# Patient Record
Sex: FEMALE | Race: WHITE | Employment: OTHER | URBAN - METROPOLITAN AREA
[De-identification: names, ages, dates, MRNs, and addresses within clinical notes are randomized per-mention and may not be internally consistent; named-entity substitution may affect disease eponyms.]

---

## 2017-11-14 ENCOUNTER — OFFICE VISIT (OUTPATIENT)
Dept: INTERNAL MEDICINE | Age: 47
End: 2017-11-14
Payer: COMMERCIAL

## 2017-11-14 VITALS
DIASTOLIC BLOOD PRESSURE: 80 MMHG | HEIGHT: 64 IN | SYSTOLIC BLOOD PRESSURE: 130 MMHG | BODY MASS INDEX: 25.1 KG/M2 | HEART RATE: 99 BPM | WEIGHT: 147 LBS

## 2017-11-14 DIAGNOSIS — Z23 NEED FOR DIPHTHERIA-TETANUS-PERTUSSIS (TDAP) VACCINE: ICD-10-CM

## 2017-11-14 DIAGNOSIS — F10.20 ALCOHOLISM (HCC): ICD-10-CM

## 2017-11-14 DIAGNOSIS — E78.2 MIXED HYPERLIPIDEMIA: ICD-10-CM

## 2017-11-14 DIAGNOSIS — K21.9 GASTROESOPHAGEAL REFLUX DISEASE WITHOUT ESOPHAGITIS: ICD-10-CM

## 2017-11-14 DIAGNOSIS — Z23 NEED FOR PROPHYLACTIC VACCINATION AGAINST STREPTOCOCCUS PNEUMONIAE (PNEUMOCOCCUS): ICD-10-CM

## 2017-11-14 DIAGNOSIS — F33.0 MILD EPISODE OF RECURRENT MAJOR DEPRESSIVE DISORDER (HCC): ICD-10-CM

## 2017-11-14 DIAGNOSIS — E11.9 TYPE 2 DIABETES MELLITUS WITHOUT COMPLICATION, WITHOUT LONG-TERM CURRENT USE OF INSULIN (HCC): Primary | ICD-10-CM

## 2017-11-14 DIAGNOSIS — Z23 NEEDS FLU SHOT: ICD-10-CM

## 2017-11-14 DIAGNOSIS — R03.0 PRE-HYPERTENSION: ICD-10-CM

## 2017-11-14 DIAGNOSIS — B18.2 CHRONIC HEPATITIS C WITHOUT HEPATIC COMA (HCC): ICD-10-CM

## 2017-11-14 LAB — HBA1C MFR BLD: 6.2 %

## 2017-11-14 PROCEDURE — 1036F TOBACCO NON-USER: CPT | Performed by: INTERNAL MEDICINE

## 2017-11-14 PROCEDURE — G8484 FLU IMMUNIZE NO ADMIN: HCPCS | Performed by: INTERNAL MEDICINE

## 2017-11-14 PROCEDURE — 99214 OFFICE O/P EST MOD 30 MIN: CPT | Performed by: INTERNAL MEDICINE

## 2017-11-14 PROCEDURE — 3044F HG A1C LEVEL LT 7.0%: CPT | Performed by: INTERNAL MEDICINE

## 2017-11-14 PROCEDURE — G8427 DOCREV CUR MEDS BY ELIG CLIN: HCPCS | Performed by: INTERNAL MEDICINE

## 2017-11-14 PROCEDURE — G8419 CALC BMI OUT NRM PARAM NOF/U: HCPCS | Performed by: INTERNAL MEDICINE

## 2017-11-14 PROCEDURE — 90715 TDAP VACCINE 7 YRS/> IM: CPT | Performed by: INTERNAL MEDICINE

## 2017-11-14 PROCEDURE — 90732 PPSV23 VACC 2 YRS+ SUBQ/IM: CPT | Performed by: INTERNAL MEDICINE

## 2017-11-14 PROCEDURE — 90471 IMMUNIZATION ADMIN: CPT | Performed by: INTERNAL MEDICINE

## 2017-11-14 PROCEDURE — 90472 IMMUNIZATION ADMIN EACH ADD: CPT | Performed by: INTERNAL MEDICINE

## 2017-11-14 PROCEDURE — 83036 HEMOGLOBIN GLYCOSYLATED A1C: CPT | Performed by: INTERNAL MEDICINE

## 2017-11-14 PROCEDURE — 90688 IIV4 VACCINE SPLT 0.5 ML IM: CPT | Performed by: INTERNAL MEDICINE

## 2017-11-14 RX ORDER — OMEPRAZOLE 20 MG/1
20 CAPSULE, DELAYED RELEASE ORAL DAILY
COMMUNITY
End: 2018-04-30 | Stop reason: SDUPTHER

## 2017-11-14 RX ORDER — DOCUSATE SODIUM 100 MG/1
100 CAPSULE, LIQUID FILLED ORAL 2 TIMES DAILY
COMMUNITY
End: 2022-06-07

## 2017-11-14 ASSESSMENT — PATIENT HEALTH QUESTIONNAIRE - PHQ9
3. TROUBLE FALLING OR STAYING ASLEEP: 3
9. THOUGHTS THAT YOU WOULD BE BETTER OFF DEAD, OR OF HURTING YOURSELF: 0
7. TROUBLE CONCENTRATING ON THINGS, SUCH AS READING THE NEWSPAPER OR WATCHING TELEVISION: 2
6. FEELING BAD ABOUT YOURSELF - OR THAT YOU ARE A FAILURE OR HAVE LET YOURSELF OR YOUR FAMILY DOWN: 0
SUM OF ALL RESPONSES TO PHQ9 QUESTIONS 1 & 2: 4
2. FEELING DOWN, DEPRESSED OR HOPELESS: 2
8. MOVING OR SPEAKING SO SLOWLY THAT OTHER PEOPLE COULD HAVE NOTICED. OR THE OPPOSITE, BEING SO FIGETY OR RESTLESS THAT YOU HAVE BEEN MOVING AROUND A LOT MORE THAN USUAL: 1
10. IF YOU CHECKED OFF ANY PROBLEMS, HOW DIFFICULT HAVE THESE PROBLEMS MADE IT FOR YOU TO DO YOUR WORK, TAKE CARE OF THINGS AT HOME, OR GET ALONG WITH OTHER PEOPLE: 1
5. POOR APPETITE OR OVEREATING: 1
1. LITTLE INTEREST OR PLEASURE IN DOING THINGS: 2
SUM OF ALL RESPONSES TO PHQ QUESTIONS 1-9: 12
4. FEELING TIRED OR HAVING LITTLE ENERGY: 1

## 2017-11-14 NOTE — PATIENT INSTRUCTIONS
LABORATORY INSTRUCTIONS    Your doctor has ordered blood or urine testing. You can get this testing done at the Lab located on the first floor of the [facility], or at any other [facility]. Please stop at Main Registration, before going to the lab, as you must be registered first.     Please get this lab done before your next visit. You may NOT eat, drink, smoke, or chew anything before this test for 8 hours. You may still have water. Return To Clinic 3/16/2018. After Visit Summary  given and reviewed. --MA    It is very important for your care that you keep your appointment. If for some reason you are unable to keep your appointment it is equally important that you call our office at [phone] to cancel your appointment and reschedule. Failure to do so may result in your termination from our practice. Additional lab orders were mailed to patient on 11/15/17 along with order for liver ultrasound.   ML on answering machine for patient to notify her and to expect a call from scheduling Paul CAMPOS

## 2017-11-14 NOTE — PROGRESS NOTES
[facility]    Progress Note    Date of patient's visit: 11/14/2017  YOB: 1970  Patient's Name:  Portillo Tsang    Patient Care Team:  Bhavin Dai MD as PCP - General (Internal Medicine)  Laurie Garcia MD as Consulting Physician (Gastroenterology)    REASON FOR VISIT: Routine outpatient follow     HISTORY OF PRESENT ILLNESS:    History was obtained from the patient. Portillo Tsang is a 49 y.o. is here for a  Follow up for chronic hep c. She is also complaining of depression that's worsening. She has bipolar disorder and is on Seroquel. Blood pressure initial elevated 142/90 but repeat was normal 130/80. Patient smoke before visit. Chronic hep c did not follow up with GI clinic. Actively drinking. Recent HCV RNA undetected but will re check now. 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)    Toothache       ALLERGIES      Allergies   Allergen Reactions    Benadryl [Diphenhydramine] Hives and Itching    Buspar [Buspirone]     Codeine     Flexeril [Cyclobenzaprine] Itching    Ibuprofen Hives    Lamotrigine     Paxil [Paroxetine Hcl]     Pcn [Penicillins]     Simvastatin     Zoloft [Sertraline Hcl]          MEDICATIONS:      Current Outpatient Prescriptions on File Prior to Visit   Medication Sig Dispense Refill    SEROQUEL  MG TB24 Take 150 mg by mouth daily       hydrOXYzine (VISTARIL) 25 MG capsule Take 25 mg by mouth nightly as needed for Anxiety      metroNIDAZOLE (FLAGYL) 500 MG tablet Take 1 tablet by mouth 2 times daily Take with Food. Do NOT drink alcohol.  14 tablet 0    niMODipine (NIMOTOP) 30 MG capsule Take 2 capsules by mouth every 6 hours for 7 days 56 capsule 0     No current facility-administered medications on file prior to visit. SOCIAL HISTORY    Reviewed and no change from previous record. Sonja  reports that she quit smoking about 2 years ago. Her smoking use included Cigarettes. She smoked 0.50 packs per day. She has never used smokeless tobacco.    FAMILY HISTORY:    Reviewed and No change from previous visit    REVIEW OF SYSTEMS:    ENT: negative  Respiratory: no cough, shortness of breath, or wheezing  Cardiovascular: no chest pain or dyspnea on exertion  Gastrointestinal: no abdominal pain, black or bloody stools  Neurological: no TIA or stroke symptoms  Endocrine: negative  Genito-Urinary: no dysuria, trouble voiding, or hematuria  Musculoskeletal: negative  Dermatological: negative    PHYSICAL EXAM:      Vitals:    11/14/17 1538   BP: (!) 142/97   Pulse: 99     Physical Exam   Constitutional: She appears well-nourished. HENT:   Head: Atraumatic. Eyes: Conjunctivae are normal.   Neck: Neck supple. Cardiovascular: Normal rate. Pulmonary/Chest: Effort normal.   Abdominal: Soft. Neurological: She is alert. Skin: Skin is warm. Psychiatric: She has a normal mood and affect.          LABORATORY FINDINGS:    CBC:  Lab Results   Component Value Date    WBC 8.4 11/01/2016    HGB 14.5 11/01/2016     11/01/2016     01/18/2012     BMP:    Lab Results   Component Value Date     11/01/2016    K 3.9 11/01/2016    CL 99 11/01/2016    CO2 23 11/01/2016    BUN 7 11/01/2016    CREATININE 0.48 11/01/2016    GLUCOSE 130 11/01/2016    GLUCOSE 123 01/18/2012     HEMOGLOBIN A1C:   Lab Results   Component Value Date    LABA1C 6.5 11/01/2016     MICROALBUMIN URINE: No results found for: MICROALBUR  FASTING LIPID PANEL:  Lab Results   Component Value Date    CHOL 315 (H) 11/01/2016    HDL 43 11/01/2016    TRIG 287 (H) 11/01/2016     LIVER PROFILE:  Lab Results   Component Value Date    ALT 28 11/01/2016    AST 18 11/01/2016    PROT 6.8 11/01/2016    BILITOT 0.46 11/01/2016    BILIDIR 0.10 11/01/2016    LABALBU 4.0 11/01/2016    LABALBU 4.5 01/18/2012      THYROID FUNCTION:   Lab Results   Component Value Date    TSH 0.90 11/01/2016      URINE ANALYSIS: No results found for: LABURIN  ASSESSMENT AND PLAN:    1. Chronic hepatitis C without hepatic coma (HCC)    - HIV 1/2 AB Multispot; Future  - CBC With Auto Differential; Future  - Basic Metabolic Panel; Future  - Hepatitis C RNA, Quantitative, PCR; Future  - Hepatitis C Antibody; Future  - Hepatitis C Genotyping; Future  - APTT; Future  - Protime-INR; Future  - Hepatic Function Panel; Future  - TSH With Reflex Ft4; Future  - AFP Tumor Marker; Future  - Hepatitis A Antibody, Total; Future  - US Liver; Future  - Hepatitis B Core Antibody, IgM; Future  - Hepatitis B Surface Antigen; Future    2. Mild episode of recurrent major depressive disorder ([facility] [address]  -pt has appointment with psych at [address] [place] [address] Ln  - Psychology referral    3. Pre hypertension  Discussed low salt and exrecise  Will recheck A1C    FOLLOW UP:   6 weeks    1. Sonja received counseling on the following healthy behaviors: nutrition, exercise, medication adherence and decrease in alcohol consumption  2. Patient given educational materials - see patient instructions  3. Discussed use, benefit, and side effects of prescribed medications. Barriers to medication compliance addressed. All patient questions answered. Pt voiced understanding. 4.  Reviewed prior labs and health maintenance  5. Continue current medications, diet and exercise.          Jorge Alberto Witt MD, PGY-2 Internal Medicine Resident  [facility]  11/14/2017  4:17 PM

## 2017-11-22 ENCOUNTER — HOSPITAL ENCOUNTER (OUTPATIENT)
Dept: ULTRASOUND IMAGING | Age: 47
Discharge: HOME | End: 2017-11-22
Payer: COMMERCIAL

## 2017-11-22 DIAGNOSIS — B18.2 CHRONIC HEPATITIS C WITHOUT HEPATIC COMA (HCC): ICD-10-CM

## 2017-11-22 PROCEDURE — 76705 ECHO EXAM OF ABDOMEN: CPT

## 2018-02-16 ENCOUNTER — HOSPITAL ENCOUNTER (OUTPATIENT)
Age: 48
Discharge: HOME | End: 2018-02-16
Payer: COMMERCIAL

## 2018-02-16 DIAGNOSIS — B18.2 CHRONIC HEPATITIS C WITHOUT HEPATIC COMA (HCC): ICD-10-CM

## 2018-02-16 DIAGNOSIS — E78.2 MIXED HYPERLIPIDEMIA: ICD-10-CM

## 2018-02-16 LAB
ABSOLUTE EOS #: 0.39 K/UL (ref 0–0.44)
ABSOLUTE IMMATURE GRANULOCYTE: <0.03 K/UL (ref 0–0.3)
ABSOLUTE LYMPH #: 4.34 K/UL (ref 1.1–3.7)
ABSOLUTE MONO #: 0.48 K/UL (ref 0.1–1.2)
AFP: 2.6 UG/L
ALBUMIN SERPL-MCNC: 4.3 G/DL (ref 3.5–5.2)
ALBUMIN/GLOBULIN RATIO: 1.3 (ref 1–2.5)
ALP BLD-CCNC: 48 U/L (ref 35–104)
ALT SERPL-CCNC: 19 U/L (ref 5–33)
ANION GAP SERPL CALCULATED.3IONS-SCNC: 13 MMOL/L (ref 9–17)
AST SERPL-CCNC: 15 U/L
BASOPHILS # BLD: 1 % (ref 0–2)
BASOPHILS ABSOLUTE: 0.06 K/UL (ref 0–0.2)
BILIRUB SERPL-MCNC: 0.46 MG/DL (ref 0.3–1.2)
BILIRUBIN DIRECT: 0.1 MG/DL
BILIRUBIN, INDIRECT: 0.36 MG/DL (ref 0–1)
BUN BLDV-MCNC: 10 MG/DL (ref 6–20)
BUN/CREAT BLD: ABNORMAL (ref 9–20)
CALCIUM SERPL-MCNC: 9.8 MG/DL (ref 8.6–10.4)
CHLORIDE BLD-SCNC: 102 MMOL/L (ref 98–107)
CHOLESTEROL/HDL RATIO: 6.6
CHOLESTEROL: 338 MG/DL
CO2: 26 MMOL/L (ref 20–31)
CREAT SERPL-MCNC: 0.48 MG/DL (ref 0.5–0.9)
DIFFERENTIAL TYPE: ABNORMAL
EOSINOPHILS RELATIVE PERCENT: 5 % (ref 1–4)
GFR AFRICAN AMERICAN: >60 ML/MIN
GFR NON-AFRICAN AMERICAN: >60 ML/MIN
GFR SERPL CREATININE-BSD FRML MDRD: ABNORMAL ML/MIN/{1.73_M2}
GFR SERPL CREATININE-BSD FRML MDRD: ABNORMAL ML/MIN/{1.73_M2}
GLOBULIN: NORMAL G/DL (ref 1.5–3.8)
GLUCOSE BLD-MCNC: 118 MG/DL (ref 70–99)
HAV AB SERPL IA-ACNC: NONREACTIVE
HCT VFR BLD CALC: 46.3 % (ref 36.3–47.1)
HDLC SERPL-MCNC: 51 MG/DL
HEMOGLOBIN: 15 G/DL (ref 11.9–15.1)
HEPATITIS B SURFACE ANTIGEN: NONREACTIVE
HIV AG/AB: NONREACTIVE
IMMATURE GRANULOCYTES: 0 %
INR BLD: 0.9
LDL CHOLESTEROL: 243 MG/DL (ref 0–130)
LYMPHOCYTES # BLD: 52 % (ref 24–43)
MCH RBC QN AUTO: 30.7 PG (ref 25.2–33.5)
MCHC RBC AUTO-ENTMCNC: 32.4 G/DL (ref 28.4–34.8)
MCV RBC AUTO: 94.7 FL (ref 82.6–102.9)
MONOCYTES # BLD: 6 % (ref 3–12)
NRBC AUTOMATED: 0 PER 100 WBC
PARTIAL THROMBOPLASTIN TIME: 27.2 SEC (ref 20.5–30.5)
PDW BLD-RTO: 12.4 % (ref 11.8–14.4)
PLATELET # BLD: ABNORMAL K/UL (ref 138–453)
PLATELET ESTIMATE: ABNORMAL
PLATELET, FLUORESCENCE: 238 K/UL (ref 138–453)
PLATELET, IMMATURE FRACTION: 3 % (ref 1.1–10.3)
PMV BLD AUTO: ABNORMAL FL (ref 8.1–13.5)
POTASSIUM SERPL-SCNC: 4 MMOL/L (ref 3.7–5.3)
PROTHROMBIN TIME: 10.2 SEC (ref 9–12)
RBC # BLD: 4.89 M/UL (ref 3.95–5.11)
RBC # BLD: ABNORMAL 10*6/UL
SEG NEUTROPHILS: 36 % (ref 36–65)
SEGMENTED NEUTROPHILS ABSOLUTE COUNT: 3.03 K/UL (ref 1.5–8.1)
SODIUM BLD-SCNC: 141 MMOL/L (ref 135–144)
TOTAL PROTEIN: 7.5 G/DL (ref 6.4–8.3)
TRIGL SERPL-MCNC: 220 MG/DL
TSH SERPL DL<=0.05 MIU/L-ACNC: 3.69 MIU/L (ref 0.3–5)
VLDLC SERPL CALC-MCNC: ABNORMAL MG/DL (ref 1–30)
WBC # BLD: 8.3 K/UL (ref 3.5–11.3)
WBC # BLD: ABNORMAL 10*3/UL

## 2018-02-16 PROCEDURE — 36415 COLL VENOUS BLD VENIPUNCTURE: CPT

## 2018-02-16 PROCEDURE — 87522 HEPATITIS C REVRS TRNSCRPJ: CPT

## 2018-02-16 PROCEDURE — 85730 THROMBOPLASTIN TIME PARTIAL: CPT

## 2018-02-16 PROCEDURE — 80076 HEPATIC FUNCTION PANEL: CPT

## 2018-02-16 PROCEDURE — 87902 NFCT AGT GNTYP ALYS HEP C: CPT

## 2018-02-16 PROCEDURE — 85055 RETICULATED PLATELET ASSAY: CPT

## 2018-02-16 PROCEDURE — 85025 COMPLETE CBC W/AUTO DIFF WBC: CPT

## 2018-02-16 PROCEDURE — 80061 LIPID PANEL: CPT

## 2018-02-16 PROCEDURE — 85610 PROTHROMBIN TIME: CPT

## 2018-02-16 PROCEDURE — 84443 ASSAY THYROID STIM HORMONE: CPT

## 2018-02-16 PROCEDURE — 82105 ALPHA-FETOPROTEIN SERUM: CPT

## 2018-02-16 PROCEDURE — 87340 HEPATITIS B SURFACE AG IA: CPT

## 2018-02-16 PROCEDURE — 80048 BASIC METABOLIC PNL TOTAL CA: CPT

## 2018-02-16 PROCEDURE — 87389 HIV-1 AG W/HIV-1&-2 AB AG IA: CPT

## 2018-02-16 PROCEDURE — 86708 HEPATITIS A ANTIBODY: CPT

## 2018-02-20 LAB
DIRECT EXAM: NORMAL
HCV QUANTITATIVE: NORMAL
HEPATITIS C GENOTYPE: NORMAL
Lab: NORMAL
SPECIMEN DESCRIPTION: NORMAL
STATUS: NORMAL

## 2018-03-19 ENCOUNTER — OFFICE VISIT (OUTPATIENT)
Dept: INTERNAL MEDICINE | Age: 48
End: 2018-03-19
Payer: COMMERCIAL

## 2018-03-19 VITALS
HEART RATE: 78 BPM | SYSTOLIC BLOOD PRESSURE: 142 MMHG | BODY MASS INDEX: 25.27 KG/M2 | HEIGHT: 64 IN | DIASTOLIC BLOOD PRESSURE: 76 MMHG | WEIGHT: 148 LBS

## 2018-03-19 DIAGNOSIS — I10 ESSENTIAL HYPERTENSION: Primary | ICD-10-CM

## 2018-03-19 DIAGNOSIS — E78.00 PURE HYPERCHOLESTEROLEMIA: ICD-10-CM

## 2018-03-19 DIAGNOSIS — R73.03 PRE-DIABETES: ICD-10-CM

## 2018-03-19 PROCEDURE — 99213 OFFICE O/P EST LOW 20 MIN: CPT | Performed by: STUDENT IN AN ORGANIZED HEALTH CARE EDUCATION/TRAINING PROGRAM

## 2018-03-19 PROCEDURE — 1036F TOBACCO NON-USER: CPT | Performed by: STUDENT IN AN ORGANIZED HEALTH CARE EDUCATION/TRAINING PROGRAM

## 2018-03-19 PROCEDURE — G8419 CALC BMI OUT NRM PARAM NOF/U: HCPCS | Performed by: STUDENT IN AN ORGANIZED HEALTH CARE EDUCATION/TRAINING PROGRAM

## 2018-03-19 PROCEDURE — G8427 DOCREV CUR MEDS BY ELIG CLIN: HCPCS | Performed by: STUDENT IN AN ORGANIZED HEALTH CARE EDUCATION/TRAINING PROGRAM

## 2018-03-19 PROCEDURE — 99213 OFFICE O/P EST LOW 20 MIN: CPT

## 2018-03-19 PROCEDURE — G8482 FLU IMMUNIZE ORDER/ADMIN: HCPCS | Performed by: STUDENT IN AN ORGANIZED HEALTH CARE EDUCATION/TRAINING PROGRAM

## 2018-03-19 RX ORDER — ATORVASTATIN CALCIUM 40 MG/1
40 TABLET, FILM COATED ORAL DAILY
Qty: 30 TABLET | Refills: 3 | Status: SHIPPED | OUTPATIENT
Start: 2018-03-19 | End: 2018-04-30 | Stop reason: SDUPTHER

## 2018-03-19 RX ORDER — DULOXETIN HYDROCHLORIDE 60 MG/1
60 CAPSULE, DELAYED RELEASE ORAL 2 TIMES DAILY
COMMUNITY
Start: 2018-01-18

## 2018-03-19 RX ORDER — QUETIAPINE 200 MG/1
200 TABLET, FILM COATED, EXTENDED RELEASE ORAL DAILY
COMMUNITY
Start: 2018-01-18

## 2018-03-19 RX ORDER — LISINOPRIL 5 MG/1
5 TABLET ORAL DAILY
Qty: 30 TABLET | Refills: 3 | Status: SHIPPED | OUTPATIENT
Start: 2018-03-19 | End: 2018-04-30 | Stop reason: SDUPTHER

## 2018-03-19 NOTE — PROGRESS NOTES
cranial nerves, motor system noted    LABORATORY FINDINGS:    CBC:  Lab Results   Component Value Date    WBC 8.3 02/16/2018    HGB 15.0 02/16/2018    PLT See Reflexed IPF Result 02/16/2018     01/18/2012       BMP:    Lab Results   Component Value Date     02/16/2018    K 4.0 02/16/2018     02/16/2018    CO2 26 02/16/2018    BUN 10 02/16/2018    CREATININE 0.48 02/16/2018    GLUCOSE 118 02/16/2018    GLUCOSE 123 01/18/2012       HEMOGLOBIN A1C:   Lab Results   Component Value Date    LABA1C 6.2 11/14/2017       FASTING LIPID PANEL:  Lab Results   Component Value Date    CHOL 338 (H) 02/16/2018    HDL 51 02/16/2018    TRIG 220 (H) 02/16/2018       ASSESSMENT AND PLAN:      1. Essential hypertension  Basic Metabolic Panel    lisinopril (PRINIVIL;ZESTRIL) 5 MG tablet   2. Pre-diabetes     3. Pure hypercholesterolemia  atorvastatin (LIPITOR) 40 MG tablet       FOLLOW UP:   1. F/u in 4 weeks    INSTRUCTIONS:   1. Sonja received counseling on the following healthy behaviors: nutrition, exercise, medication adherence and tobacco cessation    2. Discussed use, benefit, and side effects of prescribed medications. Barriers to medication compliance addressed. All patient questions answered. Pt voiced understanding.      3. Patient given educational materials - see patient instructions    SIXTO Sarmiento  3/19/2018, 3:40 PM

## 2018-04-30 ENCOUNTER — OFFICE VISIT (OUTPATIENT)
Dept: INTERNAL MEDICINE | Age: 48
End: 2018-04-30
Payer: COMMERCIAL

## 2018-04-30 VITALS
DIASTOLIC BLOOD PRESSURE: 81 MMHG | SYSTOLIC BLOOD PRESSURE: 136 MMHG | WEIGHT: 140 LBS | HEART RATE: 80 BPM | HEIGHT: 64 IN | BODY MASS INDEX: 23.9 KG/M2

## 2018-04-30 DIAGNOSIS — E78.00 PURE HYPERCHOLESTEROLEMIA: ICD-10-CM

## 2018-04-30 DIAGNOSIS — E11.9 TYPE 2 DIABETES MELLITUS WITHOUT COMPLICATION, WITHOUT LONG-TERM CURRENT USE OF INSULIN (HCC): Primary | ICD-10-CM

## 2018-04-30 DIAGNOSIS — I10 ESSENTIAL HYPERTENSION: ICD-10-CM

## 2018-04-30 DIAGNOSIS — E55.9 VITAMIN D DEFICIENCY: ICD-10-CM

## 2018-04-30 DIAGNOSIS — K21.9 GASTROESOPHAGEAL REFLUX DISEASE WITHOUT ESOPHAGITIS: ICD-10-CM

## 2018-04-30 PROCEDURE — 3046F HEMOGLOBIN A1C LEVEL >9.0%: CPT | Performed by: STUDENT IN AN ORGANIZED HEALTH CARE EDUCATION/TRAINING PROGRAM

## 2018-04-30 PROCEDURE — 99213 OFFICE O/P EST LOW 20 MIN: CPT | Performed by: STUDENT IN AN ORGANIZED HEALTH CARE EDUCATION/TRAINING PROGRAM

## 2018-04-30 PROCEDURE — 4004F PT TOBACCO SCREEN RCVD TLK: CPT | Performed by: STUDENT IN AN ORGANIZED HEALTH CARE EDUCATION/TRAINING PROGRAM

## 2018-04-30 PROCEDURE — G8420 CALC BMI NORM PARAMETERS: HCPCS | Performed by: STUDENT IN AN ORGANIZED HEALTH CARE EDUCATION/TRAINING PROGRAM

## 2018-04-30 PROCEDURE — G8427 DOCREV CUR MEDS BY ELIG CLIN: HCPCS | Performed by: STUDENT IN AN ORGANIZED HEALTH CARE EDUCATION/TRAINING PROGRAM

## 2018-04-30 PROCEDURE — 99213 OFFICE O/P EST LOW 20 MIN: CPT

## 2018-04-30 PROCEDURE — 2022F DILAT RTA XM EVC RTNOPTHY: CPT | Performed by: STUDENT IN AN ORGANIZED HEALTH CARE EDUCATION/TRAINING PROGRAM

## 2018-04-30 RX ORDER — OMEPRAZOLE 20 MG/1
20 CAPSULE, DELAYED RELEASE ORAL DAILY
Qty: 30 CAPSULE | Refills: 5 | Status: SHIPPED | OUTPATIENT
Start: 2018-04-30 | End: 2020-01-27 | Stop reason: SDUPTHER

## 2018-04-30 RX ORDER — ATORVASTATIN CALCIUM 40 MG/1
40 TABLET, FILM COATED ORAL DAILY
Qty: 30 TABLET | Refills: 5 | Status: SHIPPED | OUTPATIENT
Start: 2018-04-30 | End: 2020-01-27 | Stop reason: SDUPTHER

## 2018-04-30 RX ORDER — LISINOPRIL 5 MG/1
5 TABLET ORAL DAILY
Qty: 30 TABLET | Refills: 5 | Status: SHIPPED | OUTPATIENT
Start: 2018-04-30 | End: 2019-03-04 | Stop reason: SDUPTHER

## 2018-06-01 ENCOUNTER — HOSPITAL ENCOUNTER (OUTPATIENT)
Age: 48
Setting detail: SPECIMEN
Discharge: HOME | End: 2018-06-01
Payer: COMMERCIAL

## 2018-06-01 ENCOUNTER — OFFICE VISIT (OUTPATIENT)
Dept: INTERNAL MEDICINE | Age: 48
End: 2018-06-01
Payer: COMMERCIAL

## 2018-06-01 VITALS
SYSTOLIC BLOOD PRESSURE: 134 MMHG | HEART RATE: 88 BPM | DIASTOLIC BLOOD PRESSURE: 81 MMHG | BODY MASS INDEX: 23.65 KG/M2 | WEIGHT: 137.8 LBS

## 2018-06-01 DIAGNOSIS — F17.200 SMOKER: Primary | ICD-10-CM

## 2018-06-01 DIAGNOSIS — E55.9 VITAMIN D DEFICIENCY: ICD-10-CM

## 2018-06-01 DIAGNOSIS — R73.03 PREDIABETES: ICD-10-CM

## 2018-06-01 DIAGNOSIS — E78.5 HYPERLIPIDEMIA, UNSPECIFIED HYPERLIPIDEMIA TYPE: ICD-10-CM

## 2018-06-01 DIAGNOSIS — I10 ESSENTIAL HYPERTENSION: ICD-10-CM

## 2018-06-01 DIAGNOSIS — E78.00 PURE HYPERCHOLESTEROLEMIA: ICD-10-CM

## 2018-06-01 DIAGNOSIS — Z12.39 BREAST CANCER SCREENING: ICD-10-CM

## 2018-06-01 LAB
ANION GAP SERPL CALCULATED.3IONS-SCNC: 13 MMOL/L (ref 9–17)
BUN BLDV-MCNC: 8 MG/DL (ref 6–20)
BUN/CREAT BLD: ABNORMAL (ref 9–20)
CALCIUM SERPL-MCNC: 9.5 MG/DL (ref 8.6–10.4)
CHLORIDE BLD-SCNC: 98 MMOL/L (ref 98–107)
CHOLESTEROL/HDL RATIO: 4.4
CHOLESTEROL: 247 MG/DL
CO2: 26 MMOL/L (ref 20–31)
CREAT SERPL-MCNC: 0.42 MG/DL (ref 0.5–0.9)
CREATININE URINE: 127.2 MG/DL (ref 28–217)
GFR AFRICAN AMERICAN: >60 ML/MIN
GFR NON-AFRICAN AMERICAN: >60 ML/MIN
GFR SERPL CREATININE-BSD FRML MDRD: ABNORMAL ML/MIN/{1.73_M2}
GFR SERPL CREATININE-BSD FRML MDRD: ABNORMAL ML/MIN/{1.73_M2}
GLUCOSE BLD-MCNC: 112 MG/DL (ref 70–99)
HBA1C MFR BLD: 6.2 %
HDLC SERPL-MCNC: 56 MG/DL
LDL CHOLESTEROL: 141 MG/DL (ref 0–130)
MICROALBUMIN/CREAT 24H UR: <12 MG/L
MICROALBUMIN/CREAT UR-RTO: NORMAL MCG/MG CREAT
POTASSIUM SERPL-SCNC: 4.5 MMOL/L (ref 3.7–5.3)
SODIUM BLD-SCNC: 137 MMOL/L (ref 135–144)
TRIGL SERPL-MCNC: 252 MG/DL
VITAMIN D 25-HYDROXY: 17.7 NG/ML (ref 30–100)
VLDLC SERPL CALC-MCNC: ABNORMAL MG/DL (ref 1–30)

## 2018-06-01 PROCEDURE — 83036 HEMOGLOBIN GLYCOSYLATED A1C: CPT | Performed by: STUDENT IN AN ORGANIZED HEALTH CARE EDUCATION/TRAINING PROGRAM

## 2018-06-01 PROCEDURE — 82306 VITAMIN D 25 HYDROXY: CPT

## 2018-06-01 PROCEDURE — 80061 LIPID PANEL: CPT

## 2018-06-01 PROCEDURE — 99213 OFFICE O/P EST LOW 20 MIN: CPT | Performed by: INTERNAL MEDICINE

## 2018-06-01 PROCEDURE — 99213 OFFICE O/P EST LOW 20 MIN: CPT | Performed by: STUDENT IN AN ORGANIZED HEALTH CARE EDUCATION/TRAINING PROGRAM

## 2018-06-01 PROCEDURE — 80048 BASIC METABOLIC PNL TOTAL CA: CPT

## 2018-06-01 PROCEDURE — 82570 ASSAY OF URINE CREATININE: CPT

## 2018-06-01 PROCEDURE — 36415 COLL VENOUS BLD VENIPUNCTURE: CPT

## 2018-06-01 PROCEDURE — 82043 UR ALBUMIN QUANTITATIVE: CPT

## 2018-06-01 PROCEDURE — 4004F PT TOBACCO SCREEN RCVD TLK: CPT | Performed by: STUDENT IN AN ORGANIZED HEALTH CARE EDUCATION/TRAINING PROGRAM

## 2018-06-01 PROCEDURE — G8427 DOCREV CUR MEDS BY ELIG CLIN: HCPCS | Performed by: STUDENT IN AN ORGANIZED HEALTH CARE EDUCATION/TRAINING PROGRAM

## 2018-06-01 PROCEDURE — G8420 CALC BMI NORM PARAMETERS: HCPCS | Performed by: STUDENT IN AN ORGANIZED HEALTH CARE EDUCATION/TRAINING PROGRAM

## 2018-06-01 RX ORDER — ALBUTEROL SULFATE 90 UG/1
2 AEROSOL, METERED RESPIRATORY (INHALATION) EVERY 6 HOURS PRN
Qty: 1 INHALER | Refills: 3 | Status: SHIPPED | OUTPATIENT
Start: 2018-06-01 | End: 2018-08-14 | Stop reason: SDUPTHER

## 2018-07-03 DIAGNOSIS — E55.9 VITAMIN D DEFICIENCY: Primary | ICD-10-CM

## 2018-07-03 RX ORDER — ERGOCALCIFEROL 1.25 MG/1
50000 CAPSULE ORAL WEEKLY
Qty: 8 CAPSULE | Refills: 0 | Status: SHIPPED | OUTPATIENT
Start: 2018-07-03 | End: 2018-10-29 | Stop reason: SDUPTHER

## 2018-07-10 ENCOUNTER — HOSPITAL ENCOUNTER (EMERGENCY)
Age: 48
Discharge: HOME | End: 2018-07-10
Attending: EMERGENCY MEDICINE
Payer: COMMERCIAL

## 2018-07-10 ENCOUNTER — APPOINTMENT (OUTPATIENT)
Dept: GENERAL RADIOLOGY | Age: 48
End: 2018-07-10
Payer: COMMERCIAL

## 2018-07-10 VITALS
WEIGHT: 145 LBS | HEART RATE: 87 BPM | RESPIRATION RATE: 15 BRPM | OXYGEN SATURATION: 99 % | BODY MASS INDEX: 24.89 KG/M2 | TEMPERATURE: 97.7 F | DIASTOLIC BLOOD PRESSURE: 62 MMHG | SYSTOLIC BLOOD PRESSURE: 112 MMHG

## 2018-07-10 DIAGNOSIS — S32.2XXA FRACTURE OF COCCYX, INITIAL ENCOUNTER FOR CLOSED FRACTURE (HCC): Primary | ICD-10-CM

## 2018-07-10 PROCEDURE — 6370000000 HC RX 637 (ALT 250 FOR IP): Performed by: PHYSICIAN ASSISTANT

## 2018-07-10 PROCEDURE — 99283 EMERGENCY DEPT VISIT LOW MDM: CPT

## 2018-07-10 PROCEDURE — 72100 X-RAY EXAM L-S SPINE 2/3 VWS: CPT

## 2018-07-10 PROCEDURE — 72170 X-RAY EXAM OF PELVIS: CPT

## 2018-07-10 PROCEDURE — 72220 X-RAY EXAM SACRUM TAILBONE: CPT

## 2018-07-10 RX ORDER — HYDROCODONE BITARTRATE AND ACETAMINOPHEN 5; 325 MG/1; MG/1
1 TABLET ORAL ONCE
Status: COMPLETED | OUTPATIENT
Start: 2018-07-10 | End: 2018-07-10

## 2018-07-10 RX ORDER — OXYCODONE HYDROCHLORIDE AND ACETAMINOPHEN 5; 325 MG/1; MG/1
1 TABLET ORAL EVERY 6 HOURS PRN
Qty: 12 TABLET | Refills: 0 | Status: SHIPPED | OUTPATIENT
Start: 2018-07-10 | End: 2018-07-13

## 2018-07-10 RX ADMIN — HYDROCODONE BITARTRATE AND ACETAMINOPHEN 1 TABLET: 5; 325 TABLET ORAL at 19:55

## 2018-07-10 ASSESSMENT — ENCOUNTER SYMPTOMS
BACK PAIN: 1
NAUSEA: 0
VOMITING: 0
SHORTNESS OF BREATH: 0
ABDOMINAL PAIN: 0
DIARRHEA: 0
COLOR CHANGE: 0
COUGH: 0

## 2018-07-10 ASSESSMENT — PAIN SCALES - GENERAL
PAINLEVEL_OUTOF10: 10
PAINLEVEL_OUTOF10: 10

## 2018-07-10 ASSESSMENT — PAIN DESCRIPTION - LOCATION: LOCATION: BACK

## 2018-07-10 ASSESSMENT — PAIN DESCRIPTION - PAIN TYPE: TYPE: ACUTE PAIN

## 2018-07-10 NOTE — ED PROVIDER NOTES
[facility]  ED  eMERGENCY dEPARTMENT eNCOUnter      Pt Name: Narendra Coates  MRN: [id number]  [place] 1970  Date of evaluation: 7/10/2018  Provider: Claudia Gabriel Dr       Chief Complaint   Patient presents with    Back Pain     tailbone area after falling severral days ago             HISTORY OF PRESENT ILLNESS  (Location/Symptom, Timing/Onset, Context/Setting, Quality, Duration, Modifying Factors, Severity.)   Narendra Coates is a 51 y.o. female who presents to the emergency department Complaining of sacrum and coccyx pain along with lumbar spine pain status post fall 3 days ago at her daughter's wedding. She states she was drunk on tequila and fell on her butt. She denies any radicular pain and denies any other symptoms at this time. No nausea or vomiting. She is ambulatory. She did not hit her head or lose consciousness. REVIEW OF SYSTEMS    (2-9 systems for level 4, 10 or more for level 5)     Review of Systems   Constitutional: Negative for chills, fatigue and fever. Respiratory: Negative for cough and shortness of breath. Cardiovascular: Negative for chest pain, palpitations and leg swelling. Gastrointestinal: Negative for abdominal pain, diarrhea, nausea and vomiting. Musculoskeletal: Positive for back pain. Negative for neck pain and neck stiffness. Tailbone pain. Skin: Negative for color change. Neurological: Negative for dizziness, facial asymmetry, weakness, light-headedness, numbness and headaches.          PAST MEDICAL HISTORY         Diagnosis Date    Anxiety     B[facility] disorder ([facility] [address]     Brain aneurysm 9/2015    Depression     Epilepsy [facility])     NOT ON ANY MEDICATION    Essential hypertension 6/1/2018    Headache     Hyperlipidemia     Liver disease     hep c    Manic depression (HCC)     Pancreatitis     Scoliosis     Seasonal allergies        SURGICAL HISTORY           Procedure Laterality Date    BRAIN  Mother Alive    Father     Sister (Not Specified)        SOCIAL HISTORY      reports that she has been smoking Cigarettes. She has been smoking about 0.50 packs per day. She has never used smokeless tobacco. She reports that she drinks about 1.8 oz of alcohol per week . She reports that she does not use drugs. PHYSICAL EXAM    (up to 7 for level 4, 8 or more for level 5)     Vitals:    07/10/18 194   BP: 112/62   Pulse: 87   Resp: 15   Temp: 97.7 °F (36.5 °C)   TempSrc: Oral   SpO2: 99%   Weight: 145 lb (65.8 kg)       Physical Exam   Constitutional: She is oriented to person, place, and time. She appears well-developed and well-nourished. No distress. HENT:   Head: Normocephalic and atraumatic. Eyes: Conjunctivae are normal. Pupils are equal, round, and reactive to light. Neck: Normal range of motion. Neck supple. Cardiovascular: Normal rate, regular rhythm, normal heart sounds and intact distal pulses. Exam reveals no gallop and no friction rub. No murmur heard. Pulmonary/Chest: Effort normal and breath sounds normal. No respiratory distress. She has no wheezes. She has no rales. Abdominal: Soft. Bowel sounds are normal. She exhibits no distension and no mass. There is no tenderness. There is no rebound and no guarding. Musculoskeletal: Normal range of motion. Back:    No saddle anesthesia. No bowel or bladder dysfunction. No IV drug use. Low suspicion for cauda equina syndrome or epidural abscess. Motor function is 5 out of 5 bilaterally in all extremities. Cap refills less than 2 seconds in all extremities. Light sensation is intact. Proprioception is within normal limits. Distal pulses and deep tendon reflexes are within normal limits. Negative straight leg examination. No calf pain. All compartments are soft and compressible. No septic joints noted. Neurological: She is alert and oriented to person, place, and time. She has normal reflexes. No cranial nerve deficit. and patient agree no further workup is necessary at this time. Patient was given very strict return protocols and is completely agreeable with this plan. This patient was seen by the attending physician and they agreed with the assessment and plan. CONSULTS:  None    PROCEDURES:  Procedures    FINAL IMPRESSION      1. Fracture of coccyx, initial encounter for closed fracture [facility])          DISPOSITION/PLAN   DISPOSITION Decision To Discharge 07/10/2018 09:09:13 PM      PATIENT REFERRED TO:  Rachel Allen MD  [address] [facility] [address] [facility] [address] [phone]    Go in 3 days  For [facility]  [address] [facility] [address] [phone]  Go to   As needed, If symptoms worsen    [address] [facility]  [address]  [phone]  Go in 3 days  For Re-check      DISCHARGE MEDICATIONS:  Discharge Medication List as of 7/10/2018  9:11 PM      START taking these medications    Details   oxyCODONE-acetaminophen (PERCOCET) 5-325 MG per tablet Take 1 tablet by mouth every 6 hours as needed for Pain for up to 3 days. Intended supply: 3 days.  Take lowest dose possible to manage pain., Disp-12 tablet, R-0Print             (Please note that portions of this note were completed with a voice recognition program.  Efforts were made to edit the dictations but occasionally words are mis-transcribed.)    THOMAS Alanis PA-C  07/10/18 [id number]

## 2018-07-18 DIAGNOSIS — E11.9 TYPE 2 DIABETES MELLITUS WITHOUT COMPLICATION, WITHOUT LONG-TERM CURRENT USE OF INSULIN (HCC): ICD-10-CM

## 2018-07-18 NOTE — TELEPHONE ENCOUNTER
E-scribe request for Metformin last appt 6/1/2018. Please review and e-scribe if applicable. Next Visit Date:  Future Appointments  Date Time Provider Jyoti Yee   7/31/2018 1:45 PM STV MAMMO RM 1 [facility] MAMMO [facility]   7/31/2018 3:00 PM STV PFT RM 1 [facility]VZ PFT [facility]   9/11/2018 11:00 AM Brenda Ulrich MD [id number]       Hemoglobin A1C (%)   Date Value   06/01/2018 6.2   11/14/2017 6.2   11/01/2016 6.5 (H)             ( goal A1C is < 7)   Microalb/Crt.  Ratio (mcg/mg creat)   Date Value   06/01/2018 CANNOT BE CALCULATED     LDL Cholesterol (mg/dL)   Date Value   06/01/2018 141 (H)     LDL Calculated (mg/dL)   Date Value   04/04/2014 253 (A)       (goal LDL is <100)   AST (U/L)   Date Value   02/16/2018 15     ALT (U/L)   Date Value   02/16/2018 19     BUN (mg/dL)   Date Value   06/01/2018 8     BP Readings from Last 3 Encounters:   07/10/18 112/62   06/01/18 134/81   04/30/18 136/81          (goal 120/80)        Patient Active Problem List:     Bipolar disorder ([facility] [address]     Scoliosis     Depression     Hyperlipemia     Essential hypertension     Prediabetes

## 2018-07-30 DIAGNOSIS — Z12.39 SCREENING FOR BREAST CANCER: Primary | ICD-10-CM

## 2018-08-14 DIAGNOSIS — F17.200 SMOKER: ICD-10-CM

## 2018-08-15 NOTE — TELEPHONE ENCOUNTER
Ventolin pending for refill         Health Maintenance   Topic Date Due    Diabetic retinal exam  03/18/1979    Cervical cancer screen  03/18/1990    Flu vaccine (1) 09/01/2018    Diabetic foot exam  06/01/2019    A1C test (Diabetic or Prediabetic)  06/01/2019    Diabetic microalbuminuria test  06/01/2019    Lipid screen  06/01/2019    Potassium monitoring  06/01/2019    Creatinine monitoring  06/01/2019    DTaP/Tdap/Td vaccine (2 - Td) 11/14/2027    Pneumococcal med risk  Completed    HIV screen  Completed             (applicable per patient's age: Cancer Screenings, Depression Screening, Fall Risk Screening, Immunizations)    Hemoglobin A1C (%)   Date Value   06/01/2018 6.2   11/14/2017 6.2   11/01/2016 6.5 (H)     Microalb/Crt.  Ratio (mcg/mg creat)   Date Value   06/01/2018 CANNOT BE CALCULATED     LDL Cholesterol (mg/dL)   Date Value   06/01/2018 141 (H)     LDL Calculated (mg/dL)   Date Value   04/04/2014 253 (A)     AST (U/L)   Date Value   02/16/2018 15     ALT (U/L)   Date Value   02/16/2018 19     BUN (mg/dL)   Date Value   06/01/2018 8      (goal A1C is < 7)   (goal LDL is <100) need 30-50% reduction from baseline     BP Readings from Last 3 Encounters:   07/10/18 112/62   06/01/18 134/81   04/30/18 136/81    (goal /80)      All Future Testing planned in [facility]:  Lab Frequency Next Occurrence   DEBBY DIGITAL SCREENING AUGMENTED BILAT Once 09/24/2018   Spirometry with bronchodilator Once 06/15/2018   DEBBY DIGITAL SCREEN W CAD BILATERAL Once 11/07/2018       Next Visit Date:  Future Appointments  Date Time Provider Jyoti Yee   9/11/2018 11:00 AM Robin Tanner MD [facility] IM [address]            Patient Active Problem List:     Bipolar disorder ([facility] [address]     Scoliosis     Depression     Hyperlipemia     Essential hypertension     Prediabetes

## 2018-08-21 ENCOUNTER — TELEPHONE (OUTPATIENT)
Dept: INTERNAL MEDICINE | Age: 48
End: 2018-08-21

## 2018-08-21 DIAGNOSIS — J45.909 UNCOMPLICATED ASTHMA, UNSPECIFIED ASTHMA SEVERITY, UNSPECIFIED WHETHER PERSISTENT: Primary | ICD-10-CM

## 2018-08-23 RX ORDER — ALBUTEROL SULFATE 90 UG/1
2 AEROSOL, METERED RESPIRATORY (INHALATION) EVERY 6 HOURS PRN
Qty: 1 INHALER | Refills: 3 | Status: SHIPPED | OUTPATIENT
Start: 2018-08-23 | End: 2019-07-11 | Stop reason: SDUPTHER

## 2018-09-11 DIAGNOSIS — F17.200 SMOKER: ICD-10-CM

## 2018-09-11 NOTE — TELEPHONE ENCOUNTER
ruperto request for VENTOLIN HFA INH W/DOS CTR 200PUFFS future appointment scheduled. Health Maintenance   Topic Date Due    Diabetic retinal exam  03/18/1979    Cervical cancer screen  03/18/1990    Flu vaccine (1) 09/01/2018    Diabetic foot exam  06/01/2019    A1C test (Diabetic or Prediabetic)  06/01/2019    Diabetic microalbuminuria test  06/01/2019    Lipid screen  06/01/2019    Potassium monitoring  06/01/2019    Creatinine monitoring  06/01/2019    DTaP/Tdap/Td vaccine (2 - Td) 11/14/2027    Pneumococcal med risk  Completed    HIV screen  Completed             (applicable per patient's age: Cancer Screenings, Depression Screening, Fall Risk Screening, Immunizations)    Hemoglobin A1C (%)   Date Value   06/01/2018 6.2   11/14/2017 6.2   11/01/2016 6.5 (H)     Microalb/Crt. Ratio (mcg/mg creat)   Date Value   06/01/2018 CANNOT BE CALCULATED     LDL Cholesterol (mg/dL)   Date Value   06/01/2018 141 (H)     LDL Calculated (mg/dL)   Date Value   04/04/2014 253 (A)     AST (U/L)   Date Value   02/16/2018 15     ALT (U/L)   Date Value   02/16/2018 19     BUN (mg/dL)   Date Value   06/01/2018 8      (goal A1C is < 7)   (goal LDL is <100) need 30-50% reduction from baseline     BP Readings from Last 3 Encounters:   07/10/18 112/62   06/01/18 134/81   04/30/18 136/81    (goal /80)      All Future Testing planned in [facility]:  Lab Frequency Next Occurrence   DEBBY DIGITAL SCREENING AUGMENTED BILAT Once 09/24/2018   Spirometry with bronchodilator Once 06/15/2018   DEBBY DIGITAL SCREEN W CAD BILATERAL Once 11/07/2018       Next Visit Date:  No future appointments.          Patient Active Problem List:     Bipolar disorder ([address]     Scoliosis     Depression     Hyperlipemia     Essential hypertension     Prediabetes

## 2018-10-11 ENCOUNTER — TELEPHONE (OUTPATIENT)
Dept: INTERNAL MEDICINE | Age: 48
End: 2018-10-11

## 2018-10-24 DIAGNOSIS — F17.200 SMOKER: ICD-10-CM

## 2018-10-24 NOTE — TELEPHONE ENCOUNTER
E-scribe request for VENTOLIN HFA INH W/DOS CTR [id number]. Please review and e-scribe if applicable. Last Visit Date:  6/1/18  Next Visit Date:  10/23/18    Hemoglobin A1C (%)   Date Value   06/01/2018 6.2   11/14/2017 6.2   11/01/2016 6.5 (H)             ( goal A1C is < 7)   Microalb/Crt.  Ratio (mcg/mg creat)   Date Value   06/01/2018 CANNOT BE CALCULATED     LDL Cholesterol (mg/dL)   Date Value   06/01/2018 141 (H)     LDL Calculated (mg/dL)   Date Value   04/04/2014 253 (A)       (goal LDL is <100)   AST (U/L)   Date Value   02/16/2018 15     ALT (U/L)   Date Value   02/16/2018 19     BUN (mg/dL)   Date Value   06/01/2018 8     BP Readings from Last 3 Encounters:   07/10/18 112/62   06/01/18 134/81   04/30/18 136/81          (goal 120/80)        Patient Active Problem List:     Bipolar disorder ([facility] [address]     Scoliosis     Depression     Hyperlipemia     Essential hypertension     Prediabetes      ----JF

## 2018-10-30 RX ORDER — ERGOCALCIFEROL 1.25 MG/1
CAPSULE ORAL
Qty: 8 CAPSULE | Refills: 0 | Status: SHIPPED | OUTPATIENT
Start: 2018-10-30 | End: 2018-12-23 | Stop reason: SDUPTHER

## 2018-11-06 ENCOUNTER — OFFICE VISIT (OUTPATIENT)
Dept: INTERNAL MEDICINE | Age: 48
End: 2018-11-06
Payer: COMMERCIAL

## 2018-11-06 VITALS
SYSTOLIC BLOOD PRESSURE: 136 MMHG | DIASTOLIC BLOOD PRESSURE: 80 MMHG | WEIGHT: 139 LBS | HEART RATE: 81 BPM | BODY MASS INDEX: 23.86 KG/M2

## 2018-11-06 DIAGNOSIS — J00 ACUTE NASOPHARYNGITIS: Primary | ICD-10-CM

## 2018-11-06 DIAGNOSIS — F17.200 SMOKER: ICD-10-CM

## 2018-11-06 DIAGNOSIS — I10 ESSENTIAL HYPERTENSION: ICD-10-CM

## 2018-11-06 DIAGNOSIS — R73.03 PREDIABETES: ICD-10-CM

## 2018-11-06 PROCEDURE — 99211 OFF/OP EST MAY X REQ PHY/QHP: CPT | Performed by: INTERNAL MEDICINE

## 2018-11-06 PROCEDURE — G8420 CALC BMI NORM PARAMETERS: HCPCS | Performed by: STUDENT IN AN ORGANIZED HEALTH CARE EDUCATION/TRAINING PROGRAM

## 2018-11-06 PROCEDURE — 4004F PT TOBACCO SCREEN RCVD TLK: CPT | Performed by: STUDENT IN AN ORGANIZED HEALTH CARE EDUCATION/TRAINING PROGRAM

## 2018-11-06 PROCEDURE — G8484 FLU IMMUNIZE NO ADMIN: HCPCS | Performed by: STUDENT IN AN ORGANIZED HEALTH CARE EDUCATION/TRAINING PROGRAM

## 2018-11-06 PROCEDURE — 99213 OFFICE O/P EST LOW 20 MIN: CPT | Performed by: STUDENT IN AN ORGANIZED HEALTH CARE EDUCATION/TRAINING PROGRAM

## 2018-11-06 PROCEDURE — G8427 DOCREV CUR MEDS BY ELIG CLIN: HCPCS | Performed by: STUDENT IN AN ORGANIZED HEALTH CARE EDUCATION/TRAINING PROGRAM

## 2018-11-06 RX ORDER — LANCETS 30 GAUGE
EACH MISCELLANEOUS
Qty: 100 EACH | Refills: 3 | Status: SHIPPED | OUTPATIENT
Start: 2018-11-06 | End: 2019-02-26 | Stop reason: SDUPTHER

## 2018-11-06 RX ORDER — GUAIFENESIN/DEXTROMETHORPHAN 100-10MG/5
5 SYRUP ORAL 3 TIMES DAILY PRN
Qty: 120 ML | Refills: 1 | Status: SHIPPED | OUTPATIENT
Start: 2018-11-06 | End: 2018-11-16

## 2018-11-06 RX ORDER — BLOOD-GLUCOSE METER
1 KIT MISCELLANEOUS
Qty: 1 KIT | Refills: 0 | Status: SHIPPED | OUTPATIENT
Start: 2018-11-06 | End: 2022-04-19

## 2018-11-06 RX ORDER — BLOOD SUGAR DIAGNOSTIC
1 STRIP MISCELLANEOUS DAILY
Qty: 100 EACH | Refills: 2 | Status: SHIPPED | OUTPATIENT
Start: 2018-11-06

## 2018-11-06 RX ORDER — ECHINACEA PURPUREA EXTRACT 125 MG
1 TABLET ORAL PRN
Qty: 1 BOTTLE | Refills: 3 | Status: SHIPPED | OUTPATIENT
Start: 2018-11-06 | End: 2019-01-30 | Stop reason: SDUPTHER

## 2018-11-06 RX ORDER — BENZONATATE 100 MG/1
100 CAPSULE ORAL 3 TIMES DAILY PRN
Qty: 100 CAPSULE | Refills: 0 | Status: SHIPPED | OUTPATIENT
Start: 2018-11-06 | End: 2018-11-13

## 2018-11-14 ENCOUNTER — HOSPITAL ENCOUNTER (EMERGENCY)
Age: 48
Discharge: HOME | End: 2018-11-14
Attending: EMERGENCY MEDICINE
Payer: COMMERCIAL

## 2018-11-14 VITALS
SYSTOLIC BLOOD PRESSURE: 149 MMHG | HEART RATE: 86 BPM | TEMPERATURE: 98.7 F | DIASTOLIC BLOOD PRESSURE: 78 MMHG | RESPIRATION RATE: 18 BRPM | BODY MASS INDEX: 24.07 KG/M2 | WEIGHT: 141 LBS | OXYGEN SATURATION: 100 % | HEIGHT: 64 IN

## 2018-11-14 DIAGNOSIS — G89.29 CHRONIC RIGHT-SIDED LOW BACK PAIN WITHOUT SCIATICA: Primary | ICD-10-CM

## 2018-11-14 DIAGNOSIS — M54.50 CHRONIC RIGHT-SIDED LOW BACK PAIN WITHOUT SCIATICA: Primary | ICD-10-CM

## 2018-11-14 PROCEDURE — G0382 LEV 3 HOSP TYPE B ED VISIT: HCPCS

## 2018-11-14 RX ORDER — ACETAMINOPHEN 500 MG
500 TABLET ORAL EVERY 6 HOURS PRN
Qty: 120 TABLET | Refills: 3 | Status: SHIPPED | OUTPATIENT
Start: 2018-11-14 | End: 2021-07-08

## 2018-11-14 ASSESSMENT — PAIN DESCRIPTION - ORIENTATION: ORIENTATION: RIGHT;LOWER

## 2018-11-14 ASSESSMENT — PAIN DESCRIPTION - LOCATION: LOCATION: BACK

## 2018-11-14 ASSESSMENT — PAIN DESCRIPTION - DESCRIPTORS: DESCRIPTORS: SHARP

## 2018-11-14 ASSESSMENT — PAIN DESCRIPTION - PAIN TYPE: TYPE: ACUTE PAIN

## 2018-11-14 ASSESSMENT — PAIN SCALES - GENERAL: PAINLEVEL_OUTOF10: 10

## 2018-11-15 NOTE — ED PROVIDER NOTES
History:  reports that she has been smoking Cigarettes. She has been smoking about 0.50 packs per day. She has never used smokeless tobacco. She reports that she drinks about 1.8 oz of alcohol per week . She reports that she does not use drugs. Family History: Noncontributory at this time  Psychiatric History: Noncontributory at this time    Allergies:is allergic to benadryl [diphenhydramine]; buspar [buspirone]; codeine; flexeril [cyclobenzaprine]; ibuprofen; lamotrigine; paxil [paroxetine hcl]; pcn [penicillins]; simvastatin; and zoloft [sertraline hcl]. PHYSICAL EXAM       INITIAL VITALS: BP (!) 149/78   Pulse 86   Temp 98.7 °F (37.1 °C) (Oral)   Resp 18   Ht 5' 4\" (1.626 m)   Wt 141 lb (64 kg)   SpO2 100%   BMI 24.20 kg/m²     CONSTITUTIONAL: Vital signs reviewed, Alert and oriented X 3. HEAD: Atraumatic, Normocephalic. EYES: Eyes are normal to inspection, Pupils equal, round and reactive to light. NECK: Normal ROM, No jugular venous distention, No meningeal signs  RESPIRATORY CHEST: No respiratory distress. ABDOMEN: Abdomen is nontender, No distension. No pulsatile masses palpated. BACK:  No midline bony tenderness to palpation. + paraspinal muscle tenderness on the left and in the lumbar spine region. UPPER EXTREMITY: Inspection normal, No cyanosis. LOWER EXTREMITY: Pulses are 2+ and equal bilaterally with cap refill < 2 seconds. NEURO: GCS is 15.  5/5 strength in bilateral lower extremities with sensation to light touch intact. DTR's are 2+ bilaterally with bilateral downgoing babinski's. DP/PT pulses are 2+, strong, and equal bilaterally. SKIN: Skin is warm, Skin is dry. PSYCHIATRIC: Oriented X 3, Normal affect.      Diagnostic Results     LABS:  Not indicated    RADIOLOGY:  Not indicated    Medical decision making  (MDM) / ED Course     The patient presents with lumbar spine pain without signs of spinal cord compression, cauda equina syndrome, infection, aneurysm or other serious etiology. The patient is neurologically intact. Given the extremely low risk of these diagnoses further testing and evaluation for these possibilities does not appear to be indicated at this time. The patient has been instructed to return if the symptoms worsen or change in any way. Patient advised to return to her primary care physician for chronic management of her pain. She is also advised to consider getting a pain management referral.    Procedures     None    Final impression      1.  Chronic right-sided low back pain without sciatica         Disposition with plan     Disposition: Home    PATIENT REFERRED TO:  Sammi Claude, MD  [facility] [address] [phone]    Schedule an appointment as soon as possible for a visit       DISCHARGE MEDICATIONS:  Discharge Medication List as of 11/14/2018  6:18 PM      START taking these medications    Details   acetaminophen (APAP EXTRA STRENGTH) 500 MG tablet Take 1 tablet by mouth every 6 hours as needed for Pain, Disp-120 tablet, R-3Print             (Please note that portions of this note were completed with a voice recognition program.  Efforts were made to edit the dictations but occasionally words are mis-transcribed.)    Hattie Pacheco MD  Emergency Medicine Resident       Jeremy Lee MD  Resident  11/15/18 [id number]

## 2018-11-24 DIAGNOSIS — F17.200 SMOKER: ICD-10-CM

## 2018-12-23 DIAGNOSIS — F17.200 SMOKER: ICD-10-CM

## 2018-12-25 RX ORDER — ERGOCALCIFEROL 1.25 MG/1
CAPSULE ORAL
Qty: 8 CAPSULE | Refills: 0 | Status: SHIPPED | OUTPATIENT
Start: 2018-12-25 | End: 2019-04-07 | Stop reason: SDUPTHER

## 2019-01-11 ENCOUNTER — HOSPITAL ENCOUNTER (OUTPATIENT)
Dept: WOMENS IMAGING | Age: 49
Discharge: HOME | End: 2019-01-13
Payer: COMMERCIAL

## 2019-01-11 DIAGNOSIS — Z12.39 SCREENING FOR BREAST CANCER: ICD-10-CM

## 2019-01-11 PROCEDURE — 77063 BREAST TOMOSYNTHESIS BI: CPT

## 2019-01-30 DIAGNOSIS — I10 ESSENTIAL HYPERTENSION: ICD-10-CM

## 2019-01-30 DIAGNOSIS — F17.200 SMOKER: ICD-10-CM

## 2019-01-30 DIAGNOSIS — J00 ACUTE NASOPHARYNGITIS: ICD-10-CM

## 2019-01-30 DIAGNOSIS — E78.00 PURE HYPERCHOLESTEROLEMIA: ICD-10-CM

## 2019-01-31 RX ORDER — LISINOPRIL 5 MG/1
5 TABLET ORAL DAILY
Qty: 30 TABLET | Refills: 0 | Status: SHIPPED | OUTPATIENT
Start: 2019-01-31 | End: 2019-04-07 | Stop reason: SDUPTHER

## 2019-01-31 RX ORDER — ECHINACEA PURPUREA EXTRACT 125 MG
1 TABLET ORAL PRN
Qty: 1 BOTTLE | Refills: 5 | Status: SHIPPED | OUTPATIENT
Start: 2019-01-31

## 2019-01-31 RX ORDER — ATORVASTATIN CALCIUM 40 MG/1
40 TABLET, FILM COATED ORAL DAILY
Qty: 30 TABLET | Refills: 0 | Status: SHIPPED | OUTPATIENT
Start: 2019-01-31 | End: 2022-04-05 | Stop reason: SDUPTHER

## 2019-02-26 DIAGNOSIS — F17.200 SMOKER: ICD-10-CM

## 2019-02-26 DIAGNOSIS — R73.03 PREDIABETES: ICD-10-CM

## 2019-02-26 RX ORDER — LANCETS 30 GAUGE
EACH MISCELLANEOUS
Qty: 100 EACH | Refills: 5 | Status: SHIPPED | OUTPATIENT
Start: 2019-02-26 | End: 2020-01-27

## 2019-02-27 DIAGNOSIS — F17.200 SMOKER: ICD-10-CM

## 2019-02-27 RX ORDER — ALBUTEROL SULFATE 90 UG/1
2 AEROSOL, METERED RESPIRATORY (INHALATION) EVERY 6 HOURS PRN
Qty: 18 G | Refills: 0 | Status: SHIPPED | OUTPATIENT
Start: 2019-02-27 | End: 2019-04-07 | Stop reason: SDUPTHER

## 2019-03-04 DIAGNOSIS — I10 ESSENTIAL HYPERTENSION: ICD-10-CM

## 2019-03-05 RX ORDER — LISINOPRIL 5 MG/1
5 TABLET ORAL DAILY
Qty: 30 TABLET | Refills: 5 | Status: SHIPPED | OUTPATIENT
Start: 2019-03-05 | End: 2019-08-19 | Stop reason: SDUPTHER

## 2019-03-26 DIAGNOSIS — F17.200 SMOKER: ICD-10-CM

## 2019-04-07 DIAGNOSIS — F17.200 SMOKER: ICD-10-CM

## 2019-04-07 DIAGNOSIS — I10 ESSENTIAL HYPERTENSION: ICD-10-CM

## 2019-04-09 RX ORDER — ALBUTEROL SULFATE 90 UG/1
2 AEROSOL, METERED RESPIRATORY (INHALATION) EVERY 6 HOURS PRN
Qty: 18 G | Refills: 0 | Status: SHIPPED | OUTPATIENT
Start: 2019-04-09 | End: 2020-01-27 | Stop reason: SDUPTHER

## 2019-04-09 RX ORDER — LISINOPRIL 5 MG/1
5 TABLET ORAL DAILY
Qty: 30 TABLET | Refills: 0 | Status: SHIPPED | OUTPATIENT
Start: 2019-04-09 | End: 2020-01-27 | Stop reason: SDUPTHER

## 2019-04-09 RX ORDER — ERGOCALCIFEROL 1.25 MG/1
CAPSULE ORAL
Qty: 8 CAPSULE | Refills: 0 | Status: SHIPPED | OUTPATIENT
Start: 2019-04-09 | End: 2022-04-05

## 2019-04-23 DIAGNOSIS — R73.03 PREDIABETES: ICD-10-CM

## 2019-04-23 NOTE — TELEPHONE ENCOUNTER
Test strips pending for refill     Health Maintenance   Topic Date Due    Diabetic retinal exam  03/18/1979    Hepatitis B Vaccine (1 of 3 - Risk 3-dose series) 03/18/1988    Cervical cancer screen  03/18/1990    Shingles Vaccine (1 of 2) 03/18/2019    Colon cancer screen colonoscopy  03/18/2019    Diabetic foot exam  06/01/2019    A1C test (Diabetic or Prediabetic)  06/01/2019    Diabetic microalbuminuria test  06/01/2019    Lipid screen  06/01/2019    Potassium monitoring  06/01/2019    Creatinine monitoring  06/01/2019    Flu vaccine (Season Ended) 09/01/2019    Breast cancer screen  01/11/2021    DTaP/Tdap/Td vaccine (2 - Td) 11/14/2027    Pneumococcal 0-64 years Vaccine  Completed    HIV screen  Completed             (applicable per patient's age: Cancer Screenings, Depression Screening, Fall Risk Screening, Immunizations)    Hemoglobin A1C (%)   Date Value   06/01/2018 6.2   11/14/2017 6.2   11/01/2016 6.5 (H)     Microalb/Crt.  Ratio (mcg/mg creat)   Date Value   06/01/2018 CANNOT BE CALCULATED     LDL Cholesterol (mg/dL)   Date Value   06/01/2018 141 (H)     LDL Calculated (mg/dL)   Date Value   04/04/2014 253 (A)     AST (U/L)   Date Value   02/16/2018 15     ALT (U/L)   Date Value   02/16/2018 19     BUN (mg/dL)   Date Value   06/01/2018 8      (goal A1C is < 7)   (goal LDL is <100) need 30-50% reduction from baseline     BP Readings from Last 3 Encounters:   11/14/18 (!) 149/78   11/06/18 136/80   07/10/18 112/62    (goal /80)      All Future Testing planned in [facility]:  Lab Frequency Next Occurrence   DEBBY DIGITAL SCREENING AUGMENTED BILAT Once 04/15/2019   Spirometry with bronchodilator Once 06/15/2018       Next Visit Date:  Future Appointments   Date Time Provider Jyoti Yee   5/7/2019  9:20 Amol Min MD [facility] [address]            Patient Active Problem List:     Bipolar disorder ([facility] [address]     Scoliosis     Depression     Hyperlipemia     Essential hypertension Prediabetes

## 2019-04-24 RX ORDER — GLUCOSAMINE HCL/CHONDROITIN SU 500-400 MG
CAPSULE ORAL
Qty: 300 STRIP | Refills: 2 | Status: SHIPPED | OUTPATIENT
Start: 2019-04-24 | End: 2020-01-27 | Stop reason: SDUPTHER

## 2019-04-29 DIAGNOSIS — F17.200 SMOKER: ICD-10-CM

## 2019-07-01 DIAGNOSIS — R73.03 PREDIABETES: ICD-10-CM

## 2019-07-03 RX ORDER — GLUCOSAMINE HCL/CHONDROITIN SU 500-400 MG
CAPSULE ORAL
Qty: 300 STRIP | Refills: 2 | Status: SHIPPED | OUTPATIENT
Start: 2019-07-03

## 2019-07-15 RX ORDER — ALBUTEROL SULFATE 90 UG/1
2 AEROSOL, METERED RESPIRATORY (INHALATION) EVERY 6 HOURS PRN
Qty: 18 G | Refills: 5 | Status: SHIPPED | OUTPATIENT
Start: 2019-07-15 | End: 2020-01-15

## 2019-07-24 ENCOUNTER — APPOINTMENT (OUTPATIENT)
Dept: GENERAL RADIOLOGY | Age: 49
End: 2019-07-24
Payer: COMMERCIAL

## 2019-07-24 ENCOUNTER — HOSPITAL ENCOUNTER (EMERGENCY)
Age: 49
Discharge: HOME | End: 2019-07-24
Attending: EMERGENCY MEDICINE
Payer: COMMERCIAL

## 2019-07-24 VITALS
TEMPERATURE: 98.1 F | SYSTOLIC BLOOD PRESSURE: 139 MMHG | HEIGHT: 65 IN | WEIGHT: 138 LBS | BODY MASS INDEX: 22.99 KG/M2 | RESPIRATION RATE: 16 BRPM | DIASTOLIC BLOOD PRESSURE: 78 MMHG | OXYGEN SATURATION: 90 % | HEART RATE: 84 BPM

## 2019-07-24 DIAGNOSIS — S96.911A RIGHT FOOT STRAIN, INITIAL ENCOUNTER: ICD-10-CM

## 2019-07-24 DIAGNOSIS — M19.079 ARTHRITIS OF FOOT: Primary | ICD-10-CM

## 2019-07-24 DIAGNOSIS — M77.31 HEEL SPUR, RIGHT: ICD-10-CM

## 2019-07-24 PROCEDURE — 6370000000 HC RX 637 (ALT 250 FOR IP): Performed by: PHYSICIAN ASSISTANT

## 2019-07-24 PROCEDURE — 73610 X-RAY EXAM OF ANKLE: CPT

## 2019-07-24 PROCEDURE — 99283 EMERGENCY DEPT VISIT LOW MDM: CPT

## 2019-07-24 PROCEDURE — 73630 X-RAY EXAM OF FOOT: CPT

## 2019-07-24 RX ORDER — HYDROCODONE BITARTRATE AND ACETAMINOPHEN 5; 325 MG/1; MG/1
1 TABLET ORAL ONCE
Status: COMPLETED | OUTPATIENT
Start: 2019-07-24 | End: 2019-07-24

## 2019-07-24 RX ORDER — HYDROCODONE BITARTRATE AND ACETAMINOPHEN 5; 325 MG/1; MG/1
1-2 TABLET ORAL EVERY 8 HOURS PRN
Qty: 12 TABLET | Refills: 0 | Status: SHIPPED | OUTPATIENT
Start: 2019-07-24 | End: 2019-07-27

## 2019-07-24 RX ADMIN — HYDROCODONE BITARTRATE AND ACETAMINOPHEN 1 TABLET: 5; 325 TABLET ORAL at 15:14

## 2019-07-24 ASSESSMENT — PAIN SCALES - GENERAL
PAINLEVEL_OUTOF10: 7
PAINLEVEL_OUTOF10: 10

## 2019-07-24 NOTE — ED PROVIDER NOTES
extended release tablet Take 200 mg by mouth dailyHistorical Med      hydrOXYzine (VISTARIL) 25 MG capsule Take 25 mg by mouth nightly as needed for Anxiety      !! albuterol sulfate  (90 Base) MCG/ACT inhaler INHALE 2 PUFFS INTO THE LUNGS EVERY 6 HOURS AS NEEDED FOR WHEEZING, Disp-18 g, R-5Normal      !! blood glucose monitor strips 1 each by In Vitro route daily As needed. , Disp-300 strip, R-2, Normal      nicotine polacrilex (NICORETTE) 2 MG gum Take 1 each by mouth as needed for Smoking cessation, Disp-110 each, R-0Normal      !! blood glucose monitor strips 1 each by In Vitro route daily As needed. , Disp-300 strip, R-2, Normal      !! lisinopril (PRINIVIL;ZESTRIL) 5 MG tablet TAKE 1 TABLET BY MOUTH DAILY, Disp-30 tablet, R-0Normal      Lancets MISC Disp-100 each, R-5, NormalUse once a day      !! atorvastatin (LIPITOR) 40 MG tablet TAKE 1 TABLET BY MOUTH DAILY, Disp-30 tablet, R-0Normal      acetaminophen (APAP EXTRA STRENGTH) 500 MG tablet Take 1 tablet by mouth every 6 hours as needed for Pain, Disp-120 tablet, R-3Print      glucose monitoring kit (FREESTYLE) monitoring kit ONCE IN DIALYSIS Starting Tue 11/6/2018, 1 dose, Disp-1 kit, R-0, Normal      Alcohol Swabs (ALCOHOL PADS) 70 % PADS DAILY Starting Tue 11/6/2018, Disp-100 each, R-2, Normal      nicotine (NICODERM CQ) 7 MG/24HR Place 1 patch onto the skin every 24 hours, Disp-30 patch, R-3Normal      !! atorvastatin (LIPITOR) 40 MG tablet Take 1 tablet by mouth daily, Disp-30 tablet, R-5Normal      Blood Pressure Monitoring (BLOOD PRESSURE KIT) JOE Check BP daily, Disp-1 Device, R-0Print      docusate sodium (COLACE) 100 MG capsule Take 100 mg by mouth 2 times dailyHistorical Med       !! - Potential duplicate medications found. Please discuss with provider.           PAST MEDICAL HISTORY         Diagnosis Date    Anxiety     Bipolar disorder ([facility] [address]     Brain aneurysm 9/2015    Depression     Epilepsy ([facility] [address]     NOT ON ANY MEDICATION    hours as needed for Pain for up to 3 days. , Disp-12 tablet, R-0Print                 (Please note that portions of this note were completed with a voice recognition program.  Efforts were made to edit thedictations but occasionally words are mis-transcribed.)    THOMAS Forman PA-C  07/24/19 [address] [facility] Dory Chavez PA-C  07/24/19 [id number]

## 2019-07-24 NOTE — ED NOTES
Patient is brought in by sig other and presents with pain of the anterior left foot. Patient has no redness, swelling or warm to affected foot. Patient states that she has been having increased pain of the left foot but does not recall any injury. Patient is alert and oriented and taken to room in wheelchair.      Maria Antonia Matthew RN  07/24/19 [id number]

## 2019-08-19 DIAGNOSIS — I10 ESSENTIAL HYPERTENSION: ICD-10-CM

## 2019-08-20 RX ORDER — LISINOPRIL 5 MG/1
5 TABLET ORAL DAILY
Qty: 30 TABLET | Refills: 2 | Status: SHIPPED | OUTPATIENT
Start: 2019-08-20 | End: 2019-10-08 | Stop reason: SDUPTHER

## 2019-10-08 DIAGNOSIS — I10 ESSENTIAL HYPERTENSION: ICD-10-CM

## 2019-10-08 RX ORDER — LISINOPRIL 5 MG/1
5 TABLET ORAL DAILY
Qty: 30 TABLET | Refills: 5 | Status: SHIPPED | OUTPATIENT
Start: 2019-10-08 | End: 2020-05-05

## 2019-10-20 ENCOUNTER — APPOINTMENT (OUTPATIENT)
Dept: GENERAL RADIOLOGY | Age: 49
End: 2019-10-20
Payer: COMMERCIAL

## 2019-10-20 ENCOUNTER — HOSPITAL ENCOUNTER (EMERGENCY)
Age: 49
Discharge: HOME | End: 2019-10-20
Attending: EMERGENCY MEDICINE
Payer: COMMERCIAL

## 2019-10-20 VITALS
OXYGEN SATURATION: 95 % | SYSTOLIC BLOOD PRESSURE: 143 MMHG | BODY MASS INDEX: 21.63 KG/M2 | HEIGHT: 64 IN | DIASTOLIC BLOOD PRESSURE: 81 MMHG | WEIGHT: 126.7 LBS | TEMPERATURE: 98.2 F | HEART RATE: 94 BPM | RESPIRATION RATE: 14 BRPM

## 2019-10-20 DIAGNOSIS — R10.2 SUPRAPUBIC PAIN, ACUTE: ICD-10-CM

## 2019-10-20 DIAGNOSIS — R03.0 ELEVATED BLOOD PRESSURE READING: ICD-10-CM

## 2019-10-20 DIAGNOSIS — S39.012A BACK STRAIN, INITIAL ENCOUNTER: Primary | ICD-10-CM

## 2019-10-20 LAB
ABSOLUTE EOS #: 0.26 K/UL (ref 0–0.44)
ABSOLUTE IMMATURE GRANULOCYTE: 0.03 K/UL (ref 0–0.3)
ABSOLUTE LYMPH #: 3.45 K/UL (ref 1.1–3.7)
ABSOLUTE MONO #: 0.47 K/UL (ref 0.1–1.2)
ALBUMIN SERPL-MCNC: 4.7 G/DL (ref 3.5–5.2)
ALBUMIN/GLOBULIN RATIO: ABNORMAL (ref 1–2.5)
ALP BLD-CCNC: 44 U/L (ref 35–104)
ALT SERPL-CCNC: 12 U/L (ref 5–33)
ANION GAP SERPL CALCULATED.3IONS-SCNC: 13 MMOL/L (ref 9–17)
AST SERPL-CCNC: 15 U/L
BASOPHILS # BLD: 1 % (ref 0–2)
BASOPHILS ABSOLUTE: 0.08 K/UL (ref 0–0.2)
BILIRUB SERPL-MCNC: 0.46 MG/DL (ref 0.3–1.2)
BILIRUBIN URINE: NEGATIVE
BUN BLDV-MCNC: 6 MG/DL (ref 6–20)
BUN/CREAT BLD: 10 (ref 9–20)
CALCIUM SERPL-MCNC: 9.9 MG/DL (ref 8.6–10.4)
CHLORIDE BLD-SCNC: 99 MMOL/L (ref 98–107)
CHP ED QC CHECK: NORMAL
CO2: 26 MMOL/L (ref 20–31)
COLOR: YELLOW
COMMENT UA: ABNORMAL
CREAT SERPL-MCNC: 0.61 MG/DL (ref 0.5–0.9)
DIFFERENTIAL TYPE: NORMAL
EOSINOPHILS RELATIVE PERCENT: 3 % (ref 1–4)
GFR AFRICAN AMERICAN: >60 ML/MIN
GFR NON-AFRICAN AMERICAN: >60 ML/MIN
GFR SERPL CREATININE-BSD FRML MDRD: ABNORMAL ML/MIN/{1.73_M2}
GFR SERPL CREATININE-BSD FRML MDRD: ABNORMAL ML/MIN/{1.73_M2}
GLUCOSE BLD-MCNC: 131 MG/DL (ref 70–99)
GLUCOSE URINE: NEGATIVE
HCT VFR BLD CALC: 45.2 % (ref 36.3–47.1)
HEMOGLOBIN: 14.7 G/DL (ref 11.9–15.1)
IMMATURE GRANULOCYTES: 0 %
KETONES, URINE: NEGATIVE
LEUKOCYTE ESTERASE, URINE: NEGATIVE
LIPASE: 86 U/L (ref 13–60)
LYMPHOCYTES # BLD: 40 % (ref 24–43)
MCH RBC QN AUTO: 31.5 PG (ref 25.2–33.5)
MCHC RBC AUTO-ENTMCNC: 32.5 G/DL (ref 28.4–34.8)
MCV RBC AUTO: 96.8 FL (ref 82.6–102.9)
MONOCYTES # BLD: 6 % (ref 3–12)
NITRITE, URINE: NEGATIVE
NRBC AUTOMATED: 0 PER 100 WBC
PDW BLD-RTO: 12.3 % (ref 11.8–14.4)
PH UA: 5.5 (ref 5–8)
PLATELET # BLD: 240 K/UL (ref 138–453)
PLATELET ESTIMATE: NORMAL
PMV BLD AUTO: 9.8 FL (ref 8.1–13.5)
POTASSIUM SERPL-SCNC: 4.2 MMOL/L (ref 3.7–5.3)
PROTEIN UA: NEGATIVE
RBC # BLD: 4.67 M/UL (ref 3.95–5.11)
RBC # BLD: NORMAL 10*6/UL
SEG NEUTROPHILS: 50 % (ref 36–65)
SEGMENTED NEUTROPHILS ABSOLUTE COUNT: 4.26 K/UL (ref 1.5–8.1)
SODIUM BLD-SCNC: 138 MMOL/L (ref 135–144)
SPECIFIC GRAVITY UA: 1 (ref 1–1.03)
TOTAL PROTEIN: 7.6 G/DL (ref 6.4–8.3)
TURBIDITY: CLEAR
URINE HGB: NEGATIVE
UROBILINOGEN, URINE: NORMAL
WBC # BLD: 8.6 K/UL (ref 3.5–11.3)
WBC # BLD: NORMAL 10*3/UL

## 2019-10-20 PROCEDURE — 81003 URINALYSIS AUTO W/O SCOPE: CPT

## 2019-10-20 PROCEDURE — 85025 COMPLETE CBC W/AUTO DIFF WBC: CPT

## 2019-10-20 PROCEDURE — 72100 X-RAY EXAM L-S SPINE 2/3 VWS: CPT

## 2019-10-20 PROCEDURE — 80053 COMPREHEN METABOLIC PANEL: CPT

## 2019-10-20 PROCEDURE — 72220 X-RAY EXAM SACRUM TAILBONE: CPT

## 2019-10-20 PROCEDURE — 83690 ASSAY OF LIPASE: CPT

## 2019-10-20 PROCEDURE — 99283 EMERGENCY DEPT VISIT LOW MDM: CPT

## 2019-10-20 PROCEDURE — 36415 COLL VENOUS BLD VENIPUNCTURE: CPT

## 2019-10-20 PROCEDURE — 6370000000 HC RX 637 (ALT 250 FOR IP): Performed by: PHYSICIAN ASSISTANT

## 2019-10-20 RX ORDER — HYDROCODONE BITARTRATE AND ACETAMINOPHEN 5; 325 MG/1; MG/1
1 TABLET ORAL ONCE
Status: COMPLETED | OUTPATIENT
Start: 2019-10-20 | End: 2019-10-20

## 2019-10-20 RX ORDER — METHOCARBAMOL 750 MG/1
750 TABLET, FILM COATED ORAL 4 TIMES DAILY
Qty: 40 TABLET | Refills: 0 | Status: SHIPPED | OUTPATIENT
Start: 2019-10-20 | End: 2019-10-30

## 2019-10-20 RX ORDER — DICYCLOMINE HCL 20 MG
20 TABLET ORAL EVERY 6 HOURS
Qty: 40 TABLET | Refills: 0 | Status: SHIPPED | OUTPATIENT
Start: 2019-10-20 | End: 2020-01-27 | Stop reason: ALTCHOICE

## 2019-10-20 RX ADMIN — HYDROCODONE BITARTRATE AND ACETAMINOPHEN 1 TABLET: 5; 325 TABLET ORAL at 16:37

## 2019-10-20 ASSESSMENT — PAIN DESCRIPTION - LOCATION: LOCATION: COCCYX;ABDOMEN

## 2019-10-20 ASSESSMENT — PAIN DESCRIPTION - FREQUENCY: FREQUENCY: CONTINUOUS

## 2019-10-20 ASSESSMENT — PAIN SCALES - GENERAL
PAINLEVEL_OUTOF10: 10
PAINLEVEL_OUTOF10: 9

## 2019-10-20 ASSESSMENT — PAIN SCALES - WONG BAKER: WONGBAKER_NUMERICALRESPONSE: 10

## 2019-10-20 ASSESSMENT — PAIN DESCRIPTION - DESCRIPTORS: DESCRIPTORS: SHARP

## 2020-01-02 RX ORDER — ALBUTEROL SULFATE 90 UG/1
2 AEROSOL, METERED RESPIRATORY (INHALATION) EVERY 6 HOURS PRN
Qty: 18 G | Refills: 5 | OUTPATIENT
Start: 2020-01-02

## 2020-01-13 NOTE — TELEPHONE ENCOUNTER
Request for albuterol - medication pended. Unable to reach patient to schedule appointment. Please fill if appropriate. Next Visit Date:  No future appointments. Health Maintenance   Topic Date Due    Cervical cancer screen  03/18/1990    Shingles Vaccine (1 of 2) 03/18/2019    Colon cancer screen colonoscopy  03/18/2019    A1C test (Diabetic or Prediabetic)  06/01/2019    Lipid screen  06/01/2019    Flu vaccine (1) 09/01/2019    Potassium monitoring  10/20/2020    Creatinine monitoring  10/20/2020    Breast cancer screen  01/11/2021    DTaP/Tdap/Td vaccine (2 - Td) 11/14/2027    Pneumococcal 0-64 years Vaccine  Completed    HIV screen  Completed       Hemoglobin A1C (%)   Date Value   06/01/2018 6.2   11/14/2017 6.2   11/01/2016 6.5 (H)             ( goal A1C is < 7)   Microalb/Crt.  Ratio (mcg/mg creat)   Date Value   06/01/2018 CANNOT BE CALCULATED     LDL Cholesterol (mg/dL)   Date Value   06/01/2018 141 (H)     LDL Calculated (mg/dL)   Date Value   04/04/2014 253 (A)       (goal LDL is <100)   AST (U/L)   Date Value   10/20/2019 15     ALT (U/L)   Date Value   10/20/2019 12     BUN (mg/dL)   Date Value   10/20/2019 6     BP Readings from Last 3 Encounters:   10/20/19 (!) 143/81   07/24/19 139/78   11/14/18 (!) 149/78          (goal 120/80)    All Future Testing planned in [facility]  Lab Frequency Next Occurrence         Patient Active Problem List:     Bipolar disorder ([facility] [address]     Scoliosis     Depression     Hyperlipemia     Essential hypertension     Prediabetes

## 2020-01-15 RX ORDER — ALBUTEROL SULFATE 90 UG/1
2 AEROSOL, METERED RESPIRATORY (INHALATION) EVERY 6 HOURS PRN
Qty: 18 G | Refills: 5 | Status: SHIPPED | OUTPATIENT
Start: 2020-01-15 | End: 2022-04-19 | Stop reason: SDUPTHER

## 2020-01-27 ENCOUNTER — OFFICE VISIT (OUTPATIENT)
Dept: FAMILY MEDICINE CLINIC | Age: 50
End: 2020-01-27
Payer: COMMERCIAL

## 2020-01-27 ENCOUNTER — HOSPITAL ENCOUNTER (OUTPATIENT)
Age: 50
Discharge: HOME | End: 2020-01-27
Payer: COMMERCIAL

## 2020-01-27 VITALS
HEIGHT: 65 IN | DIASTOLIC BLOOD PRESSURE: 68 MMHG | WEIGHT: 132 LBS | SYSTOLIC BLOOD PRESSURE: 138 MMHG | OXYGEN SATURATION: 96 % | HEART RATE: 82 BPM | BODY MASS INDEX: 21.99 KG/M2

## 2020-01-27 PROBLEM — K21.9 GASTROESOPHAGEAL REFLUX DISEASE WITHOUT ESOPHAGITIS: Status: ACTIVE | Noted: 2020-01-27

## 2020-01-27 PROBLEM — E11.9 TYPE 2 DIABETES MELLITUS WITHOUT COMPLICATION, WITHOUT LONG-TERM CURRENT USE OF INSULIN (HCC): Status: ACTIVE | Noted: 2020-01-27

## 2020-01-27 PROBLEM — E55.9 VITAMIN D DEFICIENCY: Status: ACTIVE | Noted: 2020-01-27

## 2020-01-27 PROBLEM — F33.0 MILD EPISODE OF RECURRENT MAJOR DEPRESSIVE DISORDER (HCC): Status: ACTIVE | Noted: 2020-01-27

## 2020-01-27 LAB — HBA1C MFR BLD: 5.4 %

## 2020-01-27 PROCEDURE — G8427 DOCREV CUR MEDS BY ELIG CLIN: HCPCS | Performed by: FAMILY MEDICINE

## 2020-01-27 PROCEDURE — G8484 FLU IMMUNIZE NO ADMIN: HCPCS | Performed by: FAMILY MEDICINE

## 2020-01-27 PROCEDURE — 2022F DILAT RTA XM EVC RTNOPTHY: CPT | Performed by: FAMILY MEDICINE

## 2020-01-27 PROCEDURE — 82044 UR ALBUMIN SEMIQUANTITATIVE: CPT | Performed by: FAMILY MEDICINE

## 2020-01-27 PROCEDURE — 4004F PT TOBACCO SCREEN RCVD TLK: CPT | Performed by: FAMILY MEDICINE

## 2020-01-27 PROCEDURE — 3017F COLORECTAL CA SCREEN DOC REV: CPT | Performed by: FAMILY MEDICINE

## 2020-01-27 PROCEDURE — G8420 CALC BMI NORM PARAMETERS: HCPCS | Performed by: FAMILY MEDICINE

## 2020-01-27 PROCEDURE — 3044F HG A1C LEVEL LT 7.0%: CPT | Performed by: FAMILY MEDICINE

## 2020-01-27 PROCEDURE — 93005 ELECTROCARDIOGRAM TRACING: CPT

## 2020-01-27 PROCEDURE — 83036 HEMOGLOBIN GLYCOSYLATED A1C: CPT | Performed by: FAMILY MEDICINE

## 2020-01-27 PROCEDURE — 99214 OFFICE O/P EST MOD 30 MIN: CPT | Performed by: FAMILY MEDICINE

## 2020-01-27 RX ORDER — OMEPRAZOLE 20 MG/1
20 CAPSULE, DELAYED RELEASE ORAL DAILY
Qty: 30 CAPSULE | Refills: 1 | Status: SHIPPED | OUTPATIENT
Start: 2020-01-27 | End: 2020-02-17

## 2020-01-27 RX ORDER — ERGOCALCIFEROL 1.25 MG/1
CAPSULE ORAL
Qty: 8 CAPSULE | Refills: 0 | Status: CANCELLED | OUTPATIENT
Start: 2020-01-27

## 2020-01-27 RX ORDER — PROMETHAZINE HYDROCHLORIDE 25 MG/1
12.5-25 TABLET ORAL
COMMUNITY
Start: 2018-11-24

## 2020-01-27 ASSESSMENT — ENCOUNTER SYMPTOMS
SHORTNESS OF BREATH: 0
ABDOMINAL PAIN: 0
SORE THROAT: 0
NAUSEA: 0

## 2020-01-27 NOTE — PROGRESS NOTES
Subjective:      Patient ID: Judi Simon is a 47 y.o. female. Visit Information    Have you changed or started any medications since your last visit including any over-the-counter medicines, vitamins, or herbal medicines? no   Have you stopped taking any of your medications? Is so, why? -  no  Are you having any side effects from any of your medications? - no    Have you seen any other physician or provider since your last visit?  no   Have you had any other diagnostic tests since your last visit?  no   Have you been seen in the emergency room and/or had an admission in a hospital since we last saw you?  no   Have you had your routine dental cleaning in the past 6 months?  no     Do you have an active MyChart account? If no, what is the barrier? Yes    Patient Care Team:  Ian Key MD as PCP - General (Internal Medicine)    Medical History Review  Past Medical, Family, and Social History reviewed and does contribute to the patient presenting condition    Health Maintenance   Topic Date Due    Cervical cancer screen  03/18/1990    Shingles Vaccine (1 of 2) 03/18/2019    Colon cancer screen colonoscopy  03/18/2019    A1C test (Diabetic or Prediabetic)  06/01/2019    Lipid screen  06/01/2019    Flu vaccine (1) 09/01/2019    Potassium monitoring  10/20/2020    Creatinine monitoring  10/20/2020    Breast cancer screen  01/11/2021    DTaP/Tdap/Td vaccine (2 - Td) 11/14/2027    Pneumococcal 0-64 years Vaccine  Completed    HIV screen  Completed             HPI  Is 60-year-old female is seen in the office today first time by me has history of diabetes is on metformin blood sugars are running good her hemoglobin A1c is also good, has hypertension blood pressure is controlled on Zestril has history of depression and is on medication from her psychiatrist due for her blood work denies of any numbness or tingling in her feet  Review of Systems   Constitutional: Negative for appetite change.    HENT: disease without esophagitis  omeprazole (PRILOSEC) 20 MG delayed release capsule   6. Vitamin D deficiency  Vitamin D 25 Hydroxy   7. Fatigue, unspecified type  TSH without Reflex    T4, Free   8. Mild episode of recurrent major depressive disorder ([facility] [address]   the care of a psychiatrist             Plan:         Orders Placed This Encounter   Procedures    Comprehensive Metabolic Panel     Standing Status:   Future     Standing Expiration Date:   1/26/2021    TSH without Reflex     Standing Status:   Future     Standing Expiration Date:   1/26/2021    T4, Free     Standing Status:   Future     Standing Expiration Date:   1/26/2021    Vitamin D 25 Hydroxy     Standing Status:   Future     Standing Expiration Date:   1/26/2021    Comprehensive Metabolic Panel     Standing Status:   Future     Standing Expiration Date:   1/26/2021    Lipid Panel     Standing Status:   Future     Standing Expiration Date:   1/26/2021     Order Specific Question:   Is Patient Fasting?/# of Hours     Answer:   12    POCT glycosylated hemoglobin (Hb A1C)    POCT FECAL IMMUNOCHEMICAL TEST (FIT)     Standing Status:   Future     Standing Expiration Date:   1/27/2021    POCT microalbumin    EKG 12 Lead     Standing Status:   Future     Number of Occurrences:   1     Standing Expiration Date:   1/27/2021     Order Specific Question:   Reason for Exam?     Answer:   Hypertension     Orders Placed This Encounter   Medications    omeprazole (PRILOSEC) 20 MG delayed release capsule     Sig: Take 1 capsule by mouth daily     Dispense:  30 capsule     Refill:  1     Return in about 2 weeks (around 2/10/2020) for diabetes.     Continue current medications reviewed from the chart            Mendiola [place], [address]

## 2020-01-28 LAB
EKG ATRIAL RATE: 75 BPM
EKG P AXIS: 75 DEGREES
EKG P-R INTERVAL: 150 MS
EKG Q-T INTERVAL: 404 MS
EKG QRS DURATION: 96 MS
EKG QTC CALCULATION (BAZETT): 451 MS
EKG R AXIS: 52 DEGREES
EKG T AXIS: 62 DEGREES
EKG VENTRICULAR RATE: 75 BPM

## 2020-01-28 PROCEDURE — 93010 ELECTROCARDIOGRAM REPORT: CPT | Performed by: INTERNAL MEDICINE

## 2020-04-13 ENCOUNTER — TELEPHONE (OUTPATIENT)
Dept: PHARMACY | Age: 50
End: 2020-04-13

## 2020-04-15 ENCOUNTER — TELEPHONE (OUTPATIENT)
Dept: FAMILY MEDICINE CLINIC | Age: 50
End: 2020-04-15

## 2020-04-15 NOTE — TELEPHONE ENCOUNTER
Patient called stated she is all out of Diabetic medication, asking for refill.  Please advise       Left message to call me will discuss meds

## 2020-04-17 ENCOUNTER — TELEPHONE (OUTPATIENT)
Dept: FAMILY MEDICINE CLINIC | Age: 50
End: 2020-04-17

## 2020-04-17 RX ORDER — OMEPRAZOLE 20 MG/1
20 CAPSULE, DELAYED RELEASE ORAL DAILY
Qty: 30 CAPSULE | Refills: 0 | Status: SHIPPED | OUTPATIENT
Start: 2020-04-17 | End: 2020-05-13

## 2020-04-21 NOTE — TELEPHONE ENCOUNTER
Medication Management Service    Patient's Name: Irma Martinez YOB: 1970            ______________________________________________________________________    Date of patient's visit: 4/21/2020    Subjective/Objective: Irma Martinez contacted by pharmacy regarding the targeted intervention(s) listed below as identified by OutcomesMTM. Targeted Interventions:  [] Medication adherence   [] Patient education  [] Suboptimal drug   [] Medication assessment  [] Cost-effective alternative  [x] Needs drug therapy - Inhaled Steroid (ROSARIO Monotherapy)  [] Needs laboratory monitoring  [] Drug interaction  [] Unnecessary therapy  [] Adverse drug reaction  [] Inappropriate administration  ______________________________________________________________________    Assessment/Plan:  Patient contacted on three separate dates (4/13, 4/20, and 4/21). Will submit claim as Unable to Reach Patient After 3 Attempts. Does not appear to require maintenance inhaler as patient is not diagnosed with asthma or COPD. Doreen Perales, Pharm.  D.  PGY2 Ambulatory Care Pharmacist [facility]) Medication Management Service  [phone]  4/21/2020 10:33 AM

## 2020-04-22 ENCOUNTER — TELEPHONE (OUTPATIENT)
Dept: FAMILY MEDICINE CLINIC | Age: 50
End: 2020-04-22

## 2020-05-05 RX ORDER — LISINOPRIL 5 MG/1
5 TABLET ORAL DAILY
Qty: 30 TABLET | Refills: 0 | Status: SHIPPED | OUTPATIENT
Start: 2020-05-05 | End: 2020-06-03

## 2020-05-13 RX ORDER — OMEPRAZOLE 20 MG/1
20 CAPSULE, DELAYED RELEASE ORAL DAILY
Qty: 30 CAPSULE | Refills: 3 | Status: SHIPPED | OUTPATIENT
Start: 2020-05-13 | End: 2022-04-05 | Stop reason: SDUPTHER

## 2020-06-08 ENCOUNTER — TELEPHONE (OUTPATIENT)
Dept: FAMILY MEDICINE CLINIC | Age: 50
End: 2020-06-08

## 2020-12-12 ENCOUNTER — HOSPITAL ENCOUNTER (EMERGENCY)
Age: 50
Discharge: HOME | End: 2020-12-12
Attending: EMERGENCY MEDICINE
Payer: COMMERCIAL

## 2020-12-12 VITALS
HEART RATE: 95 BPM | TEMPERATURE: 97.7 F | RESPIRATION RATE: 16 BRPM | OXYGEN SATURATION: 99 % | WEIGHT: 137 LBS | HEIGHT: 65 IN | SYSTOLIC BLOOD PRESSURE: 130 MMHG | BODY MASS INDEX: 22.82 KG/M2 | DIASTOLIC BLOOD PRESSURE: 90 MMHG

## 2020-12-12 PROCEDURE — 6360000002 HC RX W HCPCS: Performed by: STUDENT IN AN ORGANIZED HEALTH CARE EDUCATION/TRAINING PROGRAM

## 2020-12-12 PROCEDURE — 96372 THER/PROPH/DIAG INJ SC/IM: CPT

## 2020-12-12 PROCEDURE — 6370000000 HC RX 637 (ALT 250 FOR IP): Performed by: STUDENT IN AN ORGANIZED HEALTH CARE EDUCATION/TRAINING PROGRAM

## 2020-12-12 PROCEDURE — 99283 EMERGENCY DEPT VISIT LOW MDM: CPT

## 2020-12-12 RX ORDER — KETOROLAC TROMETHAMINE 30 MG/ML
30 INJECTION, SOLUTION INTRAMUSCULAR; INTRAVENOUS ONCE
Status: COMPLETED | OUTPATIENT
Start: 2020-12-12 | End: 2020-12-12

## 2020-12-12 RX ORDER — CLINDAMYCIN HYDROCHLORIDE 150 MG/1
450 CAPSULE ORAL 3 TIMES DAILY
Qty: 45 CAPSULE | Refills: 0 | Status: SHIPPED | OUTPATIENT
Start: 2020-12-12 | End: 2020-12-17

## 2020-12-12 RX ORDER — IBUPROFEN 800 MG/1
800 TABLET ORAL EVERY 8 HOURS PRN
Qty: 30 TABLET | Refills: 0 | Status: SHIPPED | OUTPATIENT
Start: 2020-12-12 | End: 2022-04-05

## 2020-12-12 RX ORDER — CLINDAMYCIN HYDROCHLORIDE 150 MG/1
450 CAPSULE ORAL ONCE
Status: COMPLETED | OUTPATIENT
Start: 2020-12-12 | End: 2020-12-12

## 2020-12-12 RX ADMIN — CLINDAMYCIN HYDROCHLORIDE 450 MG: 150 CAPSULE ORAL at 22:02

## 2020-12-12 RX ADMIN — KETOROLAC TROMETHAMINE 30 MG: 30 INJECTION, SOLUTION INTRAMUSCULAR at 22:02

## 2020-12-12 ASSESSMENT — ENCOUNTER SYMPTOMS
SHORTNESS OF BREATH: 0
SORE THROAT: 0
VOMITING: 0
ABDOMINAL PAIN: 0
NAUSEA: 0
BACK PAIN: 0
EYE PAIN: 0

## 2020-12-12 ASSESSMENT — PAIN DESCRIPTION - ONSET: ONSET: GRADUAL

## 2020-12-12 ASSESSMENT — PAIN DESCRIPTION - PROGRESSION: CLINICAL_PROGRESSION: GRADUALLY WORSENING

## 2020-12-12 ASSESSMENT — PAIN DESCRIPTION - ORIENTATION: ORIENTATION: RIGHT

## 2020-12-12 ASSESSMENT — PAIN DESCRIPTION - DIRECTION: RADIATING_TOWARDS: EAR

## 2020-12-12 ASSESSMENT — PAIN DESCRIPTION - FREQUENCY: FREQUENCY: CONTINUOUS

## 2020-12-12 ASSESSMENT — PAIN DESCRIPTION - DESCRIPTORS: DESCRIPTORS: THROBBING

## 2020-12-12 ASSESSMENT — PAIN SCALES - GENERAL: PAINLEVEL_OUTOF10: 10

## 2020-12-12 ASSESSMENT — PAIN DESCRIPTION - LOCATION: LOCATION: TEETH

## 2020-12-12 ASSESSMENT — PAIN DESCRIPTION - PAIN TYPE: TYPE: ACUTE PAIN

## 2020-12-13 NOTE — ED PROVIDER NOTES
[facility] ED  Emergency Department Encounter  Emergency Medicine Resident     Pt Name: Robyn GOMEZ:[id number]  [place] 1970  Date of evaluation: 12/12/20  PCP:  No primary care provider on file. CHIEF COMPLAINT       Chief Complaint   Patient presents with    Dental Pain       HISTORY OF PRESENT ILLNESS  (Location/Symptom, Timing/Onset, Context/Setting, Quality, Duration, ModifyingFactors, Severity.)      Robyn Thrasher is a 45 y.o. female presented with right lower dental pain for the last week. Was seen by a dentist several days ago, was can have it pulled her blood pressure was too high so they decided not to. She states it started after they made her swish some fluid in the office. She has been trying Tylenol ibuprofen with minimal relief. Still able to tolerate solids and liquids. Denies any fevers, sore throat, cough, chest pain or shortness of breath, nausea or vomiting    PAST MEDICAL / SURGICAL / SOCIAL /FAMILY HISTORY      has a past medical history of Anxiety, Bipolar disorder ([address] Brain aneurysm, Depression, Epilepsy ([address] Essential hypertension, Gastroesophageal reflux disease without esophagitis, Headache, Hyperlipidemia, Liver disease, Manic depression ([address] Pancreatitis, Scoliosis, and Seasonal allergies. No other pertinent PMH on review with patient/guardian. has a past surgical history that includes hernia repair; Tonsillectomy; other surgical history (09/22/2015); Hysterectomy; hc picc power 5f triple (9/23/2015); and brain surgery. No other pertinent PSH on review with patient/guardian.   Social History     Socioeconomic History    Marital status: Single     Spouse name: Not on file    Number of children: Not on file    Years of education: Not on file    Highest education level: Not on file   Occupational History    Not on file   Social Needs    Financial resource strain: Not on file    Food insecurity     Worry: Not on file Inability: Not on file    Transportation needs     Medical: Not on file     Non-medical: Not on file   Tobacco Use    Smoking status: Current Every Day Smoker     Packs/day: 0.50     Types: Cigarettes     Last attempt to quit: 2015     Years since quittin.2    Smokeless tobacco: Never Used   Substance and Sexual Activity    Alcohol use: Yes     Alcohol/week: 3.0 standard drinks     Types: 3 Cans of beer per week     Comment: 3 cans per week     Drug use: No    Sexual activity: Never   Lifestyle    Physical activity     Days per week: Not on file     Minutes per session: Not on file    Stress: Not on file   Relationships    Social connections     Talks on phone: Not on file     Gets together: Not on file     Attends Voodoo service: Not on file     Active member of club or organization: Not on file     Attends meetings of clubs or organizations: Not on file     Relationship status: Not on file    Intimate partner violence     Fear of current or ex partner: Not on file     Emotionally abused: Not on file     Physically abused: Not on file     Forced sexual activity: Not on file   Other Topics Concern    Not on file   Social History Narrative    Not on file       I counseled the patient against using tobacco products. Family History   Problem Relation Age of Onset    Emphysema Mother     Seizures Sister     Lupus Sister      No other pertinent FamHx on review with patient/guardian. Allergies:  Benadryl [diphenhydramine], Buspar [buspirone], Codeine, Flexeril [cyclobenzaprine], Ibuprofen, Lamotrigine, Paxil [paroxetine hcl], Pcn [penicillins], Simvastatin, and Zoloft [sertraline hcl]    Home Medications:  Prior to Admission medications    Medication Sig Start Date End Date Taking?  Authorizing Provider   ibuprofen (ADVIL;MOTRIN) 800 MG tablet Take 1 tablet by mouth every 8 hours as needed for Pain 20  Yes Winston Valencia DO   clindamycin (CLEOCIN) 150 MG capsule Take 3 capsules by mouth 3 times daily for 5 days 12/12/20 12/17/20 Yes Winston Valencia DO   lisinopril (PRINIVIL;ZESTRIL) 5 MG tablet TAKE 1 TABLET BY MOUTH DAILY 6/3/20   Arron Zazueta MD   omeprazole (PRILOSEC) 20 MG delayed release capsule TAKE 1 CAPSULE BY MOUTH DAILY 5/13/20   Kylie Cobb MD   Pharmacist Choice Lancets MISC USE ONCE A DAY 5/13/20   Arron Zazueta MD   promethazine (PHENERGAN) 25 MG tablet Take 12.5-25 mg by mouth 11/24/18   Historical Provider, MD   levomilnacipran (FETZIMA) 40 MG CP24 extended release capsule Take 40 mg by mouth    Historical Provider, MD   albuterol sulfate  (90 Base) MCG/ACT inhaler INHALE 2 PUFFS INTO THE LUNGS EVERY 6 HOURS AS NEEDED FOR WHEEZING 1/15/20   Michelle Casanova MD   blood glucose monitor strips 1 each by In Vitro route daily As needed.  7/3/19   Michelle Casanova MD   vitamin D (ERGOCALCIFEROL) 31631 units CAPS capsule TAKE 1 CAPSULE BY MOUTH 1 TIME A WEEK FOR 8 DOSES 4/9/19   Michelle Casanova MD   sodium chloride (OCEAN) 0.65 % nasal spray 1 spray by Nasal route as needed for Congestion 1/31/19   Michelle Casanova MD   atorvastatin (LIPITOR) 40 MG tablet TAKE 1 TABLET BY MOUTH DAILY  Patient not taking: Reported on 1/27/2020 1/31/19   Michelle Casanova MD   acetaminophen (APAP EXTRA STRENGTH) 500 MG tablet Take 1 tablet by mouth every 6 hours as needed for Pain 11/14/18   Katie Man MD   glucose monitoring kit (FREESTYLE) monitoring kit 1 kit by Does not apply route Once in dialysis for 1 dose 11/6/18 11/6/18  Jh Antunez MD   Alcohol Swabs (ALCOHOL PADS) 70 % PADS 1 Units by Does not apply route daily 11/6/18   Jh Antunez MD   metFORMIN (GLUCOPHAGE) 500 MG tablet 500 mg PO daily for 1 week Then 500 mg PO twice daily 7/24/18   Michelle Casanova MD   Blood Pressure Monitoring (BLOOD PRESSURE KIT) JOE Check BP daily 4/30/18   Raji Oneill MD   DULoxetine (CYMBALTA) 60 MG extended release capsule Take 60 mg by mouth 2 times daily 1/18/18   Historical Provider, MD   QUEtiapine (SEROQUEL XR) 200 MG extended release tablet Take 200 mg by mouth daily 1/18/18   Historical Provider, MD   docusate sodium (COLACE) 100 MG capsule Take 100 mg by mouth 2 times daily    Historical Provider, MD   hydrOXYzine (VISTARIL) 25 MG capsule Take 25 mg by mouth 3 times daily     Historical Provider, MD       REVIEW OF SYSTEMS    (2-9 systems for level 4, 10 ormore for level 5)      Review of Systems   Constitutional: Negative for chills and fever. HENT: Positive for dental problem. Negative for drooling, ear discharge, ear pain and sore throat. Eyes: Negative for pain and visual disturbance. Respiratory: Negative for shortness of breath. Cardiovascular: Negative for chest pain. Gastrointestinal: Negative for abdominal pain, nausea and vomiting. Genitourinary: Negative for dysuria and hematuria. Musculoskeletal: Negative for back pain and neck pain. Skin: Negative for rash. Neurological: Negative for light-headedness and headaches. PHYSICAL EXAM   (up to 7 for level 4, 8 or more for level 5)      INITIAL VITALS:   BP (!) 130/90   Pulse 95   Temp 97.7 °F (36.5 °C) (Oral)   Ht 5' 5\" (1.651 m)   Wt 137 lb (62.1 kg)   BMI 22.80 kg/m²     Physical Exam  Constitutional:       General: She is not in acute distress. Appearance: She is well-developed. HENT:      Head: Normocephalic and atraumatic. Right Ear: Tympanic membrane and external ear normal.      Left Ear: Tympanic membrane and external ear normal.      Nose: Nose normal. No rhinorrhea. Mouth/Throat:      Mouth: Mucous membranes are moist.      Pharynx: Oropharynx is clear. No oropharyngeal exudate. Comments: Poor dental hygiene, significant dental carry along with tenderness to palpation with tooth #26, no erythema surrounding the tooth, no fluctuance, no signs of abscess, no signs of sublingual swelling or tongue elevation.   Eyes:      General:

## 2020-12-13 NOTE — ED NOTES
Pt arrived to ed c/o tooth cracked a week ago, and now is having severe pain into jaw, ear and head. Per pt, unable to eat/drink/sleep. Pain is 10/10.   Pt is alert, oriented, speaking clearly  Will continue to monitor     Maurizio Wilkinson RN  12/12/20 [phone]

## 2020-12-13 NOTE — ED PROVIDER NOTES
[facility]     Emergency Department     Faculty Attestation    I performed a history and physical examination of the patient and discussed management with the resident. I reviewed the residents note and agree with the documented findings and plan of care. Any areas of disagreement are noted on the chart. I was personally present for the key portions of any procedures. I have documented in the chart those procedures where I was not present during the key portions. I have reviewed the emergency nurses triage note. I agree with the chief complaint, past medical history, past surgical history, allergies, medications, social and family history as documented unless otherwise noted below. For Physician Assistant/ Nurse Practitioner cases/documentation I have personally evaluated this patient and have completed at least one if not all key elements of the E/M (history, physical exam, and MDM). Additional findings are as noted. Primary Care Physician:  No primary care provider on file. CHIEF COMPLAINT       Chief Complaint   Patient presents with    Dental Pain       RECENT VITALS:   Temp: 97.7 °F (36.5 °C),  Pulse: 95,  , BP: (!) 130/90    LABS:  Labs Reviewed - No data to display    Radiology  No orders to display         Attending Physician Additional  Notes    Patient is a 48-year-old female who presents for evaluation of dental pain. The patient reports that starting 1 week ago she developed gradual onset, constant, progressive, sharp, stabbing, nonradiating pain to her right lower molars. She was seen by her dentist earlier this week but her blood pressure was elevated so they could not pull her tooth. She states that she has history of hypertension and has been noncompliant with her medications. She has since been taking her medications and her blood pressure has improved. The patient has been taking ibuprofen and Tylenol without improvement.   She cannot see the dentist until next week. The patient does not list any other palliating factors. Her pain is worse with chewing. She denies fever, chills, headache, vision changes, neck pain, back pain, drooling, difficulty swallowing, cough, sore throat, abdominal pain, recent injury. Vital signs are stable. Heart regular rate and rhythm. Lungs clear to auscultation. Abdomen soft nontender. Poor dentition. Pain with palpation over the right lower molars. No drainable abscess. Posterior oropharynx is clear without any erythema, edema, exudate or asymmetry. Uvula midline. No trismus or malocclusion. No pain to palpation over the frontal or maxillary sinuses. No mastoid tenderness. Able to handle secretions. Normal speech. Patent airway. I suspect the patient has a developing dental infection. She was started on clindamycin. Patient was instructed to take ibuprofen or Tylenol as needed for pain. She was given a shot of Toradol here in the emergency department with improvement in pain. Plan to have her follow-up with her dentist and to take her medications as prescribed. She was instructed to return to the ED for worsening symptoms or any other concern. Have low suspicion for peritonsillar abscess, Bryant's angina, or epiglottitis. The patient understands that at this time there is no evidence for a more malignant underlying process, but also understands that early in the process of an illness or injury, and emergency department work-up can be falsely reassuring. Routine discharge counseling was given, and the patient understands that worsening, changing or persistent symptoms should prompt a immediate call or follow-up with their primary care physician or return to the emergency department. The importance of appropriate follow-up was also discussed. I have reviewed the disposition diagnosis with the patient. I have answered their questions and given discharge instructions.   They voiced understanding of these instructions and did not have any further questions or complaints.                 Preeti Giraldo DO  Attending Emergency Physician           Preeti Giraldo DO  12/12/20 [id number]

## 2021-01-09 ENCOUNTER — HOSPITAL ENCOUNTER (EMERGENCY)
Age: 51
Discharge: HOME | End: 2021-01-09
Attending: EMERGENCY MEDICINE
Payer: MEDICARE

## 2021-01-09 VITALS
SYSTOLIC BLOOD PRESSURE: 133 MMHG | HEART RATE: 88 BPM | DIASTOLIC BLOOD PRESSURE: 68 MMHG | TEMPERATURE: 98.2 F | RESPIRATION RATE: 16 BRPM | OXYGEN SATURATION: 99 %

## 2021-01-09 DIAGNOSIS — K08.89 TOOTHACHE: Primary | ICD-10-CM

## 2021-01-09 PROCEDURE — 99281 EMR DPT VST MAYX REQ PHY/QHP: CPT

## 2021-01-09 RX ORDER — OXYCODONE HYDROCHLORIDE AND ACETAMINOPHEN 5; 325 MG/1; MG/1
1 TABLET ORAL EVERY 8 HOURS PRN
Qty: 6 TABLET | Refills: 0 | Status: SHIPPED | OUTPATIENT
Start: 2021-01-09 | End: 2021-01-11

## 2021-01-09 ASSESSMENT — ENCOUNTER SYMPTOMS
NAUSEA: 0
COUGH: 0
WHEEZING: 0
SORE THROAT: 0
DIARRHEA: 0
SINUS PAIN: 0
FACIAL SWELLING: 0
VOICE CHANGE: 0
TROUBLE SWALLOWING: 0
SHORTNESS OF BREATH: 0
COLOR CHANGE: 0
CONSTIPATION: 0
SINUS PRESSURE: 0
VOMITING: 0
GASTROINTESTINAL NEGATIVE: 1
EYES NEGATIVE: 1

## 2021-01-09 NOTE — ED PROVIDER NOTES
[facility]  ED  EMERGENCY DEPARTMENT ENCOUNTER  RESIDENT    Pt Name: Jerilyn Joyner  MRN: [id number]  [place] 1970  Date of evaluation: 1/9/2021  PCP:  No primary care provider on file. CHIEF COMPLAINT       Chief Complaint   Patient presents with    Dental Pain     HISTORY OF PRESENT ILLNESS    Jerilyn Joyner is a 45 y.o. female who presents with a right lower toothache #29    HPI  Location/Symptom: Tooth #29  Timing/Onset: Approx 1 month ago   Context/Setting: Patient reports a history of poor dentition and poor dental care. She lost all of her upper teeth in the past. Patient had been evaluated in the ER last month for this issue on 12/12 and had been discharged with antibiotics and also provided with pain control. She has a dental appointment on 1/11/2021 for extraction. She notes that her BP has been under good control and does not anticipate this to be an issue. Pt is normotensive today. She denies any fever, cough, sore throat, chest pain/Shortness of breath, n/v. No throat swelling, lumps or any other abnormal findings. She has noticed some swelling in the lower jaw over the past month and there is no fluctuance or pus that has been expressed. She has tenderness at the site. Quality: sharp, intermittent pain  Duration: ongoing, chronic constant. Modifying Factors: Patient has tried tylenol around the clock and Orajel with minimal relief of her symptoms. Severity: severe at times, rated 10/10, worse with eating solids but tolerates solids and liquids well. REVIEW OF SYSTEMS       Review of Systems   Constitutional: Negative for chills, fatigue and fever. HENT: Positive for dental problem. Negative for congestion, drooling, ear discharge, ear pain, facial swelling, sinus pressure, sinus pain, sneezing, sore throat, tinnitus, trouble swallowing and voice change. Eyes: Negative. Respiratory: Negative for cough, shortness of breath and wheezing.     Cardiovascular: Negative for chest pain, palpitations and leg swelling. Gastrointestinal: Negative. Negative for constipation, diarrhea, nausea and vomiting. Genitourinary: Negative. Negative for difficulty urinating. Musculoskeletal: Negative. Skin: Negative. Negative for color change, pallor, rash and wound. Neurological: Negative for dizziness, facial asymmetry, light-headedness, numbness and headaches. Psychiatric/Behavioral: Negative. PAST MEDICAL HISTORY    has a past medical history of Anxiety, Bipolar disorder ([facility] [address] Brain aneurysm, Depression, Epilepsy ([facility] [address] Essential hypertension, Gastroesophageal reflux disease without esophagitis, Headache, Hyperlipidemia, Liver disease, Manic depression ([facility] [address] Pancreatitis, Scoliosis, and Seasonal allergies. SURGICAL HISTORY      has a past surgical history that includes hernia repair; Tonsillectomy; other surgical history (09/22/2015); Hysterectomy; hc picc power 5f triple (9/23/2015); and brain surgery. CURRENT MEDICATIONS       Previous Medications    ACETAMINOPHEN (APAP EXTRA STRENGTH) 500 MG TABLET    Take 1 tablet by mouth every 6 hours as needed for Pain    ALBUTEROL SULFATE  (90 BASE) MCG/ACT INHALER    INHALE 2 PUFFS INTO THE LUNGS EVERY 6 HOURS AS NEEDED FOR WHEEZING    ALCOHOL SWABS (ALCOHOL PADS) 70 % PADS    1 Units by Does not apply route daily    ATORVASTATIN (LIPITOR) 40 MG TABLET    TAKE 1 TABLET BY MOUTH DAILY    BLOOD GLUCOSE MONITOR STRIPS    1 each by In Vitro route daily As needed.     BLOOD PRESSURE MONITORING (BLOOD PRESSURE KIT) JOE    Check BP daily    DOCUSATE SODIUM (COLACE) 100 MG CAPSULE    Take 100 mg by mouth 2 times daily    DULOXETINE (CYMBALTA) 60 MG EXTENDED RELEASE CAPSULE    Take 60 mg by mouth 2 times daily    GLUCOSE MONITORING KIT (FREESTYLE) MONITORING KIT    1 kit by Does not apply route Once in dialysis for 1 dose    HYDROXYZINE (VISTARIL) 25 MG CAPSULE    Take 25 mg by mouth 3 times daily     IBUPROFEN (ADVIL;MOTRIN) 800 MG TABLET    Take 1 tablet by mouth every 8 hours as needed for Pain    LEVOMILNACIPRAN (FETZIMA) 40 MG CP24 EXTENDED RELEASE CAPSULE    Take 40 mg by mouth    LISINOPRIL (PRINIVIL;ZESTRIL) 5 MG TABLET    TAKE 1 TABLET BY MOUTH DAILY    METFORMIN (GLUCOPHAGE) 500 MG TABLET    500 mg PO daily for 1 week Then 500 mg PO twice daily    OMEPRAZOLE (PRILOSEC) 20 MG DELAYED RELEASE CAPSULE    TAKE 1 CAPSULE BY MOUTH DAILY    PHARMACIST CHOICE LANCETS MISC    USE ONCE A DAY    PROMETHAZINE (PHENERGAN) 25 MG TABLET    Take 12.5-25 mg by mouth    QUETIAPINE (SEROQUEL XR) 200 MG EXTENDED RELEASE TABLET    Take 200 mg by mouth daily    SODIUM CHLORIDE (OCEAN) 0.65 % NASAL SPRAY    1 spray by Nasal route as needed for Congestion    VITAMIN D (ERGOCALCIFEROL) 77428 UNITS CAPS CAPSULE    TAKE 1 CAPSULE BY MOUTH 1 TIME A WEEK FOR 8 DOSES       ALLERGIES     is allergic to benadryl [diphenhydramine]; buspar [buspirone]; codeine; flexeril [cyclobenzaprine]; ibuprofen; lamotrigine; paxil [paroxetine hcl]; pcn [penicillins]; simvastatin; and zoloft [sertraline hcl]. FAMILY HISTORY     She indicated that her mother is alive. She indicated that her father is . She indicated that the status of her sister is unknown.     family history includes Emphysema in her mother; Lupus in her sister; Seizures in her sister. SOCIAL HISTORY      reports that she has been smoking cigarettes. She has been smoking about 0.50 packs per day. She has never used smokeless tobacco. She reports current alcohol use of about 3.0 standard drinks of alcohol per week. She reports that she does not use drugs. PHYSICAL EXAM     INITIAL VITALS:  temporal temperature is 98.2 °F (36.8 °C). Her blood pressure is 133/68 and her pulse is 88. Her respiration is 16 and oxygen saturation is 99%. Physical Exam  Vitals signs and nursing note reviewed. Constitutional:       General: She is not in acute distress.      Appearance: Normal appearance. She is well-developed. She is not diaphoretic. HENT:      Head: Normocephalic and atraumatic. Comments: No upper teeth are present. Upper and Lower Gums appears to be intact. Mouth/Throat:      Lips: Pink. No lesions. Mouth: Mucous membranes are dry. No lacerations or oral lesions. Dentition: Abnormal dentition. Does not have dentures. Dental tenderness, gingival swelling and dental caries present. No dental abscesses or gum lesions. Tongue: No lesions. Tongue does not deviate from midline. Palate: No mass and lesions. Pharynx: Oropharynx is clear. Uvula midline. No pharyngeal swelling, oropharyngeal exudate, posterior oropharyngeal erythema or uvula swelling. Tonsils: No tonsillar exudate or tonsillar abscesses. Comments: Tooth affected, appears to be cracked. Eyes:      General:         Right eye: No discharge. Left eye: No discharge. Conjunctiva/sclera: Conjunctivae normal.      Pupils: Pupils are equal, round, and reactive to light. Neck:      Musculoskeletal: Normal range of motion and neck supple. Thyroid: No thyromegaly. Trachea: No tracheal deviation. Cardiovascular:      Rate and Rhythm: Normal rate and regular rhythm. Heart sounds: Normal heart sounds. No murmur. Pulmonary:      Effort: Pulmonary effort is normal. No respiratory distress. Breath sounds: Normal breath sounds. No wheezing. Abdominal:      General: Bowel sounds are normal. There is no distension. Palpations: Abdomen is soft. Tenderness: There is no abdominal tenderness. Musculoskeletal: Normal range of motion. Skin:     General: Skin is warm and dry. Neurological:      Mental Status: She is alert and oriented to person, place, and time. DIFFERENTIAL DIAGNOSIS/MDM:     Patient presents with a dental pain likely secondary to a cracked tooth and/or dental deepthi that requires dental intervention.  Patient is tolerating PO intake well at this time without any surrounding erythema, fluctuance or signs of abscess/Byrant Angina. We will not obtain advanced imaging of the head/neck at this time unless if there are any changes in her signs or symptoms should be present back to the ED. Patient has her antibiotics/prescription -Clindamycin with her today and has been taking it over the past 2 days. She has a follow-up scheduled for Monday. Patient will use Ice, Heat, Tylenol PRN, Orajel and also will be provided with 6 Percocets to be used on a PRN basis for her dental pain. Patient has tolerated taking Percocets in the past without any issues/side-effects even though codeine has been listed as an allergy. Risks, benefits and alternatives to narcotics were discussed with the patient and she agrees to use as little as possible. All questions were answered. Teach back method was used. DIAGNOSTIC RESULTS     EKG: All EKG's are interpreted by the Emergency Department Physician who either signs or Co-signs this chart in the absence of a cardiologist.    RADIOLOGY:   I directly visualized the following  images and reviewed the radiologist interpretations:  No orders to display       LABS:  Labs Reviewed - No data to display    EMERGENCY DEPARTMENT COURSE:   Vitals:    Vitals:    01/09/21 1731 01/09/21 1803   BP:  133/68   Pulse:  88   Resp:  16   Temp: 98.2 °F (36.8 °C)    TempSrc: Temporal    SpO2:  99%     PROCEDURES:  Procedures    FINAL IMPRESSION      1. Toothache          DISPOSITION/PLAN   DISPOSITION Discharge - Pending Orders Complete 01/09/2021 05:57:44 PM    Home with Dentist follow-up, return to ED if there are any concerns and PCP follow-up.     PATIENT REFERRED TO:  [facility]  [address] [id number] [phone]  Go to   As needed    [address] [address] [phone]  Schedule an appointment as soon as possible for a visit   Establish Care with a PCP if you do not have one      DISCHARGE MEDICATIONS:  New Prescriptions    BENZOCAINE (ORAJEL) 20 % GEL MUCOSAL GEL    Take 1 each by mouth 2 times daily as needed for Pain    OXYCODONE-ACETAMINOPHEN (PERCOCET) 5-325 MG PER TABLET    Take 1 tablet by mouth every 8 hours as needed for Pain for up to 2 days. Intended supply: 3 days.  Take lowest dose possible to manage pain       (Please note that portions of this note were completed with a voice recognition program.  Efforts were made to edit the dictations but occasionally words are mis-transcribed.)    [id number] Resident             Sandro Krishnan MD  Resident  01/09/21 [id number]       Sandro Krishnan MD  Resident  01/09/21 Bao Krishnan MD  Resident  01/09/21 2017

## 2021-01-09 NOTE — ED TRIAGE NOTES
Pt to ED with c/o of dental pain. Pt stated she is having pain on the bottom right side. Pt has been taking motrin without relief. Pt resting on stretcher. NAD noted. Denies other concerns. Call light in reach.

## 2021-01-09 NOTE — ED PROVIDER NOTES
[facility]     Emergency Department     Faculty Note/ Attestation      Pt Name: Tenzin Espana                                       MRN: [id number]  [place] 1970  Date of evaluation: 1/9/2021    Patients PCP:    No primary care provider on file. Attestation  I performed a history and physical examination of the patient and discussed management with the resident. I reviewed the residents note and agree with the documented findings and plan of care. Any areas of disagreement are noted on the chart. I was personally present for the key portions of any procedures. I have documented in the chart those procedures where I was not present during the key portions. I have reviewed the emergency nurses triage note. I agree with the chief complaint, past medical history, past surgical history, allergies, medications, social and family history as documented unless otherwise noted below. For Physician Assistant/ Nurse Practitioner cases/documentation I have personally evaluated this patient and have completed at least one if not all key elements of the E/M (history, physical exam, and MDM). Additional findings are as noted. Initial Screens:             Vitals:    Vitals:    01/09/21 1731   Temp: 98.2 °F (36.8 °C)   TempSrc: Temporal       CHIEF COMPLAINT     No chief complaint on file. DIAGNOSTIC RESULTS             RADIOLOGY:   No orders to display         LABS:  Labs Reviewed - No data to display      EMERGENCY DEPARTMENT COURSE:     -------------------------   , Temp: 98.2 °F (36.8 °C),  ,        Comments    Here 1 month ago with same concerns, sent to dental, BP was high so unable to get definitive care  sched for dental again Monday    Bottom right tooth pain  Has tried OTC remedies without success    Patient is on Clinda right now, she has no trouble swallowing or breathing, her only concern today is pain. There is no drainable fluid collection.     Plan for pain

## 2021-07-08 ENCOUNTER — HOSPITAL ENCOUNTER (EMERGENCY)
Age: 51
Discharge: HOME | End: 2021-07-09
Attending: EMERGENCY MEDICINE
Payer: MEDICARE

## 2021-07-08 ENCOUNTER — APPOINTMENT (OUTPATIENT)
Dept: GENERAL RADIOLOGY | Age: 51
End: 2021-07-08
Payer: MEDICARE

## 2021-07-08 VITALS
DIASTOLIC BLOOD PRESSURE: 53 MMHG | WEIGHT: 133 LBS | OXYGEN SATURATION: 95 % | HEART RATE: 94 BPM | SYSTOLIC BLOOD PRESSURE: 100 MMHG | BODY MASS INDEX: 22.71 KG/M2 | RESPIRATION RATE: 18 BRPM | HEIGHT: 64 IN | TEMPERATURE: 98 F

## 2021-07-08 DIAGNOSIS — S92.351A CLOSED FRACTURE OF BASE OF FIFTH METATARSAL BONE OF RIGHT FOOT, INITIAL ENCOUNTER: ICD-10-CM

## 2021-07-08 DIAGNOSIS — M79.671 RIGHT FOOT PAIN: Primary | ICD-10-CM

## 2021-07-08 PROCEDURE — 99285 EMERGENCY DEPT VISIT HI MDM: CPT

## 2021-07-08 PROCEDURE — 6370000000 HC RX 637 (ALT 250 FOR IP): Performed by: STUDENT IN AN ORGANIZED HEALTH CARE EDUCATION/TRAINING PROGRAM

## 2021-07-08 PROCEDURE — 73630 X-RAY EXAM OF FOOT: CPT

## 2021-07-08 RX ORDER — ONDANSETRON 4 MG/1
4 TABLET, ORALLY DISINTEGRATING ORAL ONCE
Status: COMPLETED | OUTPATIENT
Start: 2021-07-08 | End: 2021-07-08

## 2021-07-08 RX ORDER — METHOCARBAMOL 750 MG/1
1500 TABLET, FILM COATED ORAL ONCE
Status: COMPLETED | OUTPATIENT
Start: 2021-07-08 | End: 2021-07-08

## 2021-07-08 RX ORDER — ACETAMINOPHEN 500 MG
500 TABLET ORAL 4 TIMES DAILY PRN
Qty: 40 TABLET | Refills: 0 | Status: SHIPPED | OUTPATIENT
Start: 2021-07-08 | End: 2022-04-05

## 2021-07-08 RX ADMIN — METHOCARBAMOL 1500 MG: 750 TABLET ORAL at 23:07

## 2021-07-08 RX ADMIN — ONDANSETRON 4 MG: 4 TABLET, ORALLY DISINTEGRATING ORAL at 23:07

## 2021-07-08 ASSESSMENT — ENCOUNTER SYMPTOMS
COUGH: 0
BACK PAIN: 0
SORE THROAT: 0
NAUSEA: 0
VOMITING: 0
SHORTNESS OF BREATH: 0
ABDOMINAL PAIN: 0

## 2021-07-08 ASSESSMENT — PAIN DESCRIPTION - DESCRIPTORS: DESCRIPTORS: ACHING

## 2021-07-08 ASSESSMENT — PAIN SCALES - GENERAL: PAINLEVEL_OUTOF10: 10

## 2021-07-08 ASSESSMENT — PAIN DESCRIPTION - LOCATION: LOCATION: TOE (COMMENT WHICH ONE)

## 2021-07-08 ASSESSMENT — PAIN DESCRIPTION - PAIN TYPE: TYPE: ACUTE PAIN

## 2021-07-08 ASSESSMENT — PAIN DESCRIPTION - ORIENTATION: ORIENTATION: RIGHT

## 2021-07-09 NOTE — ED TRIAGE NOTES
Pt into ER with c/c right 4th toe pain after falling on it while on a trampoline. Pt states she is not able to bear weight on right toes.

## 2021-07-09 NOTE — ED PROVIDER NOTES
[address]  Emergency Department Encounter  EmergencyMedicine Resident     Pt Name:Sonja Vilchis  MRN: [id number]  [place] 1970  Date of evaluation: 7/8/21  PCP:  Percy Payton MD    This patient was evaluated in the Emergency Department for symptoms described in the history of present illness. The patient was evaluated in the context of the global COVID-19 pandemic, which necessitated consideration that the patient might be at risk for infection with the SARS-CoV-2 virus that causes COVID-19. Institutional protocols and algorithms that pertain to the evaluation of patients at risk for COVID-19 are in a state of rapid change based on information released by regulatory bodies including the CDC and federal and state organizations. These policies and algorithms were followed during the patient's care in the ED. CHIEF COMPLAINT       Chief Complaint   Patient presents with    Foot Pain       HISTORY OF PRESENT ILLNESS  (Location/Symptom, Timing/Onset, Context/Setting, Quality, Duration, Modifying Factors, Severity.)      Augusta Estrada is a 45 y.o. female who presents with right foot pain from being on the trampoline and landing with her right foot inverted. Denies LOC, head injury or other injuries. Patient did admit to having multiple beers and has smoked marijuana. Patient states that she was not able to put weight on her foot. Otherwise has no complaints at current, denies chest pain, shortness of breath, headaches, lightheadedness, abdominal pain, nausea or vomiting. PAST MEDICAL / SURGICAL / SOCIAL / FAMILY HISTORY      has a past medical history of Anxiety, Bipolar disorder ([address] Brain aneurysm, Depression, Epilepsy ([address] Essential hypertension, Gastroesophageal reflux disease without esophagitis, Headache, Hyperlipidemia, Liver disease, Manic depression ([address] Pancreatitis, Scoliosis, and Seasonal allergies.      has a past surgical history that includes hernia repair; Tonsillectomy; other surgical history (2015); Hysterectomy; hc picc power 5f triple (2015); and brain surgery. Social History     Socioeconomic History    Marital status: Single     Spouse name: Not on file    Number of children: Not on file    Years of education: Not on file    Highest education level: Not on file   Occupational History    Not on file   Tobacco Use    Smoking status: Current Every Day Smoker     Packs/day: 0.50     Types: Cigarettes     Last attempt to quit: 2015     Years since quittin.8    Smokeless tobacco: Never Used   Substance and Sexual Activity    Alcohol use: Yes     Alcohol/week: 3.0 standard drinks     Types: 3 Cans of beer per week     Comment: 3 cans per week     Drug use: Yes     Types: Marijuana    Sexual activity: Never   Other Topics Concern    Not on file   Social History Narrative    Not on file     Social Determinants of Health     Financial Resource Strain:     Difficulty of Paying Living Expenses:    Food Insecurity:     Worried About Running Out of Food in the Last Year:     Ran Out of Food in the Last Year:    Transportation Needs:     Lack of Transportation (Medical):      Lack of Transportation (Non-Medical):    Physical Activity:     Days of Exercise per Week:     Minutes of Exercise per Session:    Stress:     Feeling of Stress :    Social Connections:     Frequency of Communication with Friends and Family:     Frequency of Social Gatherings with Friends and Family:     Attends Jainism Services:     Active Member of Clubs or Organizations:     Attends Club or Organization Meetings:     Marital Status:    Intimate Partner Violence:     Fear of Current or Ex-Partner:     Emotionally Abused:     Physically Abused:     Sexually Abused:        Family History   Problem Relation Age of Onset    Emphysema Mother     Seizures Sister     Lupus Sister        Allergies:  Benadryl [diphenhydramine], Buspar [buspirone], Codeine, Flexeril [cyclobenzaprine], Ibuprofen, Lamotrigine, Paxil [paroxetine hcl], Pcn [penicillins], Simvastatin, and Zoloft [sertraline hcl]    Home Medications:  Prior to Admission medications    Medication Sig Start Date End Date Taking? Authorizing Provider   acetaminophen (TYLENOL) 500 MG tablet Take 1 tablet by mouth 4 times daily as needed for Pain 7/8/21 7/18/21 Yes Noah Boyce MD   ibuprofen (ADVIL;MOTRIN) 800 MG tablet Take 1 tablet by mouth every 8 hours as needed for Pain 12/12/20   Winston Valencia DO   lisinopril (PRINIVIL;ZESTRIL) 5 MG tablet TAKE 1 TABLET BY MOUTH DAILY 6/3/20   Savanah Parra MD   omeprazole (PRILOSEC) 20 MG delayed release capsule TAKE 1 CAPSULE BY MOUTH DAILY 5/13/20   James Prescott MD   Pharmacist Choice Lancets MISC USE ONCE A DAY 5/13/20   Savanah Parra MD   promethazine (PHENERGAN) 25 MG tablet Take 12.5-25 mg by mouth 11/24/18   Historical Provider, MD   levomilnacipran (FETZIMA) 40 MG CP24 extended release capsule Take 40 mg by mouth    Historical Provider, MD   albuterol sulfate  (90 Base) MCG/ACT inhaler INHALE 2 PUFFS INTO THE LUNGS EVERY 6 HOURS AS NEEDED FOR WHEEZING 1/15/20   Felicia Gillespie MD   blood glucose monitor strips 1 each by In Vitro route daily As needed.  7/3/19   Felicia Gillespie MD   vitamin D (ERGOCALCIFEROL) 79578 units CAPS capsule TAKE 1 CAPSULE BY MOUTH 1 TIME A WEEK FOR 8 DOSES 4/9/19   Felicia Gillespie MD   sodium chloride (OCEAN) 0.65 % nasal spray 1 spray by Nasal route as needed for Congestion 1/31/19   Felicia Gillespie MD   atorvastatin (LIPITOR) 40 MG tablet TAKE 1 TABLET BY MOUTH DAILY  Patient not taking: Reported on 1/27/2020 1/31/19   Felicia Gillespie MD   glucose monitoring kit (FREESTYLE) monitoring kit 1 kit by Does not apply route Once in dialysis for 1 dose 11/6/18 11/6/18  Luis Miguel Rosenthal MD   Alcohol Swabs (ALCOHOL PADS) 70 % PADS 1 Units by Does not apply route daily 11/6/18   Luis Miguel Rosenthal MD metFORMIN (GLUCOPHAGE) 500 MG tablet 500 mg PO daily for 1 week Then 500 mg PO twice daily 7/24/18   Demond Ahuja MD   Blood Pressure Monitoring (BLOOD PRESSURE KIT) JOE Check BP daily 4/30/18   Rafael Cm MD   DULoxetine (CYMBALTA) 60 MG extended release capsule Take 60 mg by mouth 2 times daily 1/18/18   Historical Provider, MD   QUEtiapine (SEROQUEL XR) 200 MG extended release tablet Take 200 mg by mouth daily 1/18/18   Historical Provider, MD   docusate sodium (COLACE) 100 MG capsule Take 100 mg by mouth 2 times daily    Historical Provider, MD   hydrOXYzine (VISTARIL) 25 MG capsule Take 25 mg by mouth 3 times daily     Historical Provider, MD       REVIEW OF SYSTEMS    (2-9 systems for level 4, 10 or more for level 5)      Review of Systems   Constitutional: Negative for chills and fever. HENT: Negative for sore throat. Eyes: Negative for visual disturbance. Respiratory: Negative for cough and shortness of breath. Cardiovascular: Negative for chest pain and palpitations. Gastrointestinal: Negative for abdominal pain, nausea and vomiting. Endocrine: Negative for polyuria. Genitourinary: Negative for dysuria and hematuria. Musculoskeletal: Negative for back pain. Skin: Negative for pallor and wound. Allergic/Immunologic: Negative for food allergies. Neurological: Negative for light-headedness and headaches. Psychiatric/Behavioral: Negative for confusion. PHYSICAL EXAM   (up to 7 for level 4, 8 or more for level 5)      INITIAL VITALS:   BP (!) 100/53   Pulse 94   Temp 98 °F (36.7 °C) (Oral)   Resp 18   Ht 5' 4\" (1.626 m)   Wt 133 lb (60.3 kg)   SpO2 95%   BMI 22.83 kg/m²     Physical Exam  Constitutional:       General: She is not in acute distress. Appearance: Normal appearance. She is normal weight. HENT:      Head: Normocephalic and atraumatic.       Nose: Nose normal.      Mouth/Throat:      Mouth: Mucous membranes are moist.      Pharynx: Oropharynx is clear. Eyes:      Extraocular Movements: Extraocular movements intact. Pupils: Pupils are equal, round, and reactive to light. Cardiovascular:      Rate and Rhythm: Normal rate and regular rhythm. Pulses: Normal pulses. Heart sounds: Normal heart sounds. Pulmonary:      Effort: Pulmonary effort is normal.      Breath sounds: Normal breath sounds. Abdominal:      Palpations: Abdomen is soft. Tenderness: There is no abdominal tenderness. There is no right CVA tenderness or left CVA tenderness. Musculoskeletal:      Cervical back: Neck supple. No tenderness. Comments: Tenderness to palpation to right fifth MT base and right fifth digit of foot, swelling present  Distal pulses intact   Skin:     General: Skin is warm. Capillary Refill: Capillary refill takes less than 2 seconds. Neurological:      General: No focal deficit present. Mental Status: She is alert and oriented to person, place, and time. Mental status is at baseline. Psychiatric:         Mood and Affect: Mood normal.       DIFFERENTIAL  DIAGNOSIS     PLAN (LABS / IMAGING / EKG):  Orders Placed This Encounter   Procedures    XR FOOT RIGHT (MIN 3 VIEWS)    Apply ice to affected area   [address], [employer] Hi Top, Right; MD (M7-10/F8-11)       MEDICATIONS ORDERED:  Orders Placed This Encounter   Medications    methocarbamol (ROBAXIN) tablet 1,500 mg    ondansetron (ZOFRAN-ODT) disintegrating tablet 4 mg    acetaminophen (TYLENOL) 500 MG tablet     Sig: Take 1 tablet by mouth 4 times daily as needed for Pain     Dispense:  40 tablet     Refill:  0       DDX: At this time concerning for fracture or sprain, x-ray imaging obtained. Will give patient pain control and ice to affected area    DIAGNOSTIC RESULTS / [facility]   LAB RESULTS:  No results found for this visit on 07/08/21.     IMPRESSION: 67-year-old lady presents to the emergency department with right foot pain and swelling after twisting her ankle on a trampoline. Motrin, ice given for pain control. X-ray showing a fracture at the fifth metatarsal.  At this time patient has a fracture boot in place, discussed with patient regarding follow-up with orthopedic surgeon/podiatry, primary care doctor and for strict return precautions. Patient verbalized agreement and understanding, stable for discharge at this time. RADIOLOGY:  See radiology report    EMERGENCY DEPARTMENT COURSE:  ED Course as of Jul 09 0042   Thu Jul 08, 2021   2306 XR  Suspected subtle cortical nondisplaced acute avulsion fracture involving the  proximal 5th metatarsal epiphysis. Recommend clinical correlation.    [EM]      ED Course User Index  [EM] Eliecer Oliva MD        PROCEDURES:  None    CONSULTS:  None    FINAL IMPRESSION      1.  Right foot pain    2. Closed fracture of base of fifth metatarsal bone of right foot, initial encounter          DISPOSITION / PLAN     DISPOSITION        PATIENT REFERRED TO:  Marciano Rob MD  [address]  [place] [phone]    Schedule an appointment as soon as possible for a visit   For follow up    [facility] ED  [place] [id number]  [phone]  Go to   As needed    Rocio Moses MD  [address]  [phone]    Schedule an appointment as soon as possible for a visit   For follow up      DISCHARGE MEDICATIONS:  Discharge Medication List as of 7/8/2021 11:51 PM          Eliecer Oliva MD  Emergency Medicine Resident    (Please note that portions of thisnote were completed with a voice recognition program.  Efforts were made to edit the dictations but occasionally words are mis-transcribed.)       Eliecer Oliva MD  Resident  07/09/21 1164

## 2021-07-09 NOTE — ED PROVIDER NOTES
[address] ED  eMERGENCY dEPARTMENT eNCOUnter   Attending Attestation     Pt Name: Glenna Felton  MRN: [id number]  [place] 1970  Date of evaluation: 7/8/21       Glenna Felton is a 45 y.o. female who presents with Foot Pain      History:   Patient was jumping on the trampoline and bent her toes back and has been having pain in her foot since. Exam: Vitals:   Vitals:    07/08/21 2208   BP: (!) 100/53   Pulse: 94   Resp: 18   Temp: 98 °F (36.7 °C)   TempSrc: Oral   SpO2: 95%   Weight: 133 lb (60.3 kg)   Height: 5' 4\" (1.626 m)     Right foot shows tenderness palpation of the distal foot no swelling or ecchymosis. I performed a history and physical examination of the patient and discussed management with the resident. I reviewed the residents note and agree with the documented findings and plan of care. Any areas of disagreement are noted on the chart. I was personally present for the key portions of any procedures. I have documented in the chart those procedures where I was not present during the key portions. I have personally reviewed all images and agree with the resident's interpretation. I have reviewed the emergency nurses triage note. I agree with the chief complaint, past medical history, past surgical history, allergies, medications, social and family history as documented unless otherwise noted below. Documentation of the HPI, Physical Exam and Medical Decision Making performed by medical students or scribes is based on my personal performance of the HPI, PE and MDM. I personally evaluated and examined the patient in conjunction with the APC and agree with the assessment, treatment plan, and disposition of the patient as recorded by the APC. Additional findings are as noted.     Peyton Nguyen MD  Attending Emergency  Physician             Anu Menendez MD  07/08/21 [id number]

## 2021-08-07 ENCOUNTER — HOSPITAL ENCOUNTER (INPATIENT)
Age: 51
LOS: 1 days | Discharge: HOME | DRG: 812 | End: 2021-08-08
Attending: EMERGENCY MEDICINE | Admitting: STUDENT IN AN ORGANIZED HEALTH CARE EDUCATION/TRAINING PROGRAM
Payer: MEDICARE

## 2021-08-07 ENCOUNTER — APPOINTMENT (OUTPATIENT)
Dept: GENERAL RADIOLOGY | Age: 51
DRG: 812 | End: 2021-08-07
Payer: MEDICARE

## 2021-08-07 ENCOUNTER — APPOINTMENT (OUTPATIENT)
Dept: CT IMAGING | Age: 51
DRG: 812 | End: 2021-08-07
Payer: MEDICARE

## 2021-08-07 DIAGNOSIS — T58.91XA ACCIDENTAL POISONING BY CARBON MONOXIDE, INITIAL ENCOUNTER: Primary | ICD-10-CM

## 2021-08-07 DIAGNOSIS — J18.9 PNEUMONIA DUE TO INFECTIOUS ORGANISM, UNSPECIFIED LATERALITY, UNSPECIFIED PART OF LUNG: ICD-10-CM

## 2021-08-07 LAB
ABSOLUTE EOS #: 0.1 K/UL (ref 0–0.4)
ABSOLUTE IMMATURE GRANULOCYTE: ABNORMAL K/UL (ref 0–0.3)
ABSOLUTE LYMPH #: 2.2 K/UL (ref 1–4.8)
ABSOLUTE MONO #: 0.3 K/UL (ref 0.1–1.3)
ALLEN TEST: ABNORMAL
ALLEN TEST: ABNORMAL
ANION GAP SERPL CALCULATED.3IONS-SCNC: 20 MMOL/L (ref 9–17)
BASOPHILS # BLD: 1 % (ref 0–2)
BASOPHILS ABSOLUTE: 0 K/UL (ref 0–0.2)
BUN BLDV-MCNC: 7 MG/DL (ref 6–20)
BUN/CREAT BLD: ABNORMAL (ref 9–20)
CALCIUM SERPL-MCNC: 9 MG/DL (ref 8.6–10.4)
CARBOXYHEMOGLOBIN: 11.9 % (ref 0–5)
CARBOXYHEMOGLOBIN: 3.5 % (ref 0–5)
CHLORIDE BLD-SCNC: 90 MMOL/L (ref 98–107)
CO2: 22 MMOL/L (ref 20–31)
CREAT SERPL-MCNC: 0.5 MG/DL (ref 0.5–0.9)
DIFFERENTIAL TYPE: ABNORMAL
EOSINOPHILS RELATIVE PERCENT: 3 % (ref 0–4)
FIO2: ABNORMAL
FIO2: ABNORMAL
GFR AFRICAN AMERICAN: >60 ML/MIN
GFR NON-AFRICAN AMERICAN: >60 ML/MIN
GFR SERPL CREATININE-BSD FRML MDRD: ABNORMAL ML/MIN/{1.73_M2}
GFR SERPL CREATININE-BSD FRML MDRD: ABNORMAL ML/MIN/{1.73_M2}
GLUCOSE BLD-MCNC: 158 MG/DL (ref 70–99)
HCO3 VENOUS: 23.2 MMOL/L (ref 24–30)
HCO3 VENOUS: 26.5 MMOL/L (ref 24–30)
HCT VFR BLD CALC: 36.8 % (ref 36–46)
HEMOGLOBIN: 13 G/DL (ref 12–16)
IMMATURE GRANULOCYTES: ABNORMAL %
INR BLD: 1
LYMPHOCYTES # BLD: 39 % (ref 24–44)
MAGNESIUM: 2 MG/DL (ref 1.6–2.6)
MCH RBC QN AUTO: 32.7 PG (ref 26–34)
MCHC RBC AUTO-ENTMCNC: 35.4 G/DL (ref 31–37)
MCV RBC AUTO: 92.2 FL (ref 80–100)
METHEMOGLOBIN: 0.7 % (ref 0–1.9)
METHEMOGLOBIN: 0.9 % (ref 0–1.9)
MODE: ABNORMAL
MODE: ABNORMAL
MONOCYTES # BLD: 5 % (ref 1–7)
NEGATIVE BASE EXCESS, VEN: 1.6 MMOL/L (ref 0–2)
NEGATIVE BASE EXCESS, VEN: ABNORMAL MMOL/L (ref 0–2)
NOTIFICATION TIME: ABNORMAL
NOTIFICATION TIME: ABNORMAL
NOTIFICATION: ABNORMAL
NOTIFICATION: ABNORMAL
NRBC AUTOMATED: ABNORMAL PER 100 WBC
O2 DEVICE/FLOW/%: ABNORMAL
O2 DEVICE/FLOW/%: ABNORMAL
O2 SAT, VEN: 86.1 % (ref 60–85)
O2 SAT, VEN: 94.7 % (ref 60–85)
OXYHEMOGLOBIN: ABNORMAL % (ref 95–98)
OXYHEMOGLOBIN: ABNORMAL % (ref 95–98)
PARTIAL THROMBOPLASTIN TIME: 27.9 SEC (ref 24–36)
PATIENT TEMP: 37
PATIENT TEMP: 37
PCO2, VEN, TEMP ADJ: ABNORMAL MMHG (ref 39–55)
PCO2, VEN, TEMP ADJ: ABNORMAL MMHG (ref 39–55)
PCO2, VEN: 37.3 (ref 39–55)
PCO2, VEN: 48.4 (ref 39–55)
PDW BLD-RTO: 12.9 % (ref 11.5–14.9)
PEEP/CPAP: ABNORMAL
PEEP/CPAP: ABNORMAL
PH VENOUS: 7.35 (ref 7.32–7.42)
PH VENOUS: 7.4 (ref 7.32–7.42)
PH, VEN, TEMP ADJ: ABNORMAL (ref 7.32–7.42)
PH, VEN, TEMP ADJ: ABNORMAL (ref 7.32–7.42)
PLATELET # BLD: 216 K/UL (ref 150–450)
PLATELET ESTIMATE: ABNORMAL
PMV BLD AUTO: 8.1 FL (ref 6–12)
PO2, VEN, TEMP ADJ: ABNORMAL MMHG (ref 30–50)
PO2, VEN, TEMP ADJ: ABNORMAL MMHG (ref 30–50)
PO2, VEN: 100 (ref 30–50)
PO2, VEN: 157 (ref 30–50)
POSITIVE BASE EXCESS, VEN: 0.8 MMOL/L (ref 0–2)
POSITIVE BASE EXCESS, VEN: ABNORMAL MMOL/L (ref 0–2)
POTASSIUM SERPL-SCNC: 3.2 MMOL/L (ref 3.7–5.3)
POTASSIUM SERPL-SCNC: 5 MMOL/L (ref 3.7–5.3)
PROTHROMBIN TIME: 12.8 SEC (ref 11.8–14.6)
PSV: ABNORMAL
PSV: ABNORMAL
PT. POSITION: ABNORMAL
PT. POSITION: ABNORMAL
RBC # BLD: 3.99 M/UL (ref 4–5.2)
RBC # BLD: ABNORMAL 10*6/UL
RESPIRATORY RATE: ABNORMAL
RESPIRATORY RATE: ABNORMAL
SAMPLE SITE: ABNORMAL
SAMPLE SITE: ABNORMAL
SEG NEUTROPHILS: 52 % (ref 36–66)
SEGMENTED NEUTROPHILS ABSOLUTE COUNT: 3 K/UL (ref 1.3–9.1)
SET RATE: ABNORMAL
SET RATE: ABNORMAL
SODIUM BLD-SCNC: 132 MMOL/L (ref 135–144)
TEXT FOR RESPIRATORY: ABNORMAL
TEXT FOR RESPIRATORY: ABNORMAL
TOTAL HB: ABNORMAL G/DL (ref 12–16)
TOTAL HB: ABNORMAL G/DL (ref 12–16)
TOTAL RATE: ABNORMAL
TOTAL RATE: ABNORMAL
TROPONIN INTERP: NORMAL
TROPONIN T: NORMAL NG/ML
TROPONIN, HIGH SENSITIVITY: <6 NG/L (ref 0–14)
VT: ABNORMAL
VT: ABNORMAL
WBC # BLD: 5.7 K/UL (ref 3.5–11)
WBC # BLD: ABNORMAL 10*3/UL

## 2021-08-07 PROCEDURE — 1200000000 HC SEMI PRIVATE

## 2021-08-07 PROCEDURE — 2500000003 HC RX 250 WO HCPCS: Performed by: STUDENT IN AN ORGANIZED HEALTH CARE EDUCATION/TRAINING PROGRAM

## 2021-08-07 PROCEDURE — 99285 EMERGENCY DEPT VISIT HI MDM: CPT

## 2021-08-07 PROCEDURE — 6360000002 HC RX W HCPCS: Performed by: STUDENT IN AN ORGANIZED HEALTH CARE EDUCATION/TRAINING PROGRAM

## 2021-08-07 PROCEDURE — 6360000002 HC RX W HCPCS: Performed by: EMERGENCY MEDICINE

## 2021-08-07 PROCEDURE — 6370000000 HC RX 637 (ALT 250 FOR IP): Performed by: STUDENT IN AN ORGANIZED HEALTH CARE EDUCATION/TRAINING PROGRAM

## 2021-08-07 PROCEDURE — 80048 BASIC METABOLIC PNL TOTAL CA: CPT

## 2021-08-07 PROCEDURE — 2580000003 HC RX 258: Performed by: STUDENT IN AN ORGANIZED HEALTH CARE EDUCATION/TRAINING PROGRAM

## 2021-08-07 PROCEDURE — 85730 THROMBOPLASTIN TIME PARTIAL: CPT

## 2021-08-07 PROCEDURE — 84132 ASSAY OF SERUM POTASSIUM: CPT

## 2021-08-07 PROCEDURE — 2580000003 HC RX 258: Performed by: EMERGENCY MEDICINE

## 2021-08-07 PROCEDURE — 82375 ASSAY CARBOXYHB QUANT: CPT

## 2021-08-07 PROCEDURE — 6360000002 HC RX W HCPCS: Performed by: INTERNAL MEDICINE

## 2021-08-07 PROCEDURE — 84484 ASSAY OF TROPONIN QUANT: CPT

## 2021-08-07 PROCEDURE — 71045 X-RAY EXAM CHEST 1 VIEW: CPT

## 2021-08-07 PROCEDURE — 6370000000 HC RX 637 (ALT 250 FOR IP)

## 2021-08-07 PROCEDURE — 94761 N-INVAS EAR/PLS OXIMETRY MLT: CPT

## 2021-08-07 PROCEDURE — 85025 COMPLETE CBC W/AUTO DIFF WBC: CPT

## 2021-08-07 PROCEDURE — 6370000000 HC RX 637 (ALT 250 FOR IP): Performed by: INTERNAL MEDICINE

## 2021-08-07 PROCEDURE — 83735 ASSAY OF MAGNESIUM: CPT

## 2021-08-07 PROCEDURE — 2700000000 HC OXYGEN THERAPY PER DAY

## 2021-08-07 PROCEDURE — 70450 CT HEAD/BRAIN W/O DYE: CPT

## 2021-08-07 PROCEDURE — 93005 ELECTROCARDIOGRAM TRACING: CPT | Performed by: EMERGENCY MEDICINE

## 2021-08-07 PROCEDURE — 82805 BLOOD GASES W/O2 SATURATION: CPT

## 2021-08-07 PROCEDURE — 85610 PROTHROMBIN TIME: CPT

## 2021-08-07 PROCEDURE — 6370000000 HC RX 637 (ALT 250 FOR IP): Performed by: EMERGENCY MEDICINE

## 2021-08-07 PROCEDURE — 36415 COLL VENOUS BLD VENIPUNCTURE: CPT

## 2021-08-07 PROCEDURE — 94640 AIRWAY INHALATION TREATMENT: CPT

## 2021-08-07 RX ORDER — METHYLPREDNISOLONE SODIUM SUCCINATE 125 MG/2ML
60 INJECTION, POWDER, LYOPHILIZED, FOR SOLUTION INTRAMUSCULAR; INTRAVENOUS EVERY 8 HOURS
Status: DISCONTINUED | OUTPATIENT
Start: 2021-08-07 | End: 2021-08-07

## 2021-08-07 RX ORDER — HYDROXYZINE HYDROCHLORIDE 25 MG/1
25 TABLET, FILM COATED ORAL 3 TIMES DAILY
Status: DISCONTINUED | OUTPATIENT
Start: 2021-08-07 | End: 2021-08-08 | Stop reason: HOSPADM

## 2021-08-07 RX ORDER — ACETAMINOPHEN 325 MG/1
650 TABLET ORAL EVERY 4 HOURS PRN
Status: DISCONTINUED | OUTPATIENT
Start: 2021-08-07 | End: 2021-08-08 | Stop reason: HOSPADM

## 2021-08-07 RX ORDER — ALBUTEROL SULFATE 2.5 MG/3ML
2.5 SOLUTION RESPIRATORY (INHALATION)
Status: DISCONTINUED | OUTPATIENT
Start: 2021-08-07 | End: 2021-08-08 | Stop reason: HOSPADM

## 2021-08-07 RX ORDER — IPRATROPIUM BROMIDE AND ALBUTEROL SULFATE 2.5; .5 MG/3ML; MG/3ML
SOLUTION RESPIRATORY (INHALATION)
Status: COMPLETED
Start: 2021-08-07 | End: 2021-08-07

## 2021-08-07 RX ORDER — IPRATROPIUM BROMIDE AND ALBUTEROL SULFATE 2.5; .5 MG/3ML; MG/3ML
1 SOLUTION RESPIRATORY (INHALATION)
Status: DISCONTINUED | OUTPATIENT
Start: 2021-08-07 | End: 2021-08-07

## 2021-08-07 RX ORDER — SODIUM CHLORIDE 0.9 % (FLUSH) 0.9 %
5-40 SYRINGE (ML) INJECTION EVERY 12 HOURS SCHEDULED
Status: DISCONTINUED | OUTPATIENT
Start: 2021-08-07 | End: 2021-08-08 | Stop reason: HOSPADM

## 2021-08-07 RX ORDER — ONDANSETRON 4 MG/1
4 TABLET, ORALLY DISINTEGRATING ORAL EVERY 8 HOURS PRN
Status: DISCONTINUED | OUTPATIENT
Start: 2021-08-07 | End: 2021-08-08 | Stop reason: HOSPADM

## 2021-08-07 RX ORDER — DULOXETIN HYDROCHLORIDE 60 MG/1
60 CAPSULE, DELAYED RELEASE ORAL 2 TIMES DAILY
Status: DISCONTINUED | OUTPATIENT
Start: 2021-08-07 | End: 2021-08-08 | Stop reason: HOSPADM

## 2021-08-07 RX ORDER — SODIUM CHLORIDE 0.9 % (FLUSH) 0.9 %
5-40 SYRINGE (ML) INJECTION PRN
Status: DISCONTINUED | OUTPATIENT
Start: 2021-08-07 | End: 2021-08-08 | Stop reason: HOSPADM

## 2021-08-07 RX ORDER — SODIUM CHLORIDE 9 MG/ML
25 INJECTION, SOLUTION INTRAVENOUS PRN
Status: DISCONTINUED | OUTPATIENT
Start: 2021-08-07 | End: 2021-08-08 | Stop reason: HOSPADM

## 2021-08-07 RX ORDER — LISINOPRIL 20 MG/1
20 TABLET ORAL DAILY
Status: DISCONTINUED | OUTPATIENT
Start: 2021-08-07 | End: 2021-08-08 | Stop reason: HOSPADM

## 2021-08-07 RX ORDER — ONDANSETRON 2 MG/ML
4 INJECTION INTRAMUSCULAR; INTRAVENOUS EVERY 6 HOURS PRN
Status: DISCONTINUED | OUTPATIENT
Start: 2021-08-07 | End: 2021-08-08 | Stop reason: HOSPADM

## 2021-08-07 RX ORDER — METHYLPREDNISOLONE SODIUM SUCCINATE 40 MG/ML
40 INJECTION, POWDER, LYOPHILIZED, FOR SOLUTION INTRAMUSCULAR; INTRAVENOUS EVERY 8 HOURS
Status: DISCONTINUED | OUTPATIENT
Start: 2021-08-07 | End: 2021-08-08

## 2021-08-07 RX ORDER — IPRATROPIUM BROMIDE AND ALBUTEROL SULFATE 2.5; .5 MG/3ML; MG/3ML
2 SOLUTION RESPIRATORY (INHALATION) ONCE
Status: COMPLETED | OUTPATIENT
Start: 2021-08-07 | End: 2021-08-07

## 2021-08-07 RX ADMIN — LISINOPRIL 20 MG: 20 TABLET ORAL at 23:36

## 2021-08-07 RX ADMIN — METHYLPREDNISOLONE SODIUM SUCCINATE 60 MG: 125 INJECTION, POWDER, FOR SOLUTION INTRAMUSCULAR; INTRAVENOUS at 08:30

## 2021-08-07 RX ADMIN — IPRATROPIUM BROMIDE AND ALBUTEROL SULFATE 1 AMPULE: 2.5; .5 SOLUTION RESPIRATORY (INHALATION) at 08:04

## 2021-08-07 RX ADMIN — ACETAMINOPHEN 650 MG: 325 TABLET ORAL at 12:10

## 2021-08-07 RX ADMIN — AZITHROMYCIN MONOHYDRATE 500 MG: 500 INJECTION, POWDER, LYOPHILIZED, FOR SOLUTION INTRAVENOUS at 09:02

## 2021-08-07 RX ADMIN — IPRATROPIUM BROMIDE AND ALBUTEROL SULFATE 2 AMPULE: 2.5; .5 SOLUTION RESPIRATORY (INHALATION) at 01:00

## 2021-08-07 RX ADMIN — ACETAMINOPHEN 650 MG: 325 TABLET ORAL at 19:40

## 2021-08-07 RX ADMIN — ALBUTEROL SULFATE 2.5 MG: 2.5 SOLUTION RESPIRATORY (INHALATION) at 16:19

## 2021-08-07 RX ADMIN — IPRATROPIUM BROMIDE AND ALBUTEROL SULFATE 1 AMPULE: 2.5; .5 SOLUTION RESPIRATORY (INHALATION) at 12:02

## 2021-08-07 RX ADMIN — METHYLPREDNISOLONE SODIUM SUCCINATE 40 MG: 40 INJECTION, POWDER, FOR SOLUTION INTRAMUSCULAR; INTRAVENOUS at 17:12

## 2021-08-07 RX ADMIN — CEFTRIAXONE SODIUM 1000 MG: 1 INJECTION, POWDER, FOR SOLUTION INTRAMUSCULAR; INTRAVENOUS at 12:00

## 2021-08-07 RX ADMIN — ENOXAPARIN SODIUM 40 MG: 40 INJECTION SUBCUTANEOUS at 12:00

## 2021-08-07 RX ADMIN — SODIUM CHLORIDE, PRESERVATIVE FREE 10 ML: 5 INJECTION INTRAVENOUS at 21:12

## 2021-08-07 RX ADMIN — DULOXETINE HYDROCHLORIDE 60 MG: 60 CAPSULE, DELAYED RELEASE ORAL at 14:54

## 2021-08-07 RX ADMIN — Medication 10 ML: at 08:32

## 2021-08-07 RX ADMIN — POTASSIUM BICARBONATE 40 MEQ: 782 TABLET, EFFERVESCENT ORAL at 04:51

## 2021-08-07 RX ADMIN — METHYLPREDNISOLONE SODIUM SUCCINATE 40 MG: 40 INJECTION, POWDER, FOR SOLUTION INTRAMUSCULAR; INTRAVENOUS at 23:36

## 2021-08-07 RX ADMIN — ALBUTEROL SULFATE 2.5 MG: 2.5 SOLUTION RESPIRATORY (INHALATION) at 19:50

## 2021-08-07 RX ADMIN — DULOXETINE HYDROCHLORIDE 60 MG: 60 CAPSULE, DELAYED RELEASE ORAL at 21:11

## 2021-08-07 RX ADMIN — ONDANSETRON 4 MG: 2 INJECTION INTRAMUSCULAR; INTRAVENOUS at 21:12

## 2021-08-07 RX ADMIN — FAMOTIDINE 20 MG: 10 INJECTION, SOLUTION INTRAVENOUS at 21:11

## 2021-08-07 RX ADMIN — HYDROXYZINE HYDROCHLORIDE 25 MG: 25 TABLET, FILM COATED ORAL at 14:54

## 2021-08-07 RX ADMIN — HYDROXYZINE HYDROCHLORIDE 25 MG: 25 TABLET, FILM COATED ORAL at 21:11

## 2021-08-07 RX ADMIN — FAMOTIDINE 20 MG: 10 INJECTION, SOLUTION INTRAVENOUS at 14:54

## 2021-08-07 ASSESSMENT — ENCOUNTER SYMPTOMS
EYE PAIN: 0
ABDOMINAL PAIN: 0
COUGH: 1
COLOR CHANGE: 0
BACK PAIN: 0
SHORTNESS OF BREATH: 1

## 2021-08-07 ASSESSMENT — PAIN SCALES - GENERAL
PAINLEVEL_OUTOF10: 10
PAINLEVEL_OUTOF10: 5
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
## 2021-08-07 NOTE — PROGRESS NOTES
RN notified Dr. Sean Thompson of the consult. Case reviewed. New orders for prn albuterol treatments, stat cxr and transfer to ICU intermediate for closer monitoring. The patient and her sister were updated.

## 2021-08-07 NOTE — PROGRESS NOTES
When Dr. Fernie Hillman rounded he stated pt could transfer to a lower level of care if it was ok with pulmonary. Orders received to d/c lab orders as well.  See orders for details

## 2021-08-07 NOTE — ED NOTES
Expiratory wheezing noted to upper and mid posterior lobes. Patient resting in supine position on patient's left side. Patient appears to be in no distress with non-labored respirations noted. Patient does have a dry non-productive cough with audible wheezes noted with cough. Will update receiving nurse Jonathan Peacock RN) to coordinate transfer with breathing treatment.       Sunil Figueroa RN  08/07/21 [id number]

## 2021-08-07 NOTE — PLAN OF CARE
Problem: Falls - Risk of:  Goal: Will remain free from falls  Description: Will remain free from falls  Outcome: Met This Shift  Goal: Absence of physical injury  Description: Absence of physical injury  Outcome: Met This Shift     Problem:  Activity:  Goal: Ability to tolerate increased activity will improve  Description: Ability to tolerate increased activity will improve  Outcome: Met This Shift     Problem: Breathing Pattern - Ineffective:  Goal: Ability to achieve and maintain a regular respiratory rate will improve  Description: Ability to achieve and maintain a regular respiratory rate will improve  Outcome: Ongoing
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
## 2021-08-07 NOTE — ED NOTES
Pt transported to room 2105 by stretcher at 0732. Handoff given to [address] [place] [address] at 9641.      [facility]  08/07/21 [id number]

## 2021-08-07 NOTE — PROGRESS NOTES
RN called and notified Dr. Kiran Ventura, covering for Dr. Nikkie Bhardwaj that the patient was being transferred to ICU per Dr. Victor Manuel Herr. RN was also given orders to start a diet.
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
## 2021-08-07 NOTE — PROGRESS NOTES
RN called and notified Dr. Jacqueline Hensley that the patient had wheezes throughout, and was breathing about 24 bmp. New orders for IV solumedrol, resp treatments and pulm consult.

## 2021-08-08 VITALS
HEART RATE: 105 BPM | DIASTOLIC BLOOD PRESSURE: 60 MMHG | WEIGHT: 134.26 LBS | HEIGHT: 65 IN | OXYGEN SATURATION: 95 % | TEMPERATURE: 97.9 F | RESPIRATION RATE: 20 BRPM | BODY MASS INDEX: 22.37 KG/M2 | SYSTOLIC BLOOD PRESSURE: 96 MMHG

## 2021-08-08 PROCEDURE — 6360000002 HC RX W HCPCS: Performed by: INTERNAL MEDICINE

## 2021-08-08 PROCEDURE — 2500000003 HC RX 250 WO HCPCS: Performed by: STUDENT IN AN ORGANIZED HEALTH CARE EDUCATION/TRAINING PROGRAM

## 2021-08-08 PROCEDURE — 6370000000 HC RX 637 (ALT 250 FOR IP): Performed by: INTERNAL MEDICINE

## 2021-08-08 PROCEDURE — 6360000002 HC RX W HCPCS: Performed by: STUDENT IN AN ORGANIZED HEALTH CARE EDUCATION/TRAINING PROGRAM

## 2021-08-08 PROCEDURE — 6370000000 HC RX 637 (ALT 250 FOR IP): Performed by: STUDENT IN AN ORGANIZED HEALTH CARE EDUCATION/TRAINING PROGRAM

## 2021-08-08 PROCEDURE — 94761 N-INVAS EAR/PLS OXIMETRY MLT: CPT

## 2021-08-08 PROCEDURE — 94640 AIRWAY INHALATION TREATMENT: CPT

## 2021-08-08 PROCEDURE — 2580000003 HC RX 258: Performed by: STUDENT IN AN ORGANIZED HEALTH CARE EDUCATION/TRAINING PROGRAM

## 2021-08-08 RX ORDER — PREDNISONE 20 MG/1
40 TABLET ORAL DAILY
Status: DISCONTINUED | OUTPATIENT
Start: 2021-08-08 | End: 2021-08-08 | Stop reason: HOSPADM

## 2021-08-08 RX ORDER — PREDNISONE 20 MG/1
40 TABLET ORAL DAILY
Qty: 10 TABLET | Refills: 0 | Status: SHIPPED | OUTPATIENT
Start: 2021-08-08 | End: 2021-08-13

## 2021-08-08 RX ADMIN — METHYLPREDNISOLONE SODIUM SUCCINATE 40 MG: 40 INJECTION, POWDER, FOR SOLUTION INTRAMUSCULAR; INTRAVENOUS at 08:59

## 2021-08-08 RX ADMIN — SODIUM CHLORIDE, PRESERVATIVE FREE 10 ML: 5 INJECTION INTRAVENOUS at 09:04

## 2021-08-08 RX ADMIN — HYDROXYZINE HYDROCHLORIDE 25 MG: 25 TABLET, FILM COATED ORAL at 08:59

## 2021-08-08 RX ADMIN — ENOXAPARIN SODIUM 40 MG: 40 INJECTION SUBCUTANEOUS at 08:59

## 2021-08-08 RX ADMIN — ALBUTEROL SULFATE 2.5 MG: 2.5 SOLUTION RESPIRATORY (INHALATION) at 07:07

## 2021-08-08 RX ADMIN — LISINOPRIL 20 MG: 20 TABLET ORAL at 08:59

## 2021-08-08 RX ADMIN — FAMOTIDINE 20 MG: 10 INJECTION, SOLUTION INTRAVENOUS at 09:00

## 2021-08-08 RX ADMIN — ALBUTEROL SULFATE 2.5 MG: 2.5 SOLUTION RESPIRATORY (INHALATION) at 14:38

## 2021-08-08 RX ADMIN — PREDNISONE 40 MG: 20 TABLET ORAL at 16:41

## 2021-08-08 RX ADMIN — HYDROXYZINE HYDROCHLORIDE 25 MG: 25 TABLET, FILM COATED ORAL at 16:41

## 2021-08-08 RX ADMIN — ALBUTEROL SULFATE 2.5 MG: 2.5 SOLUTION RESPIRATORY (INHALATION) at 10:50

## 2021-08-08 RX ADMIN — DULOXETINE HYDROCHLORIDE 60 MG: 60 CAPSULE, DELAYED RELEASE ORAL at 08:59

## 2021-08-08 NOTE — PLAN OF CARE
Problem: Falls - Risk of:  Goal: Will remain free from falls  Description: Will remain free from falls  8/8/2021 0313 by Roxy Burkett RN  Outcome: Ongoing  8/7/2021 1820 by Randa Todd RN  Outcome: Met This Shift  Goal: Absence of physical injury  Description: Absence of physical injury  8/8/2021 0313 by Roxy Burkett RN  Outcome: Ongoing  8/7/2021 1820 by Randa Todd RN  Outcome: Met This Shift     Problem: Breathing Pattern - Ineffective:  Goal: Ability to achieve and maintain a regular respiratory rate will improve  Description: Ability to achieve and maintain a regular respiratory rate will improve  8/8/2021 0313 by Roxy Burkett RN  Outcome: Ongoing  8/7/2021 1820 by Randa Todd RN  Outcome: Ongoing     Problem:  Activity:  Goal: Ability to tolerate increased activity will improve  Description: Ability to tolerate increased activity will improve  8/8/2021 0313 by Roxy Burkett RN  Outcome: Ongoing  8/7/2021 1820 by Randa Todd RN  Outcome: Met This Shift

## 2021-08-08 NOTE — PROGRESS NOTES
Transferred to room 2069 from ICU per Bed. Oriented to room and call light. Vitals and assessment completed, no distress noted.

## 2021-08-08 NOTE — PROGRESS NOTES
Dr. Gabriele Ellington notified of Dr. Jane Denny for discharge. Patient eager to leave. Dr. Gabriele Ellington recommends for patient to follow with her PCP in 1 week.

## 2021-08-08 NOTE — CARE COORDINATION
ONGOING DISCHARGE PLAN:    Spoke with patient yesterday regarding discharge plan, and she confirmed that plan is home without needs. VNS was declined. Active order for neb treatments and IV Solu-medrol 40mg every 8 hours. Will continue to follow for additional discharge needs.     Electronically signed by Allan Sampson RN on 8/8/2021 at 8:23 AM
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
## 2021-08-12 LAB
EKG ATRIAL RATE: 102 BPM
EKG P AXIS: 74 DEGREES
EKG P-R INTERVAL: 158 MS
EKG Q-T INTERVAL: 374 MS
EKG QRS DURATION: 88 MS
EKG QTC CALCULATION (BAZETT): 487 MS
EKG R AXIS: 59 DEGREES
EKG T AXIS: 71 DEGREES
EKG VENTRICULAR RATE: 102 BPM

## 2021-08-12 PROCEDURE — 93010 ELECTROCARDIOGRAM REPORT: CPT | Performed by: INTERNAL MEDICINE

## 2021-11-04 ENCOUNTER — HOSPITAL ENCOUNTER (EMERGENCY)
Age: 51
Discharge: HOME | End: 2021-11-04
Attending: EMERGENCY MEDICINE
Payer: MEDICARE

## 2021-11-04 ENCOUNTER — APPOINTMENT (OUTPATIENT)
Dept: GENERAL RADIOLOGY | Age: 51
End: 2021-11-04
Payer: MEDICARE

## 2021-11-04 VITALS
TEMPERATURE: 98.1 F | DIASTOLIC BLOOD PRESSURE: 70 MMHG | HEIGHT: 64 IN | RESPIRATION RATE: 21 BRPM | SYSTOLIC BLOOD PRESSURE: 117 MMHG | OXYGEN SATURATION: 95 % | WEIGHT: 139 LBS | BODY MASS INDEX: 23.73 KG/M2 | HEART RATE: 80 BPM

## 2021-11-04 DIAGNOSIS — S22.31XA CLOSED FRACTURE OF ONE RIB OF RIGHT SIDE, INITIAL ENCOUNTER: Primary | ICD-10-CM

## 2021-11-04 PROCEDURE — 71046 X-RAY EXAM CHEST 2 VIEWS: CPT

## 2021-11-04 PROCEDURE — 73030 X-RAY EXAM OF SHOULDER: CPT

## 2021-11-04 PROCEDURE — 99283 EMERGENCY DEPT VISIT LOW MDM: CPT

## 2021-11-04 PROCEDURE — 6370000000 HC RX 637 (ALT 250 FOR IP): Performed by: EMERGENCY MEDICINE

## 2021-11-04 RX ORDER — OXYCODONE HYDROCHLORIDE AND ACETAMINOPHEN 5; 325 MG/1; MG/1
1 TABLET ORAL EVERY 4 HOURS PRN
Qty: 15 TABLET | Refills: 0 | Status: SHIPPED | OUTPATIENT
Start: 2021-11-04 | End: 2021-11-09

## 2021-11-04 RX ORDER — OXYCODONE HYDROCHLORIDE AND ACETAMINOPHEN 5; 325 MG/1; MG/1
1 TABLET ORAL ONCE
Status: COMPLETED | OUTPATIENT
Start: 2021-11-04 | End: 2021-11-04

## 2021-11-04 RX ORDER — LIDOCAINE 4 G/G
1 PATCH TOPICAL DAILY
Qty: 30 PATCH | Refills: 0 | Status: SHIPPED | OUTPATIENT
Start: 2021-11-04 | End: 2021-12-04

## 2021-11-04 RX ORDER — ACETAMINOPHEN 325 MG/1
650 TABLET ORAL ONCE
Status: DISCONTINUED | OUTPATIENT
Start: 2021-11-04 | End: 2021-11-04

## 2021-11-04 RX ADMIN — OXYCODONE HYDROCHLORIDE AND ACETAMINOPHEN 1 TABLET: 5; 325 TABLET ORAL at 20:02

## 2021-11-04 ASSESSMENT — ENCOUNTER SYMPTOMS
WHEEZING: 0
DIARRHEA: 0
NAUSEA: 0
BACK PAIN: 1
COUGH: 1
VOMITING: 0
SHORTNESS OF BREATH: 1
ABDOMINAL PAIN: 1

## 2021-11-04 ASSESSMENT — PAIN SCALES - GENERAL
PAINLEVEL_OUTOF10: 10
PAINLEVEL_OUTOF10: 10

## 2021-11-04 ASSESSMENT — PAIN DESCRIPTION - PAIN TYPE: TYPE: ACUTE PAIN

## 2021-11-04 ASSESSMENT — PAIN DESCRIPTION - ORIENTATION: ORIENTATION: RIGHT

## 2021-11-04 ASSESSMENT — PAIN DESCRIPTION - LOCATION: LOCATION: CHEST

## 2021-11-04 NOTE — ED PROVIDER NOTES
[facility]  ED  Emergency Department Encounter  Non Emergency Medicine Resident     Pt Name: Venancio Napoles  MRN: [id number]  Autumngfitalia 1970  Date of evaluation: 11/4/21  PCP:  Gerardo Patel MD    [address] [place] [address]       Chief Complaint   Patient presents with    Rib Pain (injury)     mechanical fall down 7 steps. states did not catch self and hit primarily on right ribs     HISTORY OF PRESENT ILLNESS  (Location/Symptom, Timing/Onset, Context/Setting,Quality, Duration, Modifying Factors, Severity.)      Venancio Napoles is a 45 y.o. female who presents after fall with right sided rib pain and shoulder tenderness. Patient reports she fell down the stairs in her house the previous day at 22:30. There was pain post-fall, however, pain acutely worsened following a sneeze. Patient is tearful and reports 10 out of 10 pain. Reports it is hard to breathe and nothing makes it better. Patient tried \"generic pain meds\" but unable to name which ones. Patient reports occasional gait abnormalities and falls after tripping over things. Patient reports occasional dizziness, but not at time of the fall. Denies numbness or tingling in lower extremities, feeling pre-syncopal, lightheadedness. Denies reaching out and falling on her hand or hitting her head or neck. Endorses urinary frequency. Denies nausea, vomiting, diarrhea, fever, feeling unsafe in relationship. PAST MEDICAL / SURGICAL /SOCIAL / FAMILY HISTORY      has a past medical history of Anxiety, Bipolar disorder ([address] Brain aneurysm, Depression, Epilepsy ([address] Essential hypertension, Gastroesophageal reflux disease without esophagitis, Headache, Hyperlipidemia, Liver disease, Manic depression ([address] Pancreatitis, Scoliosis, and Seasonal allergies. has a past surgical history that includes hernia repair; Tonsillectomy; other surgical history (09/22/2015); Hysterectomy; hc picc power 5f triple (9/23/2015); and brain surgery.     Social History     Socioeconomic History    Marital status: Single     Spouse name: Not on file    Number of children: Not on file    Years of education: Not on file    Highest education level: Not on file   Occupational History    Not on file   Tobacco Use    Smoking status: Current Every Day Smoker     Packs/day: 0.50     Types: Cigarettes     Last attempt to quit: 2015     Years since quittin.1    Smokeless tobacco: Never Used   Substance and Sexual Activity    Alcohol use: Yes     Alcohol/week: 3.0 standard drinks     Types: 3 Cans of beer per week     Comment: 3 cans per week     Drug use: Yes     Types: Marijuana Maurita Handsome)    Sexual activity: Never   Other Topics Concern    Not on file   Social History Narrative    Not on file     Social Determinants of Health     Financial Resource Strain:     Difficulty of Paying Living Expenses:    Food Insecurity:     Worried About Running Out of Food in the Last Year:     Ran Out of Food in the Last Year:    Transportation Needs:     Lack of Transportation (Medical):      Lack of Transportation (Non-Medical):    Physical Activity:     Days of Exercise per Week:     Minutes of Exercise per Session:    Stress:     Feeling of Stress :    Social Connections:     Frequency of Communication with Friends and Family:     Frequency of Social Gatherings with Friends and Family:     Attends Jainism Services:     Active Member of Clubs or Organizations:     Attends Club or Organization Meetings:     Marital Status:    Intimate Partner Violence:     Fear of Current or Ex-Partner:     Emotionally Abused:     Physically Abused:     Sexually Abused:      Family History   Problem Relation Age of Onset    Emphysema Mother     Seizures Sister     Lupus Sister      Allergies:  Benadryl [diphenhydramine], Buspar [buspirone], Codeine, Flexeril [cyclobenzaprine], Ibuprofen, Lamotrigine, Paxil [paroxetine hcl], Pcn [penicillins], Simvastatin, and Zoloft [sertraline hcl]    Home Medications:  Prior to Admission medications    Medication Sig Start Date End Date Taking? Authorizing Provider   oxyCODONE-acetaminophen (PERCOCET) 5-325 MG per tablet Take 1 tablet by mouth every 4 hours as needed for Pain (As often as every 4 hours.) for up to 5 days. Intended supply: 3 days. Take lowest dose possible to manage pain 11/4/21 11/9/21 Yes Tl Thomas MD   lidocaine 4 % external patch Place 1 patch onto the skin daily 11/4/21 12/4/21 Yes Tl Thomas MD   acetaminophen (TYLENOL) 500 MG tablet Take 1 tablet by mouth 4 times daily as needed for Pain 7/8/21 7/18/21  E Checo De Leon MD   ibuprofen (ADVIL;MOTRIN) 800 MG tablet Take 1 tablet by mouth every 8 hours as needed for Pain 12/12/20   Winston Valencia DO   lisinopril (PRINIVIL;ZESTRIL) 5 MG tablet TAKE 1 TABLET BY MOUTH DAILY 6/3/20   Margaret Romero MD   omeprazole (PRILOSEC) 20 MG delayed release capsule TAKE 1 CAPSULE BY MOUTH DAILY 5/13/20   David Sweet MD   Pharmacist Choice Lancets MISC USE ONCE A DAY 5/13/20   Margaret Romero MD   promethazine (PHENERGAN) 25 MG tablet Take 12.5-25 mg by mouth 11/24/18   Historical Provider, MD   levomilnacipran (FETZIMA) 40 MG CP24 extended release capsule Take 40 mg by mouth    Historical Provider, MD   albuterol sulfate  (90 Base) MCG/ACT inhaler INHALE 2 PUFFS INTO THE LUNGS EVERY 6 HOURS AS NEEDED FOR WHEEZING 1/15/20   Tiffany Hsieh MD   blood glucose monitor strips 1 each by In Vitro route daily As needed.  7/3/19   Tiffany Hsieh MD   vitamin D (ERGOCALCIFEROL) 78993 units CAPS capsule TAKE 1 CAPSULE BY MOUTH 1 TIME A WEEK FOR 8 DOSES 4/9/19   Tiffany Hsieh MD   sodium chloride (OCEAN) 0.65 % nasal spray 1 spray by Nasal route as needed for Congestion 1/31/19   Tiffany Hsieh MD   atorvastatin (LIPITOR) 40 MG tablet TAKE 1 TABLET BY MOUTH DAILY 1/31/19   Tiffany Hsieh MD   glucose monitoring kit (FREESTYLE) monitoring kit 1 kit by Does not apply route Once in dialysis for 1 dose 11/6/18 11/6/18  Leoncio Isabel MD   Alcohol Swabs (ALCOHOL PADS) 70 % PADS 1 Units by Does not apply route daily 11/6/18   Leoncio Isabel MD   metFORMIN (GLUCOPHAGE) 500 MG tablet 500 mg PO daily for 1 week Then 500 mg PO twice daily 7/24/18   Annamaria Reyes MD   Blood Pressure Monitoring (BLOOD PRESSURE KIT) JOE Check BP daily 4/30/18   Kelley Edouard MD   DULoxetine (CYMBALTA) 60 MG extended release capsule Take 60 mg by mouth 2 times daily 1/18/18   Historical Provider, MD   QUEtiapine (SEROQUEL XR) 200 MG extended release tablet Take 200 mg by mouth daily 1/18/18   Historical Provider, MD   docusate sodium (COLACE) 100 MG capsule Take 100 mg by mouth 2 times daily    Historical Provider, MD   hydrOXYzine (VISTARIL) 25 MG capsule Take 25 mg by mouth 3 times daily     Historical Provider, MD     REVIEW OF SYSTEMS    (2-9 systems for level 4, 10 or more for level 5)      Review of Systems   Constitutional: Negative for fever. HENT: Negative for congestion. Respiratory: Positive for cough and shortness of breath (2/2 pain). Negative for wheezing. Cardiovascular: Negative for chest pain. Gastrointestinal: Positive for abdominal pain (right lateral area inferior to breast extending to the right back). Negative for diarrhea, nausea and vomiting. Genitourinary: Positive for dysuria and frequency. Musculoskeletal: Positive for back pain and gait problem. Negative for joint swelling, neck pain and neck stiffness. Skin: Positive for wound (sharp scratches to right side of neck). Neurological: Positive for dizziness (at times, but not during fall). Psychiatric/Behavioral: Negative for agitation, behavioral problems and confusion.      PHYSICAL EXAM   (up to 7for level 4, 8 or more for level 5)      INITIAL VITALS:   /70   Pulse 80   Temp 98.1 °F (36.7 °C) (Oral)   Resp 21   Ht 5' 4\" (1.626 m)   Wt 139 lb (63 kg)   SpO2 95%   BMI 23.86 kg/m² Physical Exam  Constitutional:       General: She is not in acute distress. Appearance: She is ill-appearing. She is not toxic-appearing. Eyes:      Extraocular Movements: Extraocular movements intact. Cardiovascular:      Rate and Rhythm: Regular rhythm. Pulses: Normal pulses. Pulmonary:      Effort: No respiratory distress. Breath sounds: Normal breath sounds. No stridor. No wheezing, rhonchi or rales. Musculoskeletal:         General: Tenderness (right shoulder, right lower ribs) and signs of injury present. Right lower leg: No edema. Left lower leg: No edema. Skin:     General: Skin is warm and dry. Findings: Bruising (no overt bruising, slight darkening ) and lesion (neck lesions) present. Neurological:      General: No focal deficit present. Mental Status: She is alert. Gait: Gait normal.   Psychiatric:         Mood and Affect: Mood normal.         Behavior: Behavior normal.       DIFFERENTIAL  DIAGNOSIS     PLAN (LABS / IMAGING / EKG):  Orders Placed This Encounter   Procedures    XR CHEST (2 VW)    XR SHOULDER RIGHT (MIN 2 VIEWS)     MEDICATIONSORDERED:  Orders Placed This Encounter   Medications    oxyCODONE-acetaminophen (PERCOCET) 5-325 MG per tablet 1 tablet    DISCONTD: acetaminophen (TYLENOL) tablet 650 mg    oxyCODONE-acetaminophen (PERCOCET) 5-325 MG per tablet     Sig: Take 1 tablet by mouth every 4 hours as needed for Pain (As often as every 4 hours.) for up to 5 days. Intended supply: 3 days. Take lowest dose possible to manage pain     Dispense:  15 tablet     Refill:  0    lidocaine 4 % external patch     Sig: Place 1 patch onto the skin daily     Dispense:  30 patch     Refill:  0     DDX: rib fracture, shoulder dislocation, shoulder fracture    DIAGNOSTIC RESULTS / EMERGENCY DEPARTMENT COURSE / MDM     LABS:  No results found for this visit on 11/04/21.     IMPRESSION: 46 yo female presents after fall with rib pain and shoulder tenderness. RADIOLOGY:  CXR:   No acute process.       Stable compared to prior study         XR shoulder right  No acute fracture or dislocation of the right shoulder. EKG  None    All EKG's are interpreted by the Emergency Department Physician who either signs or Co-signsthis chart in the absence of a cardiologist.    EMERGENCY DEPARTMENT COURSE:  ED Course as of Nov 04 2126   Thu Nov 04, 2021 2030 Patient reevaluated. Pain remains severe. [IK]   2102 Discharge with 15 percocet, 1 lidocaine patch, incentive spirometry. Follow up with PCP appt is tomorrow per patient. [IK]      ED Course User Index  [IK] Stefano Mukherjee MD     PROCEDURES:  None    CONSULTS:  None    CRITICAL CARE:  None    FINAL IMPRESSION      1. Closed fracture of one rib of right side, initial encounter        DISPOSITION / PLAN     DISPOSITION: home      PATIENT REFERRED TO:  Stan Milan, [address] [place] [address]  [phone]    Go in 1 day  As needed, If symptoms worsen    [facility]  [address] [place] [address]  [phone]  Go to   If symptoms worsen    DISCHARGE MEDICATIONS:  Discharge Medication List as of 11/4/2021  8:59 PM      START taking these medications    Details   oxyCODONE-acetaminophen (PERCOCET) 5-325 MG per tablet Take 1 tablet by mouth every 4 hours as needed for Pain (As often as every 4 hours.) for up to 5 days. Intended supply: 3 days.  Take lowest dose possible to manage pain, Disp-15 tablet, R-0Print           Stefano Mukherjee MD  [facility]  Non-emergency medicine resident     Stefano Mukherjee MD  Resident  11/04/21 2127

## 2021-11-04 NOTE — ED PROVIDER NOTES
Right-sided chest pain for 1 day after mechanical fall hitting steps. Pain is located in the lateral component of the right chest wall respirations are normal significant pain and difficulty with breathing on deep inspiration her abdomen is soft and nontender she has no other injuries. [address] [facility]     Emergency Department     Faculty Attestation    I performed a history and physical examination of the patient and discussed management with the resident. I reviewed the residents note and agree with the documented findings and plan of care. Any areas of disagreement are noted on the chart. I was personally present for the key portions of any procedures. I have documented in the chart those procedures where I was not present during the key portions. I have reviewed the emergency nurses triage note. I agree with the chief complaint, past medical history, past surgical history, allergies, medications, social and family history as documented unless otherwise noted below. For Physician Assistant/ Nurse Practitioner cases/documentation I have personally evaluated this patient and have completed at least one if not all key elements of the E/M (history, physical exam, and MDM). Additional findings are as noted. I have personally seen and evaluated the patient. I find the patient's history and physical exam are consistent with the NP/PA documentation. I agree with the care provided, treatment rendered, disposition and follow-up plan. Critical Care     Brianne Ohara M.D.   Attending Emergency  Physician             Vibha Bonilla MD  11/04/21 0703

## 2021-11-05 NOTE — PROGRESS NOTES
Incentive Spirometry education and demonstration given by Respiratory Therapy. Minimum Predicted Vital Capacity is 2250 mL. Pt achieving 1000 mL at time of instruction.       Tresa Koyanagi, RCP  9:05 PM

## 2022-04-05 ENCOUNTER — HOSPITAL ENCOUNTER (OUTPATIENT)
Age: 52
Setting detail: SPECIMEN
Discharge: HOME | End: 2022-04-05

## 2022-04-05 ENCOUNTER — OFFICE VISIT (OUTPATIENT)
Dept: FAMILY MEDICINE CLINIC | Age: 52
End: 2022-04-05
Payer: MEDICARE

## 2022-04-05 VITALS
HEART RATE: 89 BPM | BODY MASS INDEX: 23.52 KG/M2 | DIASTOLIC BLOOD PRESSURE: 68 MMHG | SYSTOLIC BLOOD PRESSURE: 124 MMHG | WEIGHT: 137 LBS

## 2022-04-05 DIAGNOSIS — Z12.39 ENCOUNTER FOR SCREENING FOR MALIGNANT NEOPLASM OF BREAST, UNSPECIFIED SCREENING MODALITY: ICD-10-CM

## 2022-04-05 DIAGNOSIS — E11.9 TYPE 2 DIABETES MELLITUS WITHOUT COMPLICATION, WITHOUT LONG-TERM CURRENT USE OF INSULIN (HCC): Primary | ICD-10-CM

## 2022-04-05 DIAGNOSIS — I10 ESSENTIAL HYPERTENSION: ICD-10-CM

## 2022-04-05 DIAGNOSIS — R05.3 CHRONIC COUGH: ICD-10-CM

## 2022-04-05 DIAGNOSIS — E78.00 PURE HYPERCHOLESTEROLEMIA: ICD-10-CM

## 2022-04-05 DIAGNOSIS — F17.200 SMOKER: ICD-10-CM

## 2022-04-05 DIAGNOSIS — R53.82 CHRONIC FATIGUE: ICD-10-CM

## 2022-04-05 LAB
ABSOLUTE EOS #: 0.36 K/UL (ref 0–0.44)
ABSOLUTE IMMATURE GRANULOCYTE: <0.03 K/UL (ref 0–0.3)
ABSOLUTE LYMPH #: 3.29 K/UL (ref 1.1–3.7)
ABSOLUTE MONO #: 0.46 K/UL (ref 0.1–1.2)
ALBUMIN SERPL-MCNC: 4.7 G/DL (ref 3.5–5.2)
ALBUMIN/GLOBULIN RATIO: 1.6 (ref 1–2.5)
ALP BLD-CCNC: 51 U/L (ref 35–104)
ALT SERPL-CCNC: 16 U/L (ref 5–33)
ANION GAP SERPL CALCULATED.3IONS-SCNC: 10 MMOL/L (ref 9–17)
AST SERPL-CCNC: 19 U/L
BASOPHILS # BLD: 1 % (ref 0–2)
BASOPHILS ABSOLUTE: 0.08 K/UL (ref 0–0.2)
BILIRUB SERPL-MCNC: 0.38 MG/DL (ref 0.3–1.2)
BUN BLDV-MCNC: 9 MG/DL (ref 6–20)
CALCIUM SERPL-MCNC: 10.1 MG/DL (ref 8.6–10.4)
CHLORIDE BLD-SCNC: 97 MMOL/L (ref 98–107)
CHOLESTEROL/HDL RATIO: 6.4
CHOLESTEROL: 380 MG/DL
CO2: 28 MMOL/L (ref 20–31)
CREAT SERPL-MCNC: 0.58 MG/DL (ref 0.5–0.9)
CREATININE URINE: 75 MG/DL (ref 28–217)
EOSINOPHILS RELATIVE PERCENT: 5 % (ref 1–4)
GFR AFRICAN AMERICAN: >60 ML/MIN
GFR NON-AFRICAN AMERICAN: >60 ML/MIN
GFR SERPL CREATININE-BSD FRML MDRD: ABNORMAL ML/MIN/{1.73_M2}
GLUCOSE BLD-MCNC: 116 MG/DL (ref 70–99)
HBA1C MFR BLD: 6.4 %
HCT VFR BLD CALC: 43.6 % (ref 36.3–47.1)
HDLC SERPL-MCNC: 59 MG/DL
HEMOGLOBIN: 14.4 G/DL (ref 11.9–15.1)
IMMATURE GRANULOCYTES: 0 %
LDL CHOLESTEROL: 265 MG/DL (ref 0–130)
LYMPHOCYTES # BLD: 43 % (ref 24–43)
MCH RBC QN AUTO: 31.5 PG (ref 25.2–33.5)
MCHC RBC AUTO-ENTMCNC: 33 G/DL (ref 28.4–34.8)
MCV RBC AUTO: 95.4 FL (ref 82.6–102.9)
MICROALBUMIN/CREAT 24H UR: <12 MG/L
MICROALBUMIN/CREAT UR-RTO: NORMAL MCG/MG CREAT
MONOCYTES # BLD: 6 % (ref 3–12)
NRBC AUTOMATED: 0 PER 100 WBC
PDW BLD-RTO: 11.9 % (ref 11.8–14.4)
PLATELET # BLD: 271 K/UL (ref 138–453)
PMV BLD AUTO: 10.5 FL (ref 8.1–13.5)
POTASSIUM SERPL-SCNC: 4.5 MMOL/L (ref 3.7–5.3)
RBC # BLD: 4.57 M/UL (ref 3.95–5.11)
SEG NEUTROPHILS: 45 % (ref 36–65)
SEGMENTED NEUTROPHILS ABSOLUTE COUNT: 3.42 K/UL (ref 1.5–8.1)
SODIUM BLD-SCNC: 135 MMOL/L (ref 135–144)
TOTAL PROTEIN: 7.7 G/DL (ref 6.4–8.3)
TRIGL SERPL-MCNC: 281 MG/DL
TSH SERPL DL<=0.05 MIU/L-ACNC: 1.2 UIU/ML (ref 0.3–5)
WBC # BLD: 7.6 K/UL (ref 3.5–11.3)

## 2022-04-05 PROCEDURE — 94010 BREATHING CAPACITY TEST: CPT | Performed by: STUDENT IN AN ORGANIZED HEALTH CARE EDUCATION/TRAINING PROGRAM

## 2022-04-05 PROCEDURE — 99213 OFFICE O/P EST LOW 20 MIN: CPT | Performed by: STUDENT IN AN ORGANIZED HEALTH CARE EDUCATION/TRAINING PROGRAM

## 2022-04-05 PROCEDURE — 3044F HG A1C LEVEL LT 7.0%: CPT | Performed by: STUDENT IN AN ORGANIZED HEALTH CARE EDUCATION/TRAINING PROGRAM

## 2022-04-05 PROCEDURE — 4004F PT TOBACCO SCREEN RCVD TLK: CPT | Performed by: STUDENT IN AN ORGANIZED HEALTH CARE EDUCATION/TRAINING PROGRAM

## 2022-04-05 PROCEDURE — G8427 DOCREV CUR MEDS BY ELIG CLIN: HCPCS | Performed by: STUDENT IN AN ORGANIZED HEALTH CARE EDUCATION/TRAINING PROGRAM

## 2022-04-05 PROCEDURE — G8420 CALC BMI NORM PARAMETERS: HCPCS | Performed by: STUDENT IN AN ORGANIZED HEALTH CARE EDUCATION/TRAINING PROGRAM

## 2022-04-05 PROCEDURE — 3017F COLORECTAL CA SCREEN DOC REV: CPT | Performed by: STUDENT IN AN ORGANIZED HEALTH CARE EDUCATION/TRAINING PROGRAM

## 2022-04-05 PROCEDURE — 2022F DILAT RTA XM EVC RTNOPTHY: CPT | Performed by: STUDENT IN AN ORGANIZED HEALTH CARE EDUCATION/TRAINING PROGRAM

## 2022-04-05 PROCEDURE — 83036 HEMOGLOBIN GLYCOSYLATED A1C: CPT | Performed by: STUDENT IN AN ORGANIZED HEALTH CARE EDUCATION/TRAINING PROGRAM

## 2022-04-05 RX ORDER — LISINOPRIL 5 MG/1
5 TABLET ORAL DAILY
Qty: 30 TABLET | Refills: 0 | Status: SHIPPED | OUTPATIENT
Start: 2022-04-05 | End: 2022-05-05

## 2022-04-05 RX ORDER — TIOTROPIUM BROMIDE 18 UG/1
18 CAPSULE ORAL; RESPIRATORY (INHALATION) DAILY
Qty: 30 CAPSULE | Refills: 0 | Status: SHIPPED | OUTPATIENT
Start: 2022-04-05 | End: 2022-04-19 | Stop reason: SDUPTHER

## 2022-04-05 RX ORDER — ATORVASTATIN CALCIUM 40 MG/1
40 TABLET, FILM COATED ORAL DAILY
Qty: 30 TABLET | Refills: 0 | Status: SHIPPED | OUTPATIENT
Start: 2022-04-05 | End: 2022-05-05

## 2022-04-05 RX ORDER — OMEPRAZOLE 20 MG/1
20 CAPSULE, DELAYED RELEASE ORAL DAILY
Qty: 30 CAPSULE | Refills: 3 | Status: SHIPPED | OUTPATIENT
Start: 2022-04-05 | End: 2022-08-17

## 2022-04-05 ASSESSMENT — PATIENT HEALTH QUESTIONNAIRE - PHQ9
SUM OF ALL RESPONSES TO PHQ QUESTIONS 1-9: 5
SUM OF ALL RESPONSES TO PHQ QUESTIONS 1-9: 5
10. IF YOU CHECKED OFF ANY PROBLEMS, HOW DIFFICULT HAVE THESE PROBLEMS MADE IT FOR YOU TO DO YOUR WORK, TAKE CARE OF THINGS AT HOME, OR GET ALONG WITH OTHER PEOPLE: 0
7. TROUBLE CONCENTRATING ON THINGS, SUCH AS READING THE NEWSPAPER OR WATCHING TELEVISION: 0
SUM OF ALL RESPONSES TO PHQ9 QUESTIONS 1 & 2: 2
SUM OF ALL RESPONSES TO PHQ QUESTIONS 1-9: 5
8. MOVING OR SPEAKING SO SLOWLY THAT OTHER PEOPLE COULD HAVE NOTICED. OR THE OPPOSITE, BEING SO FIGETY OR RESTLESS THAT YOU HAVE BEEN MOVING AROUND A LOT MORE THAN USUAL: 2
SUM OF ALL RESPONSES TO PHQ QUESTIONS 1-9: 5
2. FEELING DOWN, DEPRESSED OR HOPELESS: 2
2. FEELING DOWN, DEPRESSED OR HOPELESS: 2
SUM OF ALL RESPONSES TO PHQ QUESTIONS 1-9: 8
SUM OF ALL RESPONSES TO PHQ9 QUESTIONS 1 & 2: 5
9. THOUGHTS THAT YOU WOULD BE BETTER OFF DEAD, OR OF HURTING YOURSELF: 0
6. FEELING BAD ABOUT YOURSELF - OR THAT YOU ARE A FAILURE OR HAVE LET YOURSELF OR YOUR FAMILY DOWN: 0
SUM OF ALL RESPONSES TO PHQ QUESTIONS 1-9: 8
3. TROUBLE FALLING OR STAYING ASLEEP: 0
4. FEELING TIRED OR HAVING LITTLE ENERGY: 3
1. LITTLE INTEREST OR PLEASURE IN DOING THINGS: 0
SUM OF ALL RESPONSES TO PHQ QUESTIONS 1-9: 8
5. POOR APPETITE OR OVEREATING: 1
1. LITTLE INTEREST OR PLEASURE IN DOING THINGS: 3
SUM OF ALL RESPONSES TO PHQ QUESTIONS 1-9: 8

## 2022-04-05 ASSESSMENT — ENCOUNTER SYMPTOMS
ABDOMINAL PAIN: 0
CHOKING: 0
CONSTIPATION: 0
DIARRHEA: 0
COUGH: 1
SHORTNESS OF BREATH: 0
WHEEZING: 0

## 2022-04-05 NOTE — PROGRESS NOTES
Attending Physician Statement    Wt Readings from Last 3 Encounters:   04/05/22 137 lb (62.1 kg)   11/04/21 139 lb (63 kg)   08/07/21 134 lb 4.2 oz (60.9 kg)     Temp Readings from Last 3 Encounters:   11/04/21 98.1 °F (36.7 °C) (Oral)   08/08/21 97.9 °F (36.6 °C)   07/08/21 98 °F (36.7 °C) (Oral)     BP Readings from Last 3 Encounters:   04/05/22 124/68   11/04/21 117/70   08/08/21 96/60     Pulse Readings from Last 3 Encounters:   04/05/22 89   11/04/21 80   08/08/21 105         I have discussed the care of Mary Perales, including pertinent history and exam findings with the resident. I have reviewed the key elements of all parts of the encounter with the resident. I agree with the assessment, plan and orders as documented by the resident.   (GE Modifier)

## 2022-04-05 NOTE — PATIENT INSTRUCTIONS
Thank you for letting us take care of you today. We hope all your questions were addressed. If a question was overlooked or something else comes to mind after you return home, please contact a member of your Care Team listed below. Your Care Team at [place] [address] is Team #2  Vinnie Garrett DO (Faculty)  Boogie Lockhart (Faculty)  Lucio Zaragoza MD (Resident)  Tim Turner MD (Resident)  Heaven Matamoros MD (Resident)  Richar Lehman MD (Resident)  Jeff Marc., ANDRES Johnson.,  JOHN Corado., [facility] office)  Paola Soto [place] (Clinical Practice Manager)  Trevin [facility] (Clinical Pharmacist)     Office phone number: [phone]    If you need to get in right away due to illness, please be advised we have \"Same Day\" appointments available Monday-Friday. Please call us at [phone] option #3 to schedule your \"Same Day\" appointment.
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
## 2022-04-19 ENCOUNTER — OFFICE VISIT (OUTPATIENT)
Dept: FAMILY MEDICINE CLINIC | Age: 52
End: 2022-04-19
Payer: MEDICARE

## 2022-04-19 VITALS
TEMPERATURE: 98.1 F | BODY MASS INDEX: 24.2 KG/M2 | SYSTOLIC BLOOD PRESSURE: 105 MMHG | DIASTOLIC BLOOD PRESSURE: 65 MMHG | HEART RATE: 86 BPM | WEIGHT: 141 LBS

## 2022-04-19 DIAGNOSIS — R05.3 CHRONIC COUGH: Primary | ICD-10-CM

## 2022-04-19 DIAGNOSIS — F17.200 SMOKER: ICD-10-CM

## 2022-04-19 PROCEDURE — G8420 CALC BMI NORM PARAMETERS: HCPCS | Performed by: STUDENT IN AN ORGANIZED HEALTH CARE EDUCATION/TRAINING PROGRAM

## 2022-04-19 PROCEDURE — 99213 OFFICE O/P EST LOW 20 MIN: CPT | Performed by: STUDENT IN AN ORGANIZED HEALTH CARE EDUCATION/TRAINING PROGRAM

## 2022-04-19 PROCEDURE — G8427 DOCREV CUR MEDS BY ELIG CLIN: HCPCS | Performed by: STUDENT IN AN ORGANIZED HEALTH CARE EDUCATION/TRAINING PROGRAM

## 2022-04-19 PROCEDURE — 99211 OFF/OP EST MAY X REQ PHY/QHP: CPT | Performed by: STUDENT IN AN ORGANIZED HEALTH CARE EDUCATION/TRAINING PROGRAM

## 2022-04-19 PROCEDURE — 3017F COLORECTAL CA SCREEN DOC REV: CPT | Performed by: STUDENT IN AN ORGANIZED HEALTH CARE EDUCATION/TRAINING PROGRAM

## 2022-04-19 PROCEDURE — 4004F PT TOBACCO SCREEN RCVD TLK: CPT | Performed by: STUDENT IN AN ORGANIZED HEALTH CARE EDUCATION/TRAINING PROGRAM

## 2022-04-19 RX ORDER — ALBUTEROL SULFATE 90 UG/1
2 AEROSOL, METERED RESPIRATORY (INHALATION) EVERY 6 HOURS PRN
Qty: 18 G | Refills: 5 | Status: SHIPPED | OUTPATIENT
Start: 2022-04-19

## 2022-04-19 RX ORDER — MULTIVIT-MIN/IRON FUM/FOLIC AC 7.5 MG-4
1 TABLET ORAL DAILY
Qty: 30 TABLET | Refills: 3 | Status: SHIPPED | OUTPATIENT
Start: 2022-04-19

## 2022-04-19 RX ORDER — TIOTROPIUM BROMIDE 18 UG/1
18 CAPSULE ORAL; RESPIRATORY (INHALATION) DAILY
Qty: 30 CAPSULE | Refills: 0 | Status: SHIPPED | OUTPATIENT
Start: 2022-04-19 | End: 2022-06-28

## 2022-04-19 ASSESSMENT — PATIENT HEALTH QUESTIONNAIRE - PHQ9
4. FEELING TIRED OR HAVING LITTLE ENERGY: 1
6. FEELING BAD ABOUT YOURSELF - OR THAT YOU ARE A FAILURE OR HAVE LET YOURSELF OR YOUR FAMILY DOWN: 0
3. TROUBLE FALLING OR STAYING ASLEEP: 1
1. LITTLE INTEREST OR PLEASURE IN DOING THINGS: 0
5. POOR APPETITE OR OVEREATING: 1
9. THOUGHTS THAT YOU WOULD BE BETTER OFF DEAD, OR OF HURTING YOURSELF: 0
10. IF YOU CHECKED OFF ANY PROBLEMS, HOW DIFFICULT HAVE THESE PROBLEMS MADE IT FOR YOU TO DO YOUR WORK, TAKE CARE OF THINGS AT HOME, OR GET ALONG WITH OTHER PEOPLE: 0
SUM OF ALL RESPONSES TO PHQ QUESTIONS 1-9: 3
SUM OF ALL RESPONSES TO PHQ QUESTIONS 1-9: 3
8. MOVING OR SPEAKING SO SLOWLY THAT OTHER PEOPLE COULD HAVE NOTICED. OR THE OPPOSITE, BEING SO FIGETY OR RESTLESS THAT YOU HAVE BEEN MOVING AROUND A LOT MORE THAN USUAL: 0
SUM OF ALL RESPONSES TO PHQ QUESTIONS 1-9: 3
SUM OF ALL RESPONSES TO PHQ QUESTIONS 1-9: 3
7. TROUBLE CONCENTRATING ON THINGS, SUCH AS READING THE NEWSPAPER OR WATCHING TELEVISION: 0
2. FEELING DOWN, DEPRESSED OR HOPELESS: 0
SUM OF ALL RESPONSES TO PHQ9 QUESTIONS 1 & 2: 0

## 2022-04-19 ASSESSMENT — ENCOUNTER SYMPTOMS
DIARRHEA: 0
COUGH: 1
ABDOMINAL PAIN: 0
CONSTIPATION: 0
WHEEZING: 0
SHORTNESS OF BREATH: 0

## 2022-04-19 NOTE — PROGRESS NOTES
Medication Management Service ([address] [place] [address])  [facility]  [phone]     CLINICAL PHARMACY NOTE:     Joi Peña MD requesting inhaler education for Spiriva handihaler and albuterol HFA. Educated patient on the following through demonstration:  · Spiriva handihaler (maintenance inhaler)  1. Separate one of the blisters from the blister card; then open the blister. 2. Flip open the lid to expose mouthpiece, then lift mouthpiece at the ridge to expose medication site. 3. Insert the capsule and close the mouthpiece firmly against the base until you hear a click. 4. Press the green piercing button once until it is flush with the body of the inhaler, then release. 5. Breathe out completely, facing away from the inhaler. 6. Put the HandiHaler in your mouth, breathe in quickly and deeply until your lungs are full. 7. You should hear or feel the capsule vibrate or rattle. 8. Hold your breath about 10 seconds or as long as comfortable. 9. Repeat inhalation steps (5 -8) to ensure you get all the medication from the capsule. 10. Disposal: hold the inhaler over a trash can and shake it out; it is important to not come into contact with capsule. 11. Inform patient to not prep the next dose after one is completed, as exposure to moisture may damage the medication. · Albuterol (rescue inhaler)  1. Shake the inhaler well before use (3 or 4 shakes). 2. Remove the cap. 3. Breathe out, away from your inhaler. 4. Bring the inhaler to your mouth. Place it in your mouth between your teeth and close you mouth around it. 5. Start to breathe in slowly. Press the top of your inhaler once and keep breathing in slowly until you have taken a full breath. 6. Remove the inhaler from your mouth, and hold your breath for about 10 seconds, then breathe out. 7. Repeat if necessary, waiting one to two minutes between doses. Patient verbalized understanding. All questions addressed.     Alexis Theodore, PharmD  PGY2 Ambulatory Care Pharmacy Resident    4/19/2022 3:19 PM    ============================================================    For Pharmacy Admin Tracking Only     CPA in place:  No   Recommendation Provided To: Patient/Caregiver: 2 via In person   Intervention Detail: Device Training   Gap Closed?: No    Intervention Accepted By: Patient/Caregiver: 2   Time Spent (min): 15

## 2022-04-19 NOTE — PROGRESS NOTES
Visit Information    Have you changed or started any medications since your last visit including any over-the-counter medicines, vitamins, or herbal medicines? no   Have you stopped taking any of your medications? Is so, why? -  no  Are you having any side effects from any of your medications? - no    Have you seen any other physician or provider since your last visit?  no   Have you had any other diagnostic tests since your last visit?  no   Have you been seen in the emergency room and/or had an admission in a hospital since we last saw you?  no   Have you had your routine dental cleaning in the past 6 months?  no     Do you have an active MyChart account? If no, what is the barrier?   No:     Patient Care Team:  Norma Early MD as PCP - General (Family Medicine)    Medical History Review  Past Medical, Family, and Social History reviewed and does not contribute to the patient presenting condition    Health Maintenance   Topic Date Due    COVID-19 Vaccine (1) Never done    Hepatitis B vaccine (1 of 3 - Risk 3-dose series) Never done    Colorectal Cancer Screen  Never done    Pneumococcal 0-64 years Vaccine (2 - PCV) 11/14/2018    Shingles Vaccine (1 of 2) Never done    Diabetic foot exam  06/01/2019    Breast cancer screen  01/11/2021    Diabetic retinal exam  02/18/2021    Flu vaccine (Season Ended) 09/01/2022    A1C test (Diabetic or Prediabetic)  04/05/2023    Diabetic microalbuminuria test  04/05/2023    Lipid screen  04/05/2023    Depression Monitoring  04/05/2023    Potassium monitoring  04/05/2023    Creatinine monitoring  04/05/2023    DTaP/Tdap/Td vaccine (2 - Td or Tdap) 11/14/2027    Hepatitis C screen  Completed    HIV screen  Completed    Hepatitis A vaccine  Aged Out    Hib vaccine  Aged Out    Meningococcal (ACWY) vaccine  Aged Out

## 2022-04-19 NOTE — PROGRESS NOTES
Attending Physician Statement    Wt Readings from Last 3 Encounters:   04/19/22 141 lb (64 kg)   04/05/22 137 lb (62.1 kg)   11/04/21 139 lb (63 kg)     Temp Readings from Last 3 Encounters:   04/19/22 98.1 °F (36.7 °C) (Temporal)   11/04/21 98.1 °F (36.7 °C) (Oral)   08/08/21 97.9 °F (36.6 °C)     BP Readings from Last 3 Encounters:   04/19/22 105/65   04/05/22 124/68   11/04/21 117/70     Pulse Readings from Last 3 Encounters:   04/19/22 86   04/05/22 89   11/04/21 80         I have discussed the care of [facility], including pertinent history and exam findings with the resident. I have reviewed the key elements of all parts of the encounter with the resident. I agree with the assessment, plan and orders as documented by the resident.   (GE Modifier)
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
## 2022-04-19 NOTE — PATIENT INSTRUCTIONS
Thank you for letting us take care of you today. We hope all your questions were addressed. If a question was overlooked or something else comes to mind after you return home, please contact a member of your Care Team listed below. Your Care Team at [place] [address] is Team #2  Abbey Ballard DO (Faculty)  Stephanie Colon (Faculty)  Shanice Ohara MD (Resident)  Fausto Smith MD (Resident)  Rosalva Tinoco MD (Resident)  Brandt Gonsalves MD (Resident)  Nicolette Martínez MD (Resident)  AMANDA Salgado. ,Dony Ellis ([address] [facility] office)  Jojo Collier, [address] (Clinical Practice Manager)  Don Khan [facility] (Clinical Pharmacist)     Office phone number: [phone]    If you need to get in right away due to illness, please be advised we have \"Same Day\" appointments available Monday-Friday. Please call us at [phone] option #3 to schedule your \"Same Day\" appointment. Schedule a Vaccine  When you qualify to receive the vaccine, call the [facility]) COVID-19 Vaccination Hotline to schedule your appointment or to get additional information about the [facility]) locations which are offering the COVID-19 vaccine. To be 94% effective, it's important that you receive two doses of one of the COVID-19 vaccines. -If you are receiving the Early Peter vaccine, your second shot will be scheduled as close to 21 days after the first shot as possible. -If you are receiving the Moderna vaccine, your second shot will be scheduled as close to 28 days after the first shot as possible. [facility]) COVID-19 Vaccination Hotline: [phone]    Links to [facility]) website and [facility] website:    [email or web address]    [email or web address]

## 2022-05-05 DIAGNOSIS — E78.00 PURE HYPERCHOLESTEROLEMIA: ICD-10-CM

## 2022-05-05 DIAGNOSIS — I10 ESSENTIAL HYPERTENSION: ICD-10-CM

## 2022-05-05 RX ORDER — LISINOPRIL 5 MG/1
5 TABLET ORAL DAILY
Qty: 30 TABLET | Refills: 0 | Status: SHIPPED | OUTPATIENT
Start: 2022-05-05 | End: 2022-06-03

## 2022-05-05 RX ORDER — ATORVASTATIN CALCIUM 40 MG/1
40 TABLET, FILM COATED ORAL DAILY
Qty: 30 TABLET | Refills: 0 | Status: SHIPPED | OUTPATIENT
Start: 2022-05-05 | End: 2022-06-03
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
## 2022-06-02 DIAGNOSIS — E78.00 PURE HYPERCHOLESTEROLEMIA: ICD-10-CM

## 2022-06-02 DIAGNOSIS — I10 ESSENTIAL HYPERTENSION: ICD-10-CM

## 2022-06-03 RX ORDER — ATORVASTATIN CALCIUM 40 MG/1
40 TABLET, FILM COATED ORAL DAILY
Qty: 30 TABLET | Refills: 0 | Status: SHIPPED | OUTPATIENT
Start: 2022-06-03 | End: 2022-06-07

## 2022-06-03 RX ORDER — LISINOPRIL 5 MG/1
5 TABLET ORAL DAILY
Qty: 30 TABLET | Refills: 0 | Status: SHIPPED | OUTPATIENT
Start: 2022-06-03 | End: 2022-06-28
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
## 2022-06-07 ENCOUNTER — OFFICE VISIT (OUTPATIENT)
Dept: FAMILY MEDICINE CLINIC | Age: 52
End: 2022-06-07
Payer: MEDICARE

## 2022-06-07 VITALS
OXYGEN SATURATION: 97 % | HEART RATE: 106 BPM | SYSTOLIC BLOOD PRESSURE: 120 MMHG | DIASTOLIC BLOOD PRESSURE: 84 MMHG | HEIGHT: 64 IN | BODY MASS INDEX: 21.99 KG/M2 | TEMPERATURE: 98.1 F | WEIGHT: 128.8 LBS

## 2022-06-07 DIAGNOSIS — I10 ESSENTIAL HYPERTENSION: Primary | ICD-10-CM

## 2022-06-07 DIAGNOSIS — Z12.11 SCREEN FOR COLON CANCER: ICD-10-CM

## 2022-06-07 DIAGNOSIS — E78.00 PURE HYPERCHOLESTEROLEMIA: ICD-10-CM

## 2022-06-07 PROCEDURE — 4004F PT TOBACCO SCREEN RCVD TLK: CPT | Performed by: STUDENT IN AN ORGANIZED HEALTH CARE EDUCATION/TRAINING PROGRAM

## 2022-06-07 PROCEDURE — G8427 DOCREV CUR MEDS BY ELIG CLIN: HCPCS | Performed by: STUDENT IN AN ORGANIZED HEALTH CARE EDUCATION/TRAINING PROGRAM

## 2022-06-07 PROCEDURE — 3017F COLORECTAL CA SCREEN DOC REV: CPT | Performed by: STUDENT IN AN ORGANIZED HEALTH CARE EDUCATION/TRAINING PROGRAM

## 2022-06-07 PROCEDURE — G8420 CALC BMI NORM PARAMETERS: HCPCS | Performed by: STUDENT IN AN ORGANIZED HEALTH CARE EDUCATION/TRAINING PROGRAM

## 2022-06-07 PROCEDURE — 99213 OFFICE O/P EST LOW 20 MIN: CPT | Performed by: STUDENT IN AN ORGANIZED HEALTH CARE EDUCATION/TRAINING PROGRAM

## 2022-06-07 RX ORDER — ATORVASTATIN CALCIUM 80 MG/1
80 TABLET, FILM COATED ORAL DAILY
Qty: 30 TABLET | Refills: 2 | Status: SHIPPED | OUTPATIENT
Start: 2022-06-07 | End: 2022-10-15

## 2022-06-07 ASSESSMENT — ENCOUNTER SYMPTOMS
DIARRHEA: 0
SHORTNESS OF BREATH: 0
NAUSEA: 0
VOMITING: 0
ABDOMINAL PAIN: 0

## 2022-06-07 NOTE — PROGRESS NOTES
I have reviewed and discussed aguirre elements of [address] [place] [address] with the resident including plan of care and follow up and agree with the care anabel plan. Past Medical History:   Diagnosis Date    Anxiety     [facility] disorder [facility]     Brain aneurysm 9/2015    Depression     Epilepsy ([facility] [facility]     NOT ON ANY MEDICATION    Essential hypertension 6/1/2018    Gastroesophageal reflux disease without esophagitis 1/27/2020    Headache     Hyperlipidemia     Liver disease     hep c    Manic depression (HCC)     Pancreatitis     Scoliosis     Seasonal allergies        Vitals:    06/07/22 1435   BP: 120/84   Pulse: (!) 106   Temp: 98.1 °F (36.7 °C)   SpO2: 97%      Diagnosis Orders   1. Essential hypertension     2. Pure hypercholesterolemia  atorvastatin (LIPITOR) 80 MG tablet   3.  Screen for colon cancer

## 2022-06-07 NOTE — PROGRESS NOTES
Subjective:    Larisa English is a 47 y.o. female with  has a past medical history of Anxiety, Bipolar disorder ([facility] Brain aneurysm, Depression, Epilepsy ([facility] [facility] Essential hypertension, Gastroesophageal reflux disease without esophagitis, Headache, Hyperlipidemia, Liver disease, Manic depression ([facility] [address] Pancreatitis, Scoliosis, and Seasonal allergies. Presented to the office today for:  Chief Complaint   Patient presents with    Diabetes     follow up    Results     patient would like to go over her labs on 04/05/22       HPI    62-year-old female here today to follow-up for lab results. Patient is doing well no acute concerns. Essential hypertension -blood pressures controlled on lisinopril. Asymptomatic. Continue current regimen. Labs reviewed. All questions answered. Will increase Lipitor to 80 mg. Review of Systems   Constitutional: Negative for chills and fever. Respiratory: Negative for shortness of breath. Cardiovascular: Negative for chest pain. Gastrointestinal: Negative for abdominal pain, diarrhea, nausea and vomiting. The patient has a   Family History   Problem Relation Age of Onset    Emphysema Mother     Seizures Sister     Lupus Sister        Objective:    /84 (Site: Left Upper Arm, Position: Sitting, Cuff Size: Medium Adult) Comment: manual  Pulse (!) 106   Temp 98.1 °F (36.7 °C)   Ht 5' 4.02\" (1.626 m)   Wt 128 lb 12.8 oz (58.4 kg)   SpO2 97%   BMI 22.10 kg/m²    BP Readings from Last 3 Encounters:   06/07/22 120/84   04/19/22 105/65   04/05/22 124/68       Physical Exam  Vitals reviewed. HENT:      Head: Normocephalic and atraumatic. Cardiovascular:      Rate and Rhythm: Normal rate and regular rhythm. Pulses: Normal pulses. Heart sounds: Normal heart sounds. Pulmonary:      Effort: Pulmonary effort is normal. No respiratory distress. Breath sounds: Normal breath sounds. No wheezing.    Abdominal:      Palpations: Abdomen is soft. Tenderness: There is no abdominal tenderness. There is no guarding. Skin:     General: Skin is warm and dry. Neurological:      General: No focal deficit present. Mental Status: She is alert and oriented to person, place, and time. Lab Results   Component Value Date    WBC 7.6 04/05/2022    HGB 14.4 04/05/2022    HCT 43.6 04/05/2022     04/05/2022    CHOL 380 (H) 04/05/2022    TRIG 281 (H) 04/05/2022    HDL 59 04/05/2022    ALT 16 04/05/2022    AST 19 04/05/2022     04/05/2022    K 4.5 04/05/2022    CL 97 (L) 04/05/2022    CREATININE 0.58 04/05/2022    BUN 9 04/05/2022    CO2 28 04/05/2022    TSH 1.20 04/05/2022    INR 1.0 08/07/2021    LABA1C 6.4 04/05/2022    LABMICR Can not be calculated 04/05/2022     Lab Results   Component Value Date    CALCIUM 10.1 04/05/2022     Lab Results   Component Value Date    LDLCALC 253 (A) 04/04/2014    LDLCHOLESTEROL 265 (H) 04/05/2022       Assessment and Plan:    1. Essential hypertension  - BP controlled  - continue lisinopril 5 mg PO daily  - lifestyle dietary counselling    2. Pure hypercholesterolemia  - atorvastatin (LIPITOR) 80 MG tablet; Take 1 tablet by mouth daily  Dispense: 30 tablet; Refill: 2    3. Screen for colon cancer  - will need [employer] however patient does not have home address at this time. Will try to ship [employer] to our office and have patient           Requested Prescriptions     Signed Prescriptions Disp Refills    atorvastatin (LIPITOR) 80 MG tablet 30 tablet 2     Sig: Take 1 tablet by mouth daily       Medications Discontinued During This Encounter   Medication Reason    docusate sodium (COLACE) 100 MG capsule LIST CLEANUP    atorvastatin (LIPITOR) 40 MG tablet        Sonja received counseling on the following healthy behaviors: nutrition, exercise and medication adherence    Discussed use,benefit, and side effects of prescribed medications. Barriers to medication compliance addressed. All patient questions answered. Pt voiced understanding. Return in about 3 months (around 9/7/2022). Disclaimer: Some orall of this note was transcribed using voice-recognition software. This may cause typographical errors occasionally. Although all effort is made to fix these errors, please do not hesitate to contact our office if there Manual Estevez concern with the understanding of this note.

## 2022-06-07 NOTE — PROGRESS NOTES
Diabetic visit information    BP Readings from Last 3 Encounters:   04/19/22 105/65   04/05/22 124/68   11/04/21 117/70       Hemoglobin A1C (%)   Date Value   04/05/2022 6.4   01/27/2020 5.4   06/01/2018 6.2     Microalb/Crt. Ratio (mcg/mg creat)   Date Value   04/05/2022 Can not be calculated     LDL Cholesterol (mg/dL)   Date Value   04/05/2022 265 (H)     LDL Calculated (mg/dL)   Date Value   04/04/2014 253 (A)               Have you changed or started any medications since your last visit including any over-the-counter medicines, vitamins, or herbal medicines? no   Have you stopped taking any of your medications? Is so, why? -  no  Are you having any side effects from any of your medications? - no    Have you seen any other physician or provider since your last visit?  no   Have you had any other diagnostic tests since your last visit? yes - Labs   Have you been seen in the emergency room and/or had an admission in a hospital since we last saw you?  no     Have you had your annual diabetic retinal (eye) exam? No   (ensure copy of exam is in the chart)    Have you had your routine dental cleaning in the past 6 months? no    Do you have an active MyChart account? If not, what are your barriers? Yes    Patient Care Team:  Mague Robison MD as PCP - General (Family Medicine)    Medical history Review  Past Medical, Family, and Social History reviewed and does not contribute to the patient presenting condition.     Health Maintenance   Topic Date Due    COVID-19 Vaccine (1) Never done    Hepatitis B vaccine (1 of 3 - Risk 3-dose series) Never done    Colorectal Cancer Screen  Never done    Pneumococcal 0-64 years Vaccine (2 - PCV) 11/14/2018    Shingles vaccine (1 of 2) Never done    Diabetic foot exam  06/01/2019    Breast cancer screen  01/11/2021    Diabetic retinal exam  02/18/2021    Flu vaccine (Season Ended) 09/01/2022    A1C test (Diabetic or Prediabetic)  04/05/2023    Diabetic microalbuminuria test  04/05/2023    Lipids  04/05/2023    Depression Monitoring  04/19/2023    DTaP/Tdap/Td vaccine (2 - Td or Tdap) 11/14/2027    Hepatitis C screen  Completed    HIV screen  Completed    Hepatitis A vaccine  Aged Out    Hib vaccine  Aged Out    Meningococcal (ACWY) vaccine  Aged Out

## 2022-06-28 DIAGNOSIS — F17.200 SMOKER: ICD-10-CM

## 2022-06-28 DIAGNOSIS — E78.00 PURE HYPERCHOLESTEROLEMIA: ICD-10-CM

## 2022-06-28 DIAGNOSIS — R05.3 CHRONIC COUGH: ICD-10-CM

## 2022-06-28 DIAGNOSIS — I10 ESSENTIAL HYPERTENSION: ICD-10-CM

## 2022-06-28 RX ORDER — TIOTROPIUM BROMIDE 18 UG/1
18 CAPSULE ORAL; RESPIRATORY (INHALATION) DAILY
Qty: 30 CAPSULE | Refills: 5 | Status: SHIPPED | OUTPATIENT
Start: 2022-06-28

## 2022-06-28 RX ORDER — ATORVASTATIN CALCIUM 40 MG/1
40 TABLET, FILM COATED ORAL DAILY
Qty: 30 TABLET | Refills: 5 | OUTPATIENT
Start: 2022-06-28

## 2022-06-28 RX ORDER — LISINOPRIL 5 MG/1
5 TABLET ORAL DAILY
Qty: 30 TABLET | Refills: 5 | Status: SHIPPED | OUTPATIENT
Start: 2022-06-28

## 2022-06-28 NOTE — TELEPHONE ENCOUNTER
E-scribe request for med refills. Please review and e-scribe if applicable. Last Visit Date:  6/7/22  Next Visit Date:  Visit date not found    Hemoglobin A1C (%)   Date Value   04/05/2022 6.4   01/27/2020 5.4   06/01/2018 6.2             ( goal A1C is < 7)   Microalb/Crt.  Ratio (mcg/mg creat)   Date Value   04/05/2022 Can not be calculated     LDL Cholesterol (mg/dL)   Date Value   04/05/2022 265 (H)     LDL Calculated (mg/dL)   Date Value   04/04/2014 253 (A)       (goal LDL is <100)   AST (U/L)   Date Value   04/05/2022 19     ALT (U/L)   Date Value   04/05/2022 16     BUN (mg/dL)   Date Value   04/05/2022 9     BP Readings from Last 3 Encounters:   06/07/22 120/84   04/19/22 105/65   04/05/22 124/68          (goal 120/80)        Patient Active Problem List:     Bipolar disorder ([address]     Scoliosis     Depression     Hyperlipemia     Essential hypertension     Prediabetes     Gastroesophageal reflux disease without esophagitis     Vitamin D deficiency     Mild episode of recurrent major depressive disorder (HCC)     Type 2 diabetes mellitus without complication, without long-term current use of insulin ([address]     Accidental poisoning by carbon monoxide      ----Betty Sherman

## 2022-06-28 NOTE — TELEPHONE ENCOUNTER
E-scribe request for med refills. Please review and e-scribe if applicable. Last Visit Date:  6/7/22  Next Visit Date:  6/28/2022    Hemoglobin A1C (%)   Date Value   04/05/2022 6.4   01/27/2020 5.4   06/01/2018 6.2             ( goal A1C is < 7)   Microalb/Crt.  Ratio (mcg/mg creat)   Date Value   04/05/2022 Can not be calculated     LDL Cholesterol (mg/dL)   Date Value   04/05/2022 265 (H)     LDL Calculated (mg/dL)   Date Value   04/04/2014 253 (A)       (goal LDL is <100)   AST (U/L)   Date Value   04/05/2022 19     ALT (U/L)   Date Value   04/05/2022 16     BUN (mg/dL)   Date Value   04/05/2022 9     BP Readings from Last 3 Encounters:   06/07/22 120/84   04/19/22 105/65   04/05/22 124/68          (goal 120/80)        Patient Active Problem List:     Bipolar disorder ([address]     Scoliosis     Depression     Hyperlipemia     Essential hypertension     Prediabetes     Gastroesophageal reflux disease without esophagitis     Vitamin D deficiency     Mild episode of recurrent major depressive disorder (HCC)     Type 2 diabetes mellitus without complication, without long-term current use of insulin ([address]     Accidental poisoning by carbon monoxide      ----Liu Garcia

## 2022-08-17 DIAGNOSIS — E11.9 TYPE 2 DIABETES MELLITUS WITHOUT COMPLICATION, WITHOUT LONG-TERM CURRENT USE OF INSULIN (HCC): ICD-10-CM

## 2022-08-17 RX ORDER — OMEPRAZOLE 20 MG/1
20 CAPSULE, DELAYED RELEASE ORAL DAILY
Qty: 30 CAPSULE | Refills: 3 | Status: SHIPPED | OUTPATIENT
Start: 2022-08-17

## 2022-08-17 NOTE — TELEPHONE ENCOUNTER
E-scribe request for med refill. Please review and e-scribe if applicable. Last Visit Date:  06/07/2022  Next Visit Date:  Visit date not found    Hemoglobin A1C (%)   Date Value   04/05/2022 6.4   01/27/2020 5.4   06/01/2018 6.2             ( goal A1C is < 7)   Microalb/Crt.  Ratio (mcg/mg creat)   Date Value   04/05/2022 Can not be calculated     LDL Cholesterol (mg/dL)   Date Value   04/05/2022 265 (H)     LDL Calculated (mg/dL)   Date Value   04/04/2014 253 (A)       (goal LDL is <100)   AST (U/L)   Date Value   04/05/2022 19     ALT (U/L)   Date Value   04/05/2022 16     BUN (mg/dL)   Date Value   04/05/2022 9     BP Readings from Last 3 Encounters:   06/07/22 120/84   04/19/22 105/65   04/05/22 124/68          (goal 120/80)        Patient Active Problem List:     Bipolar disorder ([address]     Scoliosis     Depression     Hyperlipemia     Essential hypertension     Prediabetes     Gastroesophageal reflux disease without esophagitis     Vitamin D deficiency     Mild episode of recurrent major depressive disorder (HCC)     Type 2 diabetes mellitus without complication, without long-term current use of insulin ([address]     Accidental poisoning by carbon monoxide      ----Sangita Bryan

## 2022-08-18 DIAGNOSIS — E11.9 TYPE 2 DIABETES MELLITUS WITHOUT COMPLICATION, WITHOUT LONG-TERM CURRENT USE OF INSULIN (HCC): ICD-10-CM

## 2022-08-18 RX ORDER — OMEPRAZOLE 20 MG/1
20 CAPSULE, DELAYED RELEASE ORAL DAILY
Qty: 30 CAPSULE | Refills: 3 | OUTPATIENT
Start: 2022-08-18

## 2022-10-09 ENCOUNTER — HOSPITAL ENCOUNTER (EMERGENCY)
Age: 52
Discharge: HOME | End: 2022-10-09
Attending: EMERGENCY MEDICINE
Payer: MEDICARE

## 2022-10-09 VITALS
TEMPERATURE: 97.8 F | OXYGEN SATURATION: 96 % | SYSTOLIC BLOOD PRESSURE: 151 MMHG | RESPIRATION RATE: 16 BRPM | DIASTOLIC BLOOD PRESSURE: 88 MMHG | HEART RATE: 87 BPM

## 2022-10-09 DIAGNOSIS — H10.022 PINK EYE DISEASE OF LEFT EYE: Primary | ICD-10-CM

## 2022-10-09 PROCEDURE — 99283 EMERGENCY DEPT VISIT LOW MDM: CPT

## 2022-10-09 RX ORDER — GENTAMICIN SULFATE 3 MG/ML
1 SOLUTION/ DROPS OPHTHALMIC EVERY 4 HOURS
Qty: 15 ML | Refills: 0 | Status: SHIPPED | OUTPATIENT
Start: 2022-10-09 | End: 2022-10-19

## 2022-10-09 RX ORDER — GLYCERIN .002; .002; .01 MG/MG; MG/MG; MG/MG
1 SOLUTION/ DROPS OPHTHALMIC 2 TIMES DAILY
Qty: 15 ML | Refills: 0 | Status: SHIPPED | OUTPATIENT
Start: 2022-10-09

## 2022-10-09 ASSESSMENT — ENCOUNTER SYMPTOMS
NAUSEA: 0
TROUBLE SWALLOWING: 0
BACK PAIN: 0
DIARRHEA: 0
VOMITING: 0
COUGH: 0
EYE REDNESS: 1
EYE DISCHARGE: 1
VOICE CHANGE: 0
ABDOMINAL DISTENTION: 0
APNEA: 0
PHOTOPHOBIA: 0

## 2022-10-09 ASSESSMENT — PAIN DESCRIPTION - LOCATION: LOCATION: EYE

## 2022-10-09 ASSESSMENT — PAIN SCALES - GENERAL: PAINLEVEL_OUTOF10: 10

## 2022-10-09 NOTE — ED PROVIDER NOTES
[facility]  ED     Emergency Department     Faculty Attestation    I performed a history and physical examination of the patient and discussed management with the resident. I reviewed the residents note and agree with the documented findings and plan of care. Any areas of disagreement are noted on the chart. I was personally present for the key portions of any procedures. I have documented in the chart those procedures where I was not present during the key portions. I have reviewed the emergency nurses triage note. I agree with the chief complaint, past medical history, past surgical history, allergies, medications, social and family history as documented unless otherwise noted below. For Physician Assistant/ Nurse Practitioner cases/documentation I have personally evaluated this patient and have completed at least one if not all key elements of the E/M (history, physical exam, and MDM). Additional findings are as noted. Presents with left eye irritation that she has had for 2 days. She denies any injuries. She denies any changes in vision. She says she has had a lot of nasal congestion and cough as well. She denies any difficulty with eye movement. Patient does wear eyeglasses but does not wear contact lenses. On exam, patient is sitting in a chair and appears well. There is some injection to the left conjunctiva. Extraocular muscles are intact. There is no periorbital erythema or edema. No foreign bodies are seen on exam.  We will treat with antibiotic eyedrops and patient instructed to return if symptoms worsen over the next day or 2.       Garrison Callejas MD  Attending Emergency  Physician            Funmilayo Pope MD  10/09/22 [id number]

## 2022-10-09 NOTE — ED NOTES
Pt presents to the ED c/o left eye pain, redness and swelling x 3 days. Pt denies any injury or blurred vision. Pt A&O x 4, does not appear in acute distress, RR even and unlabored, resting comfortably in a chair with eyes open and call light in reach. Vital signs obtained, medical hx and allergies reviewed with pt.  Initial assessment performed by physician, Marky Albright will carry out initial orders/tasks and reassess pt.         Betty Mohr LPN  64/61/86 [id number]

## 2022-10-09 NOTE — ED PROVIDER NOTES
[address]  ED  Emergency Department Encounter  Emergency Medicine Resident     Pt Name: Meghan Winters  GSZ:[id number]  [place] 1970  Date of evaluation: 10/9/22  PCP:  Jere Sever, [facility]       Chief Complaint   Patient presents with    Eye Problem     Left eye for a few days; not getting any better        HISTORY OF PRESENT ILLNESS  (Location/Symptom, Timing/Onset, Context/Setting, Quality, Duration, ModifyingFactors, Severity.)      Meghan Winters is a 47 y.o. female presents to the emergency department for evaluation of left eye pain and redness. Patient endorses that the symptoms been ongoing for the proximately the last 3 days and have not gotten any better. Patient is a thought that she may have something in her eye as it was normally mildly red but now is waking up consistently with a great deal of goop in her eye being sealed shut. Patient denies any recent illnesses, sicknesses, chest pain or shortness of breath. Patient endorses a mild bit of pain when she moves her eyes over it is superior and has no effect on her vision. PAST MEDICAL / SURGICAL / SOCIAL /FAMILY HISTORY      has a past medical history of Anxiety, Bipolar disorder ([address] Brain aneurysm, Depression, Epilepsy ([address] Essential hypertension, Gastroesophageal reflux disease without esophagitis, Headache, Hyperlipidemia, Liver disease, Manic depression ([address] Pancreatitis, Scoliosis, and Seasonal allergies. No other pertinent PMH on review with patient/guardian. has a past surgical history that includes hernia repair; Tonsillectomy; other surgical history (09/22/2015); Hysterectomy; hc picc power 5f triple (9/23/2015); and brain surgery. No other pertinent PSH on review with patient/guardian.   Social History     Socioeconomic History    Marital status: Single     Spouse name: Not on file    Number of children: Not on file    Years of education: Not on file    Highest education level: Not on file   Occupational History    Not on file   Tobacco Use    Smoking status: Every Day     Packs/day: 0.50     Years: 40.00     Pack years: 20.00     Types: Cigarettes     Last attempt to quit: 2015     Years since quittin.0    Smokeless tobacco: Never   Substance and Sexual Activity    Alcohol use: Yes     Alcohol/week: 3.0 standard drinks     Types: 3 Cans of beer per week     Comment: 3 cans per week     Drug use: Yes     Types: Marijuana Seabron Barban)    Sexual activity: Never   Other Topics Concern    Not on file   Social History Narrative    Not on file     Social Determinants of Health     Financial Resource Strain: Not on file   Food Insecurity: Not on file   Transportation Needs: Not on file   Physical Activity: Not on file   Stress: Not on file   Social Connections: Not on file   Intimate Partner Violence: Not on file   Housing Stability: Not on file       I counseled the patient against using tobacco products. Family History   Problem Relation Age of Onset    Emphysema Mother     Seizures Sister     Lupus Sister      No other pertinent FamHx on review with patient/guardian. Allergies:  Benadryl [diphenhydramine], Buspar [buspirone], Codeine, Flexeril [cyclobenzaprine], Ibuprofen, Lamotrigine, Paxil [paroxetine hcl], Pcn [penicillins], Simvastatin, and Zoloft [sertraline hcl]    Home Medications:  Prior to Admission medications    Medication Sig Start Date End Date Taking?  Authorizing Provider   omeprazole (PRILOSEC) 20 MG delayed release capsule TAKE 1 CAPSULE BY MOUTH DAILY 22   Sanford Hernandez MD   metFORMIN (GLUCOPHAGE) 500 MG tablet TAKE 1 TABLET BY MOUTH TWICE A DAY WITH MEALS 22   Sanford Hernandez MD   SPIRIVA HANDIHALER 18 MCG inhalation capsule INHALE 1 CAPSULE INTO THE LUNGS DAILY 22   Chace Jimenez MD   lisinopril (PRINIVIL;ZESTRIL) 5 MG tablet TAKE 1 TABLET BY MOUTH DAILY 22   Chace Jimenez MD   atorvastatin (LIPITOR) 80 MG tablet Take 1 tablet by mouth daily 6/7/22   Mandie Perry MD   Multiple Vitamins-Minerals (MULTIVITAMIN WITH MINERALS) tablet Take 1 tablet by mouth daily 4/19/22   Cletus Olszewski, MD   albuterol sulfate  (90 Base) MCG/ACT inhaler Inhale 2 puffs into the lungs every 6 hours as needed for Wheezing 4/19/22   Cletus Olszewski, MD   Pharmacist Choice Lancets MISC USE ONCE A DAY 5/13/20   Baljinder Flower MD   promethazine (PHENERGAN) 25 MG tablet Take 12.5-25 mg by mouth 11/24/18   Historical Provider, MD   levomilnacipran (FETZIMA) 40 MG CP24 extended release capsule Take 40 mg by mouth    Historical Provider, MD   blood glucose monitor strips 1 each by In Vitro route daily As needed. Patient not taking: Reported on 6/7/2022 7/3/19   Sanaz Cardoso MD   sodium chloride (OCEAN) 0.65 % nasal spray 1 spray by Nasal route as needed for Congestion 1/31/19   Sanaz Cardoso MD   Alcohol Swabs (ALCOHOL PADS) 70 % PADS 1 Units by Does not apply route daily 11/6/18   Aysha Cohen MD   Blood Pressure Monitoring (BLOOD PRESSURE KIT) JOE Check BP daily  Patient not taking: Reported on 6/7/2022 4/30/18   Nancy Mejia MD   DULoxetine (CYMBALTA) 60 MG extended release capsule Take 60 mg by mouth 2 times daily 1/18/18   Historical Provider, MD   QUEtiapine (SEROQUEL XR) 200 MG extended release tablet Take 200 mg by mouth daily 1/18/18   Historical Provider, MD   hydrOXYzine (VISTARIL) 25 MG capsule Take 25 mg by mouth 3 times daily     Historical Provider, MD       REVIEW OF SYSTEMS    (2-9 systems for level 4, 10 ormore for level 5)      Review of Systems   Constitutional:  Negative for activity change, appetite change and fever. HENT:  Negative for congestion, tinnitus, trouble swallowing and voice change. Eyes:  Positive for discharge, redness and visual disturbance. Negative for photophobia. Respiratory:  Negative for apnea and cough. Cardiovascular:  Negative for chest pain.    Gastrointestinal:  Negative for abdominal distention, diarrhea, nausea and vomiting. Endocrine: Negative for cold intolerance and heat intolerance. Genitourinary:  Negative for dysuria and urgency. Musculoskeletal:  Negative for arthralgias, back pain and gait problem. Skin: Negative. Neurological: Negative. Psychiatric/Behavioral: Negative. PHYSICAL EXAM   (up to 7 for level 4, 8 or more for level 5)      INITIAL VITALS:   BP (!) 151/88   Pulse 87   Temp 97.8 °F (36.6 °C) (Oral)   Resp 16   SpO2 96%     Physical Exam  Vitals reviewed. Constitutional:       Appearance: Normal appearance. HENT:      Head: Normocephalic and atraumatic. Nose: Nose normal.      Mouth/Throat:      Mouth: Mucous membranes are moist.      Pharynx: Oropharynx is clear. Eyes:      Comments: EMOI intact, PERRLA, left conjunctival redness, no photophobia, no obvious foreign bodies, and nothing of the lids   Cardiovascular:      Rate and Rhythm: Normal rate and regular rhythm. Pulses: Normal pulses. Heart sounds: Normal heart sounds. Pulmonary:      Effort: Pulmonary effort is normal.      Breath sounds: Normal breath sounds. Abdominal:      General: Abdomen is flat. Palpations: Abdomen is soft. Musculoskeletal:         General: Normal range of motion. Cervical back: Neck supple. Skin:     General: Skin is warm and dry. Capillary Refill: Capillary refill takes less than 2 seconds. Neurological:      General: No focal deficit present. Mental Status: She is alert. Mental status is at baseline. Psychiatric:         Mood and Affect: Mood normal.       DIFFERENTIAL  DIAGNOSIS     DDX: Viral conjunctivitis, bacterial conjunctivitis, gonorrhea    PLAN (LABS / IMAGING / EKG):  No orders of the defined types were placed in this encounter. MEDICATIONS ORDERED:  No orders of the defined types were placed in this encounter.           DIAGNOSTIC RESULTS / EMERGENCY DEPARTMENT COURSE / MDM     LABS:  No results found for this visit on 10/09/22. IMPRESSION/MDM/ED COURSE:  47 y.o. female presented with pink discoloration and discharge from the left eye that for approximately 3 days. Likely pinkeye. Will discharge with gentamicin drops for bacterial coverage, have the patient return within 3 days if symptoms not start to improve. Patient/Guardian requesting discharge. Patient/Guardian was given written and verbal instructions prior to discharge. Patient/Guardian understood and agreed. Patient/Guardian had no further questions. RADIOLOGY:  No orders to display         EKG  None    All EKG's are interpreted by the Emergency Department Physician who either signs or Co-signs this chart in the absence of a cardiologist.      PROCEDURES:  None    CONSULTS:  None        FINAL IMPRESSION      1. Pink eye disease of left eye          DISPOSITION / PLAN       DISPOSITION Decision To Discharge 10/09/2022 11:26:18 AM        PATIENT REFERREDTO:  No follow-up provider specified.     DISCHARGE MEDICATIONS:  New Prescriptions    No medications on file       Lennox Burn MD  PGY 3  Resident Physician Emergency Medicine  10/09/22 11:34 AM        (Please note that portions of this note were completed with a voice recognition program.Efforts were made to edit the dictations but occasionally words are mis-transcribed.)        Lennox Burn, MD  Resident  10/09/22 [id number]

## 2022-10-09 NOTE — DISCHARGE INSTRUCTIONS
Take your medication as indicated and prescribed. If you are given an antibiotic, then make sure you get the prescription filled and take the antibiotics until finished. Ketotifen 0.5 solution (Zaditor) or naphazoline (Naphcon-A) along with diphenhydramine (Benadryl) can be used to help with any itching. For pain use ibuprofen (Motrin / Advil) or acetaminophen (Tylenol), unless prescribed medications that have acetaminophen in it. You can take over the counter acetaminophen tablets (1 - 2 tablets of the 500-mg strength every 6 hours) or ibuprofen tablets (2 tablets every 4 hours). If you wear contacts - make sure that you throw them away. Wear glasses for 2 weeks after the redness is all gone. PLEASE RETURN TO THE EMERGENCY DEPARTMENT IMMEDIATELY for worsening symptoms, swelling or drainage from the eye, the white of your eye turns red, inability to see, or if you develop any concerning symptoms such as: high fever not relieved by acetaminophen (Tylenol) and/or ibuprofen (Motrin / Advil), chills, shortness of breath, chest pain, feeling of your heart fluttering or racing, persistent nausea and/or vomiting, vomiting up blood, blood in your stool, numbness, loss of consciousness, weakness or tingling in the arms or legs or change in color of the extremities, changes in mental status, persistent headache, blurry vision, loss of bladder / bowel control, unable to follow up with your physician, or other any other care or concern.

## 2022-10-14 DIAGNOSIS — E78.00 PURE HYPERCHOLESTEROLEMIA: ICD-10-CM

## 2022-10-14 NOTE — TELEPHONE ENCOUNTER
Last visit: 6/7/22  Last Med refill: 8/7/22  Does patient have enough medication for 72 hours: No:     Next Visit Date:  No future appointments. Health Maintenance   Topic Date Due    COVID-19 Vaccine (1) Never done    Hepatitis B vaccine (1 of 3 - Risk 3-dose series) Never done    Colorectal Cancer Screen  Never done    Shingles vaccine (1 of 2) Never done    Low dose CT lung screening  Never done    Diabetic foot exam  06/01/2019    Breast cancer screen  01/11/2021    Diabetic retinal exam  02/18/2021    Flu vaccine (1) 08/01/2022    A1C test (Diabetic or Prediabetic)  04/05/2023    Diabetic microalbuminuria test  04/05/2023    Lipids  04/05/2023    Depression Monitoring  04/19/2023    DTaP/Tdap/Td vaccine (2 - Td or Tdap) 11/14/2027    Pneumococcal 0-64 years Vaccine  Completed    Hepatitis C screen  Completed    HIV screen  Completed    Hepatitis A vaccine  Aged Out    Hib vaccine  Aged Out    Meningococcal (ACWY) vaccine  Aged Out       Hemoglobin A1C (%)   Date Value   04/05/2022 6.4   01/27/2020 5.4   06/01/2018 6.2             ( goal A1C is < 7)   Microalb/Crt.  Ratio (mcg/mg creat)   Date Value   04/05/2022 Can not be calculated     LDL Cholesterol (mg/dL)   Date Value   04/05/2022 265 (H)   06/01/2018 141 (H)     LDL Calculated (mg/dL)   Date Value   04/04/2014 253 (A)       (goal LDL is <100)   AST (U/L)   Date Value   04/05/2022 19     ALT (U/L)   Date Value   04/05/2022 16     BUN (mg/dL)   Date Value   04/05/2022 9     BP Readings from Last 3 Encounters:   10/09/22 (!) 151/88   06/07/22 120/84   04/19/22 105/65          (goal 120/80)    All Future Testing planned in [facility]  Lab Frequency Next Occurrence   Comprehensive Metabolic Panel Once 62/42/2054   TSH without Reflex Once 11/05/2022   T4, Free Once 11/05/2022   Vitamin D 25 Hydroxy Once 11/05/2022   Comprehensive Metabolic Panel Once 17/05/3968   Lipid Panel Once 11/05/2022   DEBBY DIGITAL SCREEN W OR WO CAD BILATERAL Once 04/05/2022   Full PFT Study With Bronchodilator Once 04/19/2022               Patient Active Problem List:     Bipolar disorder ([facility]     Scoliosis     Depression     Hyperlipemia     Essential hypertension     Prediabetes     Gastroesophageal reflux disease without esophagitis     Vitamin D deficiency     Mild episode of recurrent major depressive disorder ([facility]     Type 2 diabetes mellitus without complication, without long-term current use of insulin ([facility]     Accidental poisoning by carbon monoxide

## 2022-10-15 RX ORDER — ATORVASTATIN CALCIUM 80 MG/1
80 TABLET, FILM COATED ORAL DAILY
Qty: 30 TABLET | Refills: 2 | Status: SHIPPED | OUTPATIENT
Start: 2022-10-15

## 2022-11-22 ENCOUNTER — HOSPITAL ENCOUNTER (EMERGENCY)
Age: 52
Discharge: HOME | End: 2022-11-22
Attending: EMERGENCY MEDICINE
Payer: MEDICARE

## 2022-11-22 VITALS
RESPIRATION RATE: 16 BRPM | TEMPERATURE: 98.4 F | OXYGEN SATURATION: 97 % | DIASTOLIC BLOOD PRESSURE: 97 MMHG | HEART RATE: 94 BPM | SYSTOLIC BLOOD PRESSURE: 131 MMHG

## 2022-11-22 DIAGNOSIS — M79.675 PAIN AROUND TOENAIL, LEFT FOOT: Primary | ICD-10-CM

## 2022-11-22 PROCEDURE — 6370000000 HC RX 637 (ALT 250 FOR IP)

## 2022-11-22 PROCEDURE — 99283 EMERGENCY DEPT VISIT LOW MDM: CPT

## 2022-11-22 RX ORDER — ACETAMINOPHEN 500 MG
1000 TABLET ORAL ONCE
Status: COMPLETED | OUTPATIENT
Start: 2022-11-22 | End: 2022-11-22

## 2022-11-22 RX ORDER — ACETAMINOPHEN 500 MG
500 TABLET ORAL EVERY 6 HOURS PRN
Qty: 120 TABLET | Refills: 0 | Status: SHIPPED | OUTPATIENT
Start: 2022-11-22

## 2022-11-22 RX ORDER — DOXYCYCLINE HYCLATE 100 MG
100 TABLET ORAL 2 TIMES DAILY
Qty: 14 TABLET | Refills: 0 | Status: SHIPPED | OUTPATIENT
Start: 2022-11-22 | End: 2022-11-29

## 2022-11-22 RX ORDER — DOXYCYCLINE HYCLATE 100 MG
100 TABLET ORAL ONCE
Status: COMPLETED | OUTPATIENT
Start: 2022-11-22 | End: 2022-11-22

## 2022-11-22 RX ADMIN — ACETAMINOPHEN 1000 MG: 500 TABLET ORAL at 16:49

## 2022-11-22 RX ADMIN — DOXYCYCLINE HYCLATE 100 MG: 100 TABLET, COATED ORAL at 17:20

## 2022-11-22 ASSESSMENT — PAIN SCALES - GENERAL
PAINLEVEL_OUTOF10: 10
PAINLEVEL_OUTOF10: 8

## 2022-11-22 ASSESSMENT — PAIN - FUNCTIONAL ASSESSMENT: PAIN_FUNCTIONAL_ASSESSMENT: 0-10

## 2022-11-22 ASSESSMENT — PAIN DESCRIPTION - LOCATION: LOCATION: TOE (COMMENT WHICH ONE)

## 2022-11-22 NOTE — ED PROVIDER NOTES
[facility] ED  Emergency Department Encounter  Emergency Medicine Resident     Pt Name:Sonja Singh  MRN: [id number]  [place] 1970  Date of evaluation: 22  PCP:  Jocelyn Moncada DO      CHIEF COMPLAINT       No chief complaint on file. HISTORY OF PRESENT ILLNESS  (Location/Symptom, Timing/Onset, Context/Setting, Quality, Duration, Modifying Factors, Severity.)      Ritika Rivera is a 47 y.o. female who presents with left big toe pain for the past 2 weeks. Patient states the pain is under her nail. It is a throbbing pain. She rates it a 10 out of 10. She has not taken anything for the pain. Patient states it hurts to walk and can barely wear shoes due to the pain. Denies dropping anything on her big toe or hitting it against something. Denies any fevers or pustular drainage coming from the nail. No history of gout. PAST MEDICAL / SURGICAL / SOCIAL / FAMILY HISTORY      has a past medical history of Anxiety, Bipolar disorder ([address] Brain aneurysm, Depression, Epilepsy ([address] Essential hypertension, Gastroesophageal reflux disease without esophagitis, Headache, Hyperlipidemia, Liver disease, Manic depression ([address] Pancreatitis, Scoliosis, and Seasonal allergies. has a past surgical history that includes hernia repair; Tonsillectomy; other surgical history (2015); Hysterectomy; hc picc power 5f triple (2015); and brain surgery. Social History     Socioeconomic History    Marital status: Single     Spouse name: Not on file    Number of children: Not on file    Years of education: Not on file    Highest education level: Not on file   Occupational History    Not on file   Tobacco Use    Smoking status: Every Day     Packs/day: 0.50     Years: 40.00     Pack years: 20.00     Types: Cigarettes     Last attempt to quit: 2015     Years since quittin.1    Smokeless tobacco: Never   Substance and Sexual Activity    Alcohol use:  Yes Alcohol/week: 3.0 standard drinks     Types: 3 Cans of beer per week     Comment: 3 cans per week     Drug use: Yes     Types: Marijuana Geri David)    Sexual activity: Never   Other Topics Concern    Not on file   Social History Narrative    Not on file     Social Determinants of Health     Financial Resource Strain: Not on file   Food Insecurity: Not on file   Transportation Needs: Not on file   Physical Activity: Not on file   Stress: Not on file   Social Connections: Not on file   Intimate Partner Violence: Not on file   Housing Stability: Not on file       Family History   Problem Relation Age of Onset    Emphysema Mother     Seizures Sister     Lupus Sister        Allergies:  Benadryl [diphenhydramine], Buspar [buspirone], Codeine, Flexeril [cyclobenzaprine], Ibuprofen, Lamotrigine, Paxil [paroxetine hcl], Pcn [penicillins], Simvastatin, and Zoloft [sertraline hcl]    Home Medications:  Prior to Admission medications    Medication Sig Start Date End Date Taking?  Authorizing Provider   doxycycline hyclate (VIBRA-TABS) 100 MG tablet Take 1 tablet by mouth 2 times daily for 7 days 11/22/22 11/29/22 Yes Leo Payne MD   acetaminophen (TYLENOL) 500 MG tablet Take 1 tablet by mouth every 6 hours as needed for Pain 11/22/22  Yes Leo Payne MD   atorvastatin (LIPITOR) 80 MG tablet TAKE 1 TABLET BY MOUTH DAILY 10/15/22   Madeleine Willson MD   glycerin-hypromellose- (ARTIFICIAL TEARS) 0.2-0.2-1 % SOLN opthalmic solution Place 1 drop into the left eye 2 times daily 10/9/22   Benson Farias MD   omeprazole (PRILOSEC) 20 MG delayed release capsule TAKE 1 CAPSULE BY MOUTH DAILY 8/17/22   Tish Sigala MD   metFORMIN (GLUCOPHAGE) 500 MG tablet TAKE 1 TABLET BY MOUTH TWICE A DAY WITH MEALS 8/17/22   Tish Sigala MD   SPIRIVA HANDIHALER 18 MCG inhalation capsule INHALE 1 CAPSULE INTO THE LUNGS DAILY 6/28/22   rTav Escobar MD   lisinopril (PRINIVIL;ZESTRIL) 5 MG tablet TAKE 1 TABLET BY MOUTH DAILY 6/28/22   Huey Quant Clyde Baumgarten, MD   Multiple Vitamins-Minerals (MULTIVITAMIN WITH MINERALS) tablet Take 1 tablet by mouth daily 4/19/22   Sarah Mayorga MD   albuterol sulfate  (90 Base) MCG/ACT inhaler Inhale 2 puffs into the lungs every 6 hours as needed for Wheezing 4/19/22   Sarah Mayorga MD   Pharmacist Choice Lancets MISC USE ONCE A DAY 5/13/20   Shamar Joyce MD   promethazine (PHENERGAN) 25 MG tablet Take 12.5-25 mg by mouth 11/24/18   Historical Provider, MD   levomilnacipran (FETZIMA) 40 MG CP24 extended release capsule Take 40 mg by mouth    Historical Provider, MD   blood glucose monitor strips 1 each by In Vitro route daily As needed. Patient not taking: Reported on 6/7/2022 7/3/19   Efra Doe MD   sodium chloride (OCEAN) 0.65 % nasal spray 1 spray by Nasal route as needed for Congestion 1/31/19   Efra Doe MD   Alcohol Swabs (ALCOHOL PADS) 70 % PADS 1 Units by Does not apply route daily 11/6/18   Siena White MD   Blood Pressure Monitoring (BLOOD PRESSURE KIT) JOE Check BP daily  Patient not taking: Reported on 6/7/2022 4/30/18   Nery Herrera MD   DULoxetine (CYMBALTA) 60 MG extended release capsule Take 60 mg by mouth 2 times daily 1/18/18   Historical Provider, MD   QUEtiapine (SEROQUEL XR) 200 MG extended release tablet Take 200 mg by mouth daily 1/18/18   Historical Provider, MD   hydrOXYzine (VISTARIL) 25 MG capsule Take 25 mg by mouth 3 times daily     Historical Provider, MD       REVIEW OF SYSTEMS    (2-9 systems for level 4, 10 or more for level 5)      Review of Systems   Constitutional:  Negative for fever. Musculoskeletal:  Negative for joint swelling. Positive for left big toenail pain. Skin:  Negative for rash. Neurological:  Negative for weakness and numbness. Hematological:  Does not bruise/bleed easily.      PHYSICAL EXAM   (up to 7 for level 4, 8 or more for level 5)      INITIAL VITALS:   BP (!) 131/97   Pulse 94   Temp 98.4 °F (36.9 °C) Resp 16   SpO2 97%     Physical Exam  Constitutional:       General: She is not in acute distress. HENT:      Head: Normocephalic and atraumatic. Right Ear: External ear normal.      Left Ear: External ear normal.      Nose: Nose normal.      Mouth/Throat:      Mouth: Mucous membranes are moist.   Eyes:      Extraocular Movements: Extraocular movements intact. Conjunctiva/sclera: Conjunctivae normal.   Cardiovascular:      Rate and Rhythm: Normal rate. Pulmonary:      Effort: Pulmonary effort is normal.   Musculoskeletal:         General: Tenderness present. No swelling. Normal range of motion. Cervical back: Normal range of motion. Comments: Tenderness to palpation of the left big toenail. Skin:     Findings: No bruising, erythema, lesion or rash. Comments: No warmth, erythema, or swelling of the left big toe. Patient has red nail polish on her toes, hard to appreciate any discoloration. No pustular drainage coming from the left big toenail. Neurological:      General: No focal deficit present. Mental Status: She is alert and oriented to person, place, and time. DIFFERENTIAL  DIAGNOSIS     PLAN (LABS / IMAGING / EKG):  No orders of the defined types were placed in this encounter.       MEDICATIONS ORDERED:  Orders Placed This Encounter   Medications    acetaminophen (TYLENOL) tablet 1,000 mg    doxycycline hyclate (VIBRA-TABS) tablet 100 mg     Order Specific Question:   Antimicrobial Indications     Answer:   Skin and Soft Tissue Infection    doxycycline hyclate (VIBRA-TABS) 100 MG tablet     Sig: Take 1 tablet by mouth 2 times daily for 7 days     Dispense:  14 tablet     Refill:  0    acetaminophen (TYLENOL) 500 MG tablet     Sig: Take 1 tablet by mouth every 6 hours as needed for Pain     Dispense:  120 tablet     Refill:  0       DDX: Onychomycosis, ingrown toenail, gout, toe fracture or sprain    DIAGNOSTIC RESULTS / EMERGENCY DEPARTMENT COURSE / MDM IMPRESSION: 29-year-old female who presents with big toenail pain for the last 2 weeks. Patient is not in acute distress. Vital signs stable. No erythema, warmth, or swelling of the left big toe. Tenderness to the toenail of the left big foot. Patient is wearing red nail polish so hard to appreciate any discoloration. No pustular drainage seen coming from the left big toenail. Concern for onychomycosis. Plan is for antibiotics and pain control. Referral to podiatry. Anticipate discharge. EMERGENCY DEPARTMENT COURSE:  ED Course as of 11/22/22 1819 Tue Nov 22, 2022 1759 Patient has allergy to penicillins. Will discharge on doxycycline instead of Keflex. [KR]   1818 Patient's pain is mildly improved with Tylenol. Encouraged her to schedule an appointment with podiatry. Patient verbalized understanding. Return precautions given. [KR]      ED Course User Index  [KR] Celi Smith MD       No notes of [facility] Admission Criteria type on file. CONSULTS:  None      FINAL IMPRESSION      1. Pain around toenail, left foot          DISPOSITION / PLAN     DISPOSITION Decision To Discharge 11/22/2022 05:06:43 PM      PATIENT REFERRED TO:  Ines Strong, [facility], [address]   [address]  [address]  [phone]    Schedule an appointment as soon as possible for a visit       [address]  [address]  [address] [facility]  [phone]  Schedule an appointment as soon as possible for a visit       DISCHARGE MEDICATIONS:  Discharge Medication List as of 11/22/2022  5:13 PM        START taking these medications    Details   doxycycline hyclate (VIBRA-TABS) 100 MG tablet Take 1 tablet by mouth 2 times daily for 7 days, Disp-14 tablet, R-0Print      acetaminophen (TYLENOL) 500 MG tablet Take 1 tablet by mouth every 6 hours as needed for Pain, Disp-120 tablet, R-0Print             Celi Smith MD  Emergency Medicine Resident    (Please note that portions of thisnote were completed with a voice recognition program.  Efforts were made to edit the dictations but occasionally words are mis-transcribed.)        Emily Ryder MD  Resident  11/22/22 [id number]

## 2022-11-22 NOTE — ED NOTES
Pt states  Great toe on left foot has been painful for a week  Pt able to ambulate on left foot  Pt denies any injury to left foot  Pt states tingling in left foot  Pt denies numbness in left foot       Lorrie Mcgovern LPN  27/21/09 [id number]

## 2022-12-01 ENCOUNTER — TELEPHONE (OUTPATIENT)
Dept: PODIATRY | Age: 52
End: 2022-12-01

## 2022-12-08 DIAGNOSIS — E11.9 TYPE 2 DIABETES MELLITUS WITHOUT COMPLICATION, WITHOUT LONG-TERM CURRENT USE OF INSULIN (HCC): ICD-10-CM

## 2022-12-09 RX ORDER — OMEPRAZOLE 20 MG/1
20 CAPSULE, DELAYED RELEASE ORAL DAILY
Qty: 30 CAPSULE | Refills: 3 | Status: SHIPPED | OUTPATIENT
Start: 2022-12-09

## 2022-12-09 NOTE — TELEPHONE ENCOUNTER
Last visit:   Last Med refill:   Does patient have enough medication for 72 hours: No:     Next Visit Date:  No future appointments. Health Maintenance   Topic Date Due    COVID-19 Vaccine (1) Never done    Hepatitis B vaccine (1 of 3 - Risk 3-dose series) Never done    Colorectal Cancer Screen  Never done    Shingles vaccine (1 of 2) Never done    Low dose CT lung screening  Never done    Diabetic foot exam  06/01/2019    Breast cancer screen  01/11/2021    Diabetic retinal exam  02/18/2021    Flu vaccine (1) 08/01/2022    A1C test (Diabetic or Prediabetic)  04/05/2023    Diabetic microalbuminuria test  04/05/2023    Lipids  04/05/2023    Depression Monitoring  04/19/2023    DTaP/Tdap/Td vaccine (2 - Td or Tdap) 11/14/2027    Pneumococcal 0-64 years Vaccine  Completed    Hepatitis C screen  Completed    HIV screen  Completed    Hepatitis A vaccine  Aged Out    Hib vaccine  Aged Out    Meningococcal (ACWY) vaccine  Aged Out       Hemoglobin A1C (%)   Date Value   04/05/2022 6.4   01/27/2020 5.4   06/01/2018 6.2             ( goal A1C is < 7)   Microalb/Crt.  Ratio (mcg/mg creat)   Date Value   04/05/2022 Can not be calculated     LDL Cholesterol (mg/dL)   Date Value   04/05/2022 265 (H)   06/01/2018 141 (H)     LDL Calculated (mg/dL)   Date Value   04/04/2014 253 (A)       (goal LDL is <100)   AST (U/L)   Date Value   04/05/2022 19     ALT (U/L)   Date Value   04/05/2022 16     BUN (mg/dL)   Date Value   04/05/2022 9     BP Readings from Last 3 Encounters:   11/22/22 (!) 131/97   10/09/22 (!) 151/88   06/07/22 120/84          (goal 120/80)    All Future Testing planned in [facility]  Lab Frequency Next Occurrence   DEBBY DIGITAL SCREEN W OR WO CAD BILATERAL Once 04/05/2022   Full PFT Study With Bronchodilator Once 04/19/2022               Patient Active Problem List:     Bipolar disorder ([address]     Scoliosis     Depression     Hyperlipemia     Essential hypertension     Prediabetes     Gastroesophageal reflux disease without esophagitis     Vitamin D deficiency     Mild episode of recurrent major depressive disorder (HCC)     Type 2 diabetes mellitus without complication, without long-term current use of insulin (HCC)     Accidental poisoning by carbon monoxide         Please address the medication refill and close the encounter. If I can be of assistance, please route to the applicable pool. Thank you.

## 2023-01-05 DIAGNOSIS — F17.200 SMOKER: ICD-10-CM

## 2023-01-05 DIAGNOSIS — R05.3 CHRONIC COUGH: ICD-10-CM

## 2023-01-05 DIAGNOSIS — I10 ESSENTIAL HYPERTENSION: ICD-10-CM

## 2023-01-06 RX ORDER — TIOTROPIUM BROMIDE 18 UG/1
18 CAPSULE ORAL; RESPIRATORY (INHALATION) DAILY
Qty: 30 CAPSULE | Refills: 0 | Status: SHIPPED | OUTPATIENT
Start: 2023-01-06

## 2023-01-06 RX ORDER — LISINOPRIL 5 MG/1
5 TABLET ORAL DAILY
Qty: 30 TABLET | Refills: 0 | Status: SHIPPED | OUTPATIENT
Start: 2023-01-06

## 2023-01-06 NOTE — TELEPHONE ENCOUNTER
E-scribe request for med refill. Please review and e-scribe if applicable. Last Visit Date:  06/07/2022  Next Visit Date:  1/9/2023    Hemoglobin A1C (%)   Date Value   04/05/2022 6.4   01/27/2020 5.4   06/01/2018 6.2             ( goal A1C is < 7)   Microalb/Crt.  Ratio (mcg/mg creat)   Date Value   04/05/2022 Can not be calculated     LDL Cholesterol (mg/dL)   Date Value   04/05/2022 265 (H)     LDL Calculated (mg/dL)   Date Value   04/04/2014 253 (A)       (goal LDL is <100)   AST (U/L)   Date Value   04/05/2022 19     ALT (U/L)   Date Value   04/05/2022 16     BUN (mg/dL)   Date Value   04/05/2022 9     BP Readings from Last 3 Encounters:   11/22/22 (!) 131/97   10/09/22 (!) 151/88   06/07/22 120/84          (goal 120/80)        Patient Active Problem List:     Bipolar disorder ([address]     Scoliosis     Depression     Hyperlipemia     Essential hypertension     Prediabetes     Gastroesophageal reflux disease without esophagitis     Vitamin D deficiency     Mild episode of recurrent major depressive disorder (HCC)     Type 2 diabetes mellitus without complication, without long-term current use of insulin ([address]     Accidental poisoning by carbon monoxide      ----Cathleen Neves

## 2023-01-09 DIAGNOSIS — E78.00 PURE HYPERCHOLESTEROLEMIA: ICD-10-CM

## 2023-01-09 RX ORDER — ATORVASTATIN CALCIUM 80 MG/1
80 TABLET, FILM COATED ORAL DAILY
Qty: 30 TABLET | Refills: 2 | Status: SHIPPED | OUTPATIENT
Start: 2023-01-09

## 2023-01-09 NOTE — TELEPHONE ENCOUNTER
E-scribe request for med refills. Please review and e-scribe if applicable. Last Visit Date:  6/7/22  Next Visit Date:  Visit date not found    Hemoglobin A1C (%)   Date Value   04/05/2022 6.4   01/27/2020 5.4   06/01/2018 6.2             ( goal A1C is < 7)   Microalb/Crt.  Ratio (mcg/mg creat)   Date Value   04/05/2022 Can not be calculated     LDL Cholesterol (mg/dL)   Date Value   04/05/2022 265 (H)     LDL Calculated (mg/dL)   Date Value   04/04/2014 253 (A)       (goal LDL is <100)   AST (U/L)   Date Value   04/05/2022 19     ALT (U/L)   Date Value   04/05/2022 16     BUN (mg/dL)   Date Value   04/05/2022 9     BP Readings from Last 3 Encounters:   11/22/22 (!) 131/97   10/09/22 (!) 151/88   06/07/22 120/84          (goal 120/80)        Patient Active Problem List:     Bipolar disorder ([address]     Scoliosis     Depression     Hyperlipemia     Essential hypertension     Prediabetes     Gastroesophageal reflux disease without esophagitis     Vitamin D deficiency     Mild episode of recurrent major depressive disorder (HCC)     Type 2 diabetes mellitus without complication, without long-term current use of insulin ([address]     Accidental poisoning by carbon monoxide      ----Jonathan Hyman

## 2023-02-06 DIAGNOSIS — F17.200 SMOKER: ICD-10-CM

## 2023-02-06 DIAGNOSIS — R05.3 CHRONIC COUGH: ICD-10-CM

## 2023-02-06 DIAGNOSIS — I10 ESSENTIAL HYPERTENSION: ICD-10-CM

## 2023-02-06 RX ORDER — TIOTROPIUM BROMIDE 18 UG/1
18 CAPSULE ORAL; RESPIRATORY (INHALATION) DAILY
Qty: 30 CAPSULE | Refills: 0 | Status: SHIPPED | OUTPATIENT
Start: 2023-02-06

## 2023-02-06 RX ORDER — LISINOPRIL 5 MG/1
5 TABLET ORAL DAILY
Qty: 30 TABLET | Refills: 0 | Status: SHIPPED | OUTPATIENT
Start: 2023-02-06

## 2023-02-06 NOTE — TELEPHONE ENCOUNTER
Spirvia handinhaler capsule pending refill        Health Maintenance   Topic Date Due    COVID-19 Vaccine (1) Never done    Hepatitis B vaccine (1 of 3 - Risk 3-dose series) Never done    Colorectal Cancer Screen  Never done    Shingles vaccine (1 of 2) Never done    Low dose CT lung screening  Never done    Diabetic foot exam  06/01/2019    Breast cancer screen  01/11/2021    Diabetic retinal exam  02/18/2021    Flu vaccine (1) 08/01/2022    A1C test (Diabetic or Prediabetic)  04/05/2023    Diabetic Alb to Cr ratio (uACR) test  04/05/2023    Lipids  04/05/2023    GFR test (Diabetes, CKD 3-4, OR last GFR 15-59)  04/05/2023    Depression Monitoring  04/19/2023    DTaP/Tdap/Td vaccine (2 - Td or Tdap) 11/14/2027    Pneumococcal 0-64 years Vaccine  Completed    Hepatitis C screen  Completed    HIV screen  Completed    Hepatitis A vaccine  Aged Out    Hib vaccine  Aged Out    Meningococcal (ACWY) vaccine  Aged Out             (applicable per patient's age: Cancer Screenings, Depression Screening, Fall Risk Screening, Immunizations)    Hemoglobin A1C (%)   Date Value   04/05/2022 6.4   01/27/2020 5.4   06/01/2018 6.2     Microalb/Crt. Ratio (mcg/mg creat)   Date Value   04/05/2022 Can not be calculated     LDL Cholesterol (mg/dL)   Date Value   04/05/2022 265 (H)     LDL Calculated (mg/dL)   Date Value   04/04/2014 253 (A)     AST (U/L)   Date Value   04/05/2022 19     ALT (U/L)   Date Value   04/05/2022 16     BUN (mg/dL)   Date Value   04/05/2022 9      (goal A1C is < 7)   (goal LDL is <100) need 30-50% reduction from baseline     BP Readings from Last 3 Encounters:   11/22/22 (!) 131/97   10/09/22 (!) 151/88   06/07/22 120/84    (goal /80)      All Future Testing planned in [facility]:  Lab Frequency Next Occurrence   DEBBY DIGITAL SCREEN W OR WO CAD BILATERAL Once 04/05/2022   Full PFT Study With Bronchodilator Once 04/19/2022       Next Visit Date:  No future appointments.          Patient Active Problem List: Bipolar disorder ([facility]     Scoliosis     Depression     Hyperlipemia     Essential hypertension     Prediabetes     Gastroesophageal reflux disease without esophagitis     Vitamin D deficiency     Mild episode of recurrent major depressive disorder (HCC)     Type 2 diabetes mellitus without complication, without long-term current use of insulin ([facility]     Accidental poisoning by carbon monoxide

## 2023-02-06 NOTE — TELEPHONE ENCOUNTER
Lisinopril pending refill        Health Maintenance   Topic Date Due    COVID-19 Vaccine (1) Never done    Hepatitis B vaccine (1 of 3 - Risk 3-dose series) Never done    Colorectal Cancer Screen  Never done    Shingles vaccine (1 of 2) Never done    Low dose CT lung screening  Never done    Diabetic foot exam  06/01/2019    Breast cancer screen  01/11/2021    Diabetic retinal exam  02/18/2021    Flu vaccine (1) 08/01/2022    A1C test (Diabetic or Prediabetic)  04/05/2023    Diabetic Alb to Cr ratio (uACR) test  04/05/2023    Lipids  04/05/2023    GFR test (Diabetes, CKD 3-4, OR last GFR 15-59)  04/05/2023    Depression Monitoring  04/19/2023    DTaP/Tdap/Td vaccine (2 - Td or Tdap) 11/14/2027    Pneumococcal 0-64 years Vaccine  Completed    Hepatitis C screen  Completed    HIV screen  Completed    Hepatitis A vaccine  Aged Out    Hib vaccine  Aged Out    Meningococcal (ACWY) vaccine  Aged Out             (applicable per patient's age: Cancer Screenings, Depression Screening, Fall Risk Screening, Immunizations)    Hemoglobin A1C (%)   Date Value   04/05/2022 6.4   01/27/2020 5.4   06/01/2018 6.2     Microalb/Crt. Ratio (mcg/mg creat)   Date Value   04/05/2022 Can not be calculated     LDL Cholesterol (mg/dL)   Date Value   04/05/2022 265 (H)     LDL Calculated (mg/dL)   Date Value   04/04/2014 253 (A)     AST (U/L)   Date Value   04/05/2022 19     ALT (U/L)   Date Value   04/05/2022 16     BUN (mg/dL)   Date Value   04/05/2022 9      (goal A1C is < 7)   (goal LDL is <100) need 30-50% reduction from baseline     BP Readings from Last 3 Encounters:   11/22/22 (!) 131/97   10/09/22 (!) 151/88   06/07/22 120/84    (goal /80)      All Future Testing planned in [facility]:  Lab Frequency Next Occurrence   DEBBY DIGITAL SCREEN W OR WO CAD BILATERAL Once 04/05/2022   Full PFT Study With Bronchodilator Once 04/19/2022       Next Visit Date:  No future appointments.          Patient Active Problem List:     Bipolar disorder ([facility]     Scoliosis     Depression     Hyperlipemia     Essential hypertension     Prediabetes     Gastroesophageal reflux disease without esophagitis     Vitamin D deficiency     Mild episode of recurrent major depressive disorder (HCC)     Type 2 diabetes mellitus without complication, without long-term current use of insulin ([facility]     Accidental poisoning by carbon monoxide

## 2023-03-15 DIAGNOSIS — I10 ESSENTIAL HYPERTENSION: ICD-10-CM

## 2023-03-15 RX ORDER — LISINOPRIL 5 MG/1
5 TABLET ORAL DAILY
Qty: 30 TABLET | Refills: 0 | Status: SHIPPED | OUTPATIENT
Start: 2023-03-15

## 2023-03-15 NOTE — TELEPHONE ENCOUNTER
E-scribe request for med refill. Please review and e-scribe if applicable.     Last Visit Date:  06/07/2022  Next Visit Date:  4/5/2023    Hemoglobin A1C (%)   Date Value   04/05/2022 6.4   01/27/2020 5.4   06/01/2018 6.2             ( goal A1C is < 7)   Microalb/Crt. Ratio (mcg/mg creat)   Date Value   04/05/2022 Can not be calculated     LDL Cholesterol (mg/dL)   Date Value   04/05/2022 265 (H)     LDL Calculated (mg/dL)   Date Value   04/04/2014 253 (A)       (goal LDL is <100)   AST (U/L)   Date Value   04/05/2022 19     ALT (U/L)   Date Value   04/05/2022 16     BUN (mg/dL)   Date Value   04/05/2022 9     BP Readings from Last 3 Encounters:   11/22/22 (!) 131/97   10/09/22 (!) 151/88   06/07/22 120/84          (goal 120/80)        Patient Active Problem List:     Bipolar disorder (HCC)     Scoliosis     Depression     Hyperlipemia     Essential hypertension     Prediabetes     Gastroesophageal reflux disease without esophagitis     Vitamin D deficiency     Mild episode of recurrent major depressive disorder (HCC)     Type 2 diabetes mellitus without complication, without long-term current use of insulin (HCC)     Accidental poisoning by carbon monoxide      ----MAMTA

## 2023-03-21 ENCOUNTER — HOSPITAL ENCOUNTER (EMERGENCY)
Age: 53
Discharge: HOME | End: 2023-03-22
Attending: EMERGENCY MEDICINE
Payer: MEDICAID

## 2023-03-21 ENCOUNTER — APPOINTMENT (OUTPATIENT)
Dept: GENERAL RADIOLOGY | Age: 53
End: 2023-03-21
Payer: MEDICAID

## 2023-03-21 VITALS
DIASTOLIC BLOOD PRESSURE: 64 MMHG | TEMPERATURE: 97.7 F | BODY MASS INDEX: 15.43 KG/M2 | HEIGHT: 66 IN | WEIGHT: 96 LBS | RESPIRATION RATE: 22 BRPM | SYSTOLIC BLOOD PRESSURE: 103 MMHG | OXYGEN SATURATION: 96 % | HEART RATE: 90 BPM

## 2023-03-21 DIAGNOSIS — S39.012A LUMBOSACRAL STRAIN, INITIAL ENCOUNTER: Primary | ICD-10-CM

## 2023-03-21 PROCEDURE — 51798 US URINE CAPACITY MEASURE: CPT

## 2023-03-21 PROCEDURE — 6370000000 HC RX 637 (ALT 250 FOR IP): Performed by: HEALTH CARE PROVIDER

## 2023-03-21 PROCEDURE — 81003 URINALYSIS AUTO W/O SCOPE: CPT

## 2023-03-21 PROCEDURE — 72190 X-RAY EXAM OF PELVIS: CPT

## 2023-03-21 PROCEDURE — 72100 X-RAY EXAM L-S SPINE 2/3 VWS: CPT

## 2023-03-21 PROCEDURE — 99284 EMERGENCY DEPT VISIT MOD MDM: CPT

## 2023-03-21 RX ORDER — ACETAMINOPHEN 500 MG
1000 TABLET ORAL ONCE
Status: COMPLETED | OUTPATIENT
Start: 2023-03-21 | End: 2023-03-21

## 2023-03-21 RX ORDER — ORPHENADRINE CITRATE 30 MG/ML
60 INJECTION INTRAMUSCULAR; INTRAVENOUS ONCE
Status: DISCONTINUED | OUTPATIENT
Start: 2023-03-21 | End: 2023-03-22

## 2023-03-21 RX ADMIN — ACETAMINOPHEN 1000 MG: 500 TABLET ORAL at 23:55

## 2023-03-21 ASSESSMENT — PAIN - FUNCTIONAL ASSESSMENT: PAIN_FUNCTIONAL_ASSESSMENT: 0-10

## 2023-03-21 ASSESSMENT — PAIN SCALES - GENERAL: PAINLEVEL_OUTOF10: 7

## 2023-03-22 LAB
BILIRUBIN URINE: NEGATIVE
COLOR: YELLOW
COMMENT UA: ABNORMAL
GLUCOSE UR STRIP.AUTO-MCNC: NEGATIVE MG/DL
KETONES UR STRIP.AUTO-MCNC: NEGATIVE MG/DL
LEUKOCYTE ESTERASE UR QL STRIP.AUTO: NEGATIVE
NITRITE UR QL STRIP.AUTO: NEGATIVE
PROT UR STRIP.AUTO-MCNC: 5.5 MG/DL (ref 5–8)
PROT UR STRIP.AUTO-MCNC: NEGATIVE MG/DL
SPECIFIC GRAVITY UA: 1 (ref 1–1.03)
TURBIDITY: CLEAR
URINE HGB: NEGATIVE
UROBILINOGEN, URINE: NORMAL

## 2023-03-22 PROCEDURE — 6370000000 HC RX 637 (ALT 250 FOR IP): Performed by: HEALTH CARE PROVIDER

## 2023-03-22 PROCEDURE — 51798 US URINE CAPACITY MEASURE: CPT

## 2023-03-22 RX ORDER — LIDOCAINE 50 MG/G
1 PATCH TOPICAL DAILY
Qty: 30 PATCH | Refills: 0 | Status: SHIPPED | OUTPATIENT
Start: 2023-03-22

## 2023-03-22 RX ORDER — METHOCARBAMOL 500 MG/1
500 TABLET, FILM COATED ORAL 4 TIMES DAILY
Qty: 40 TABLET | Refills: 0 | Status: SHIPPED | OUTPATIENT
Start: 2023-03-22 | End: 2023-04-01

## 2023-03-22 RX ORDER — LIDOCAINE 4 G/G
1 PATCH TOPICAL DAILY
Status: DISCONTINUED | OUTPATIENT
Start: 2023-03-22 | End: 2023-03-22

## 2023-03-22 RX ORDER — LIDOCAINE 4 G/G
1 PATCH TOPICAL ONCE
Status: DISCONTINUED | OUTPATIENT
Start: 2023-03-22 | End: 2023-03-22 | Stop reason: HOSPADM

## 2023-03-22 RX ORDER — OXYCODONE HYDROCHLORIDE AND ACETAMINOPHEN 5; 325 MG/1; MG/1
1 TABLET ORAL EVERY 6 HOURS PRN
Qty: 5 TABLET | Refills: 0 | Status: SHIPPED | OUTPATIENT
Start: 2023-03-22 | End: 2023-03-24

## 2023-03-22 RX ORDER — METHOCARBAMOL 500 MG/1
1500 TABLET, FILM COATED ORAL ONCE
Status: COMPLETED | OUTPATIENT
Start: 2023-03-22 | End: 2023-03-22

## 2023-03-22 RX ORDER — OXYCODONE HYDROCHLORIDE 5 MG/1
5 TABLET ORAL ONCE
Status: COMPLETED | OUTPATIENT
Start: 2023-03-22 | End: 2023-03-22

## 2023-03-22 RX ADMIN — OXYCODONE HYDROCHLORIDE 5 MG: 5 TABLET ORAL at 01:21

## 2023-03-22 RX ADMIN — METHOCARBAMOL 1500 MG: 500 TABLET ORAL at 00:26

## 2023-03-22 ASSESSMENT — ENCOUNTER SYMPTOMS
NAUSEA: 0
DIARRHEA: 0
SHORTNESS OF BREATH: 0
CONSTIPATION: 0
BACK PAIN: 1
ABDOMINAL PAIN: 0
VOMITING: 0

## 2023-03-22 NOTE — ED PROVIDER NOTES
[facility]     Emergency Department     Faculty Attestation    I performed a history and physical examination of the patient and discussed management with the resident. I reviewed the residents note and agree with the documented findings and plan of care. Any areas of disagreement are noted on the chart. I was personally present for the key portions of any procedures. I have documented in the chart those procedures where I was not present during the key portions. I have reviewed the emergency nurses triage note. I agree with the chief complaint, past medical history, past surgical history, allergies, medications, social and family history as documented unless otherwise noted below. For Physician Assistant/ Nurse Practitioner cases/documentation I have personally evaluated this patient and have completed at least one if not all key elements of the E/M (history, physical exam, and MDM). Additional findings are as noted. I have personally seen and evaluated the patient. I find the patient's history and physical exam are consistent with the NP/PA documentation. I agree with the care provided, treatment rendered, disposition and follow-up plan. Patient awoke this morning complaining primarily of pain in the left lower back mild radiation into the left lower extremity denies paresthesias to the lower extremities denies weakness to the lower extremities denies urinary incontinence has found it difficult to urinate due to pain denies abdominal pain patient is specifically tender over the left sized SI joint. No other complaints or injuries    Pain appears to be very musculoskeletal in origin will be followed by Dr. Monet Valles at [address].   Attending Emergency  Physician            Eduin Burnette MD  03/21/23 [id number]

## 2023-03-22 NOTE — DISCHARGE INSTRUCTIONS
You are seen in the emergency department for low back pain. Based on our evaluation, you are safe to discharge. Your symptoms most likely due to strain of the muscles in your lower back. Please take medication exactly as prescribed. You may apply heat for 20 minutes at a time as needed. Please perform the back exercises that have been included in your discharge instructions. If you experience worsening pain, fever, chills, weakness, numbness, tingling, difficulty urinating, or any other symptom you find concerning, please return to the emergency department immediately for reevaluation.

## 2023-03-22 NOTE — ED PROVIDER NOTES
Historical Provider, MD         REVIEW OF SYSTEMS       Review of Systems   Constitutional:  Positive for activity change. Negative for chills, fatigue and fever. Respiratory:  Negative for shortness of breath. Cardiovascular:  Negative for chest pain. Gastrointestinal:  Negative for abdominal pain, constipation, diarrhea, nausea and vomiting. Genitourinary:  Positive for difficulty urinating. Negative for flank pain. Musculoskeletal:  Positive for back pain and gait problem. Skin:  Negative for wound. Allergic/Immunologic: Negative for immunocompromised state. Neurological:  Negative for weakness, numbness and headaches. Hematological:  Does not bruise/bleed easily. PHYSICAL EXAM      INITIAL VITALS:   /64   Pulse 90   Temp 97.7 °F (36.5 °C) (Oral)   Resp 22   Ht 5' 6\" (1.676 m)   Wt 96 lb (43.5 kg)   SpO2 96%   BMI 15.49 kg/m²     Physical Exam  Vitals reviewed. Constitutional:       General: She is in acute distress. Appearance: Normal appearance. HENT:      Head: Normocephalic and atraumatic. Cardiovascular:      Rate and Rhythm: Normal rate and regular rhythm. Pulses: Normal pulses. Heart sounds: Normal heart sounds. Pulmonary:      Effort: Pulmonary effort is normal.      Breath sounds: Normal breath sounds. Musculoskeletal:         General: Tenderness present. Comments: Left paraspinal muscle spasm in the lumbar region with tenderness to palpation. Ipsilateral and contralateral leg raise negative. JENNIFER test negative. Decreased range of motion with forward flexion, extension and leftward flexion and rotation on exam.   Skin:     General: Skin is warm and dry. Capillary Refill: Capillary refill takes less than 2 seconds. Neurological:      Mental Status: She is alert. Sensory: No sensory deficit. Motor: No weakness.          DDX/DIAGNOSTIC RESULTS / EMERGENCY DEPARTMENT COURSE / MDM     Medical Decision Making  60-year-old Intended supply: 5 days.  Take lowest dose possible to manage pain Max Daily Amount: 4 tablets, Disp-5 tablet, R-0Print             Robert Ramirez MD  Emergency Medicine Resident    (Please note that portions of thisnote were completed with a voice recognition program.  Efforts were made to edit the dictations but occasionally words are mis-transcribed.)      Robert Ramirez MD  Resident  03/22/23 [id number]

## 2023-03-22 NOTE — ED PROVIDER NOTES
Faculty Sign-Out Attestation  Handoff taken on the following patient from prior Attending Physician: Gabriel Palma    I was available and discussed any additional care issues that arose and coordinated the management plans with the resident(s) caring for the patient during my duty period. Any areas of disagreement with residents documentation of care or procedures are noted on the chart. I was personally present for the key portions of any/all procedures during my duty period. I have documented in the chart those procedures where I was not present during the key portions.     Muscular tenderness at SI joint, positional,   Will treat as musculoskeletal    > discharging per plan       Vishal Lira DO  03/22/23 [id number]

## 2023-03-22 NOTE — ED TRIAGE NOTES
Pt ambulated to hallway 26B from triage with c/o lower back pain since this am. Pt states the pain started this am and has gradually gotten worse. Pt reports that she is having trouble walking and bending due to the pain. Pt further reports that she experiences a \"shooting\" pain that travels down her left leg. Pt denies injury. No other complaints at this time. Breathing is non-labored. Pt is next to nurses station.

## 2023-03-24 ENCOUNTER — HOSPITAL ENCOUNTER (EMERGENCY)
Age: 53
Discharge: HOME | End: 2023-03-24
Attending: EMERGENCY MEDICINE
Payer: MEDICAID

## 2023-03-24 VITALS
HEART RATE: 93 BPM | HEIGHT: 65 IN | OXYGEN SATURATION: 95 % | TEMPERATURE: 97.3 F | SYSTOLIC BLOOD PRESSURE: 127 MMHG | RESPIRATION RATE: 18 BRPM | DIASTOLIC BLOOD PRESSURE: 77 MMHG | BODY MASS INDEX: 15.98 KG/M2

## 2023-03-24 DIAGNOSIS — R10.9 CHRONIC LEFT FLANK PAIN: Primary | ICD-10-CM

## 2023-03-24 DIAGNOSIS — G89.29 CHRONIC LEFT FLANK PAIN: Primary | ICD-10-CM

## 2023-03-24 LAB
BILIRUBIN URINE: NEGATIVE
COLOR: YELLOW
COMMENT UA: NORMAL
GLUCOSE UR STRIP.AUTO-MCNC: NEGATIVE MG/DL
KETONES UR STRIP.AUTO-MCNC: NEGATIVE MG/DL
LEUKOCYTE ESTERASE UR QL STRIP.AUTO: NEGATIVE
NITRITE UR QL STRIP.AUTO: NEGATIVE
PROT UR STRIP.AUTO-MCNC: 5 MG/DL (ref 5–8)
PROT UR STRIP.AUTO-MCNC: NEGATIVE MG/DL
SPECIFIC GRAVITY UA: 1.02 (ref 1–1.03)
TURBIDITY: CLEAR
URINE HGB: NEGATIVE
UROBILINOGEN, URINE: NORMAL

## 2023-03-24 PROCEDURE — 6370000000 HC RX 637 (ALT 250 FOR IP): Performed by: EMERGENCY MEDICINE

## 2023-03-24 PROCEDURE — 99283 EMERGENCY DEPT VISIT LOW MDM: CPT

## 2023-03-24 PROCEDURE — 81003 URINALYSIS AUTO W/O SCOPE: CPT

## 2023-03-24 RX ORDER — ACETAMINOPHEN 500 MG
1000 TABLET ORAL ONCE
Status: COMPLETED | OUTPATIENT
Start: 2023-03-24 | End: 2023-03-24

## 2023-03-24 RX ADMIN — ACETAMINOPHEN 1000 MG: 500 TABLET ORAL at 17:20

## 2023-03-24 ASSESSMENT — PAIN DESCRIPTION - ORIENTATION: ORIENTATION: LEFT

## 2023-03-24 ASSESSMENT — PAIN - FUNCTIONAL ASSESSMENT: PAIN_FUNCTIONAL_ASSESSMENT: 0-10

## 2023-03-24 ASSESSMENT — ENCOUNTER SYMPTOMS
COUGH: 0
COLOR CHANGE: 0
SHORTNESS OF BREATH: 0
ABDOMINAL PAIN: 0
BACK PAIN: 1

## 2023-03-24 ASSESSMENT — PAIN SCALES - GENERAL
PAINLEVEL_OUTOF10: 8
PAINLEVEL_OUTOF10: 7

## 2023-03-24 ASSESSMENT — PAIN DESCRIPTION - LOCATION
LOCATION: FLANK
LOCATION: ABDOMEN

## 2023-03-24 NOTE — DISCHARGE INSTRUCTIONS
You were seen here for left flank pain. Ultrasound was performed to evaluate your kidneys. Kidneys appeared normal with no hydronephrosis therefore unlikely that you have an obstructing kidney stone. Attend your follow-up appointment scheduled with your primary care provider. Turn to the emergency department if you experience worsening symptoms including fever, chest pain, shortness of breath, blood in your urine, vomiting blood, blood in your stool, or any other concerns.

## 2023-03-24 NOTE — ED PROVIDER NOTES
vomiting. Awake, alert. Low back-mild tenderness over the SI joint on the left. No swelling or crepitus or erythema. No midline or dorsal spinous proc tenderness. No CVAT. Lower extremities-normal/sym sen/strength/DTR's/pulses/SLR. Gait normal.  Abdomen is soft, flat, nondistended, nontender. No organomegaly, mass, or bruit noted. Bowel sounds are 1+/4 and soft. I reviewed the patient's recent radiographs as well as the radiologist interpretation. Impression: Low back pain, chronic, recurrent. Plan: Patient will be discharged with prescriptions for nonopiate analgesics. She is advised to keep her appointment with her primary care provider in 2 weeks as scheduled. She is asked to return to the emergency department should her symptoms worsen, persist, progress, recur.           Ernestina Reyes MD  03/24/23 [id number]

## 2023-03-24 NOTE — ED PROVIDER NOTES
[facility] ED  Emergency Department Encounter  Emergency Medicine Resident     Pt Name:Sonja Alexander  MRN: [id number]  [place] 1970  Date of evaluation: 3/24/23  PCP:  Skip Gabriel DO  Note Started: 3:29 PM EDT      CHIEF COMPLAINT       Chief Complaint   Patient presents with    Flank Pain     Pain on L flank, per pt been ongoing for \"a while\"       HISTORY OF PRESENT ILLNESS  (Location/Symptom, Timing/Onset, Context/Setting, Quality, Duration, Modifying Factors, Severity.)      Ruth Saha is a 46 y.o. female who presents with left flank pain for the past several months. Patient states that she was seen here for the same pain a few days ago and they sent her home with a muscle relaxer and Percocet. Patient states that she is out of her medication and cannot wait for her follow-up appointment with her primary care doctor on April 5. Patient denies dysuria but states that after urinating she still feels like she needs to urinate more. Patient denies chest pain, shortness of breath, abdominal pain. PAST MEDICAL / SURGICAL / SOCIAL / FAMILY HISTORY      has a past medical history of Anxiety, Bipolar disorder ([address] Brain aneurysm, Depression, Epilepsy ([address] Essential hypertension, Gastroesophageal reflux disease without esophagitis, Headache, Hyperlipidemia, Liver disease, Manic depression ([address] Pancreatitis, Scoliosis, and Seasonal allergies. has a past surgical history that includes hernia repair; Tonsillectomy; other surgical history (09/22/2015); Hysterectomy; hc picc power 5f triple (9/23/2015); and brain surgery.       Social History     Socioeconomic History    Marital status: Single     Spouse name: Not on file    Number of children: Not on file    Years of education: Not on file    Highest education level: Not on file   Occupational History    Not on file   Tobacco Use    Smoking status: Every Day     Packs/day: 0.50     Years: 40.00     Pack years: 20.00 in diameter at 1.44 cm, no aneurysm noted. Will obtain urinalysis. Will give Tylenol for pain. Amount and/or Complexity of Data Reviewed  Labs: ordered. Risk  OTC drugs. EMERGENCY DEPARTMENT COURSE:  Patient evaluated at bedside. Urinalysis obtained. Tylenol given for pain. Point-of-care ultrasound of aorta and kidneys obtained. Patient was reassessed, still has some mild left flank pain. Patient was advised to attend her follow-up appointment with her primary care provider on April 5. Patient is medically cleared for discharge. PROCEDURES:  None    CONSULTS:  None      FINAL IMPRESSION      1. Chronic left flank pain          DISPOSITION / PLAN     DISPOSITION Decision To Discharge 03/24/2023 05:17:17 PM      [facility]:  [facility] [address] [facility]   [place] [id number]  [phone]    Schedule an appointment as soon as possible for a visit       [facility]  [address] [place] [address]  [phone]    As needed    DISCHARGE MEDICATIONS:  New Prescriptions    No medications on file       Taurus Astorga MD  Emergency Medicine Resident    (Please note that portions of thisnote were completed with a voice recognition program.  Efforts were made to edit the dictations but occasionally words are mis-transcribed.)      Taurus Astorga MD  Resident  03/24/23 1406

## 2023-03-24 NOTE — ED NOTES
Pt to ED room 23 with c/o left flank pain for past month or so that is getting worse. Pt denies any other symptoms at this time. Pt given urine cup for specimen.       Austin Dai RN  03/24/23 5904

## 2023-03-31 DIAGNOSIS — F17.200 SMOKER: ICD-10-CM

## 2023-03-31 DIAGNOSIS — R05.3 CHRONIC COUGH: ICD-10-CM

## 2023-03-31 NOTE — TELEPHONE ENCOUNTER
E-scribe request for med refills. Please review and e-scribe if applicable. Last Visit Date:  6/7/22  Next Visit Date:  4/5/2023    Hemoglobin A1C (%)   Date Value   04/05/2022 6.4   01/27/2020 5.4   06/01/2018 6.2             ( goal A1C is < 7)   Microalb/Crt.  Ratio (mcg/mg creat)   Date Value   04/05/2022 Can not be calculated     LDL Cholesterol (mg/dL)   Date Value   04/05/2022 265 (H)     LDL Calculated (mg/dL)   Date Value   04/04/2014 253 (A)       (goal LDL is <100)   AST (U/L)   Date Value   04/05/2022 19     ALT (U/L)   Date Value   04/05/2022 16     BUN (mg/dL)   Date Value   04/05/2022 9     BP Readings from Last 3 Encounters:   03/24/23 127/77   03/21/23 103/64   11/22/22 (!) 131/97          (goal 120/80)        Patient Active Problem List:     Bipolar disorder ([address]     Scoliosis     Depression     Hyperlipemia     Essential hypertension     Prediabetes     Gastroesophageal reflux disease without esophagitis     Vitamin D deficiency     Mild episode of recurrent major depressive disorder (HCC)     Type 2 diabetes mellitus without complication, without long-term current use of insulin ([address]     Accidental poisoning by carbon monoxide      ----Bernard Herron

## 2023-04-05 RX ORDER — TIOTROPIUM BROMIDE 18 UG/1
18 CAPSULE ORAL; RESPIRATORY (INHALATION) DAILY
Qty: 30 CAPSULE | Refills: 0 | Status: SHIPPED | OUTPATIENT
Start: 2023-04-05

## 2023-04-17 ENCOUNTER — TELEPHONE (OUTPATIENT)
Dept: FAMILY MEDICINE CLINIC | Age: 53
End: 2023-04-17

## 2023-04-17 NOTE — TELEPHONE ENCOUNTER
Patient's BF called stating patient has a cough and he believes it's a smokers cough, but not completely sure. Asking if something can be sent to patient's pharmacy for her. Patient has scheduled appointment on 04/27/23.

## 2023-04-19 DIAGNOSIS — E78.00 PURE HYPERCHOLESTEROLEMIA: ICD-10-CM

## 2023-04-19 DIAGNOSIS — R05.3 CHRONIC COUGH: ICD-10-CM

## 2023-04-19 DIAGNOSIS — I10 ESSENTIAL HYPERTENSION: ICD-10-CM

## 2023-04-19 DIAGNOSIS — E11.9 TYPE 2 DIABETES MELLITUS WITHOUT COMPLICATION, WITHOUT LONG-TERM CURRENT USE OF INSULIN (HCC): ICD-10-CM

## 2023-04-19 RX ORDER — LISINOPRIL 5 MG/1
5 TABLET ORAL DAILY
Qty: 30 TABLET | Refills: 0 | Status: SHIPPED | OUTPATIENT
Start: 2023-04-19

## 2023-04-19 RX ORDER — ATORVASTATIN CALCIUM 80 MG/1
80 TABLET, FILM COATED ORAL DAILY
Qty: 30 TABLET | Refills: 0 | Status: SHIPPED | OUTPATIENT
Start: 2023-04-19

## 2023-04-19 RX ORDER — OMEPRAZOLE 20 MG/1
20 CAPSULE, DELAYED RELEASE ORAL DAILY
Qty: 30 CAPSULE | Refills: 3 | Status: SHIPPED | OUTPATIENT
Start: 2023-04-19

## 2023-04-19 RX ORDER — ALBUTEROL SULFATE 90 UG/1
AEROSOL, METERED RESPIRATORY (INHALATION)
Qty: 18 G | Refills: 0 | Status: SHIPPED | OUTPATIENT
Start: 2023-04-19

## 2023-04-19 NOTE — TELEPHONE ENCOUNTER
E-scribe request for Ventolin  (90 Base)MCG/ACT AERS. Please review and e-scribe if applicable. Last Visit Date:  6/7/2022  Next Visit Date:  4/19/2023    Hemoglobin A1C (%)   Date Value   04/05/2022 6.4   01/27/2020 5.4   06/01/2018 6.2             ( goal A1C is < 7)   Microalb/Crt.  Ratio (mcg/mg creat)   Date Value   04/05/2022 Can not be calculated     LDL Cholesterol (mg/dL)   Date Value   04/05/2022 265 (H)     LDL Calculated (mg/dL)   Date Value   04/04/2014 253 (A)       (goal LDL is <100)   AST (U/L)   Date Value   04/05/2022 19     ALT (U/L)   Date Value   04/05/2022 16     BUN (mg/dL)   Date Value   04/05/2022 9     BP Readings from Last 3 Encounters:   03/24/23 127/77   03/21/23 103/64   11/22/22 (!) 131/97          (goal 120/80)        Patient Active Problem List:     Bipolar disorder ([facility] [address]     Scoliosis     Depression     Hyperlipemia     Essential hypertension     Prediabetes     Gastroesophageal reflux disease without esophagitis     Vitamin D deficiency     Mild episode of recurrent major depressive disorder (HCC)     Type 2 diabetes mellitus without complication, without long-term current use of insulin (HCC)     Accidental poisoning by carbon monoxide      ----JF

## 2023-04-19 NOTE — TELEPHONE ENCOUNTER
E-scribe request for Lisinopril 5MG TABS. Please review and e-scribe if applicable. Last Visit Date:  6/7/2022  Next Visit Date:  4/27/2023    Hemoglobin A1C (%)   Date Value   04/05/2022 6.4   01/27/2020 5.4   06/01/2018 6.2             ( goal A1C is < 7)   Microalb/Crt.  Ratio (mcg/mg creat)   Date Value   04/05/2022 Can not be calculated     LDL Cholesterol (mg/dL)   Date Value   04/05/2022 265 (H)     LDL Calculated (mg/dL)   Date Value   04/04/2014 253 (A)       (goal LDL is <100)   AST (U/L)   Date Value   04/05/2022 19     ALT (U/L)   Date Value   04/05/2022 16     BUN (mg/dL)   Date Value   04/05/2022 9     BP Readings from Last 3 Encounters:   03/24/23 127/77   03/21/23 103/64   11/22/22 (!) 131/97          (goal 120/80)        Patient Active Problem List:     Bipolar disorder ([facility] [address]     Scoliosis     Depression     Hyperlipemia     Essential hypertension     Prediabetes     Gastroesophageal reflux disease without esophagitis     Vitamin D deficiency     Mild episode of recurrent major depressive disorder (HCC)     Type 2 diabetes mellitus without complication, without long-term current use of insulin (HCC)     Accidental poisoning by carbon monoxide      ----JF

## 2023-04-19 NOTE — TELEPHONE ENCOUNTER
Writer contacted pt and left vm for pt to contact office to [facility] appt.
Frequency Next Occurrence   DEBBY DIGITAL SCREEN W OR WO CAD BILATERAL Once 04/05/2022   Full PFT Study With Bronchodilator Once 04/19/2022               Patient Active Problem List:     Bipolar disorder ([address]     Scoliosis     Depression     Hyperlipemia     Essential hypertension     Prediabetes     Gastroesophageal reflux disease without esophagitis     Vitamin D deficiency     Mild episode of recurrent major depressive disorder (HCC)     Type 2 diabetes mellitus without complication, without long-term current use of insulin ([address]     Accidental poisoning by carbon monoxide

## 2023-05-02 DIAGNOSIS — R05.3 CHRONIC COUGH: ICD-10-CM

## 2023-05-02 DIAGNOSIS — F17.200 SMOKER: ICD-10-CM

## 2023-05-02 RX ORDER — TIOTROPIUM BROMIDE 18 UG/1
18 CAPSULE ORAL; RESPIRATORY (INHALATION) DAILY
Qty: 30 CAPSULE | Refills: 0 | OUTPATIENT
Start: 2023-05-02

## 2023-05-16 DIAGNOSIS — I10 ESSENTIAL HYPERTENSION: ICD-10-CM

## 2023-05-16 DIAGNOSIS — E78.00 PURE HYPERCHOLESTEROLEMIA: ICD-10-CM

## 2023-05-19 RX ORDER — ATORVASTATIN CALCIUM 80 MG/1
80 TABLET, FILM COATED ORAL DAILY
Qty: 30 TABLET | Refills: 0 | Status: SHIPPED | OUTPATIENT
Start: 2023-05-19

## 2023-05-19 RX ORDER — LISINOPRIL 5 MG/1
5 TABLET ORAL DAILY
Qty: 30 TABLET | Refills: 0 | Status: SHIPPED | OUTPATIENT
Start: 2023-05-19

## 2023-05-30 DIAGNOSIS — F17.200 SMOKER: ICD-10-CM

## 2023-05-30 DIAGNOSIS — R05.3 CHRONIC COUGH: ICD-10-CM

## 2023-06-07 ENCOUNTER — HOSPITAL ENCOUNTER (OUTPATIENT)
Age: 53
Setting detail: SPECIMEN
Discharge: HOME | End: 2023-06-07

## 2023-06-07 DIAGNOSIS — R73.03 PREDIABETES: ICD-10-CM

## 2023-06-07 LAB
CREAT UR-MCNC: 132.6 MG/DL (ref 28–217)
TOTAL PROTEIN, URINE: 13 MG/DL
URINE TOTAL PROTEIN CREATININE RATIO: 0.1 (ref 0–0.2)

## 2023-06-09 RX ORDER — TIOTROPIUM BROMIDE 18 UG/1
18 CAPSULE ORAL; RESPIRATORY (INHALATION) DAILY
Qty: 30 CAPSULE | Refills: 0 | Status: SHIPPED | OUTPATIENT
Start: 2023-06-09

## 2023-07-03 DIAGNOSIS — R05.3 CHRONIC COUGH: ICD-10-CM

## 2023-07-03 DIAGNOSIS — F17.200 SMOKER: ICD-10-CM

## 2023-07-03 DIAGNOSIS — I10 ESSENTIAL HYPERTENSION: ICD-10-CM

## 2023-07-03 DIAGNOSIS — E78.00 PURE HYPERCHOLESTEROLEMIA: ICD-10-CM

## 2023-07-03 NOTE — TELEPHONE ENCOUNTER
E-scribe request for LISINOPRIL 5 MG. Please review and e-scribe if applicable. Last Visit Date:  6*4*3484  Next Visit Date:  7/6/2023    Hemoglobin A1C (%)   Date Value   06/07/2023 6.3   04/05/2022 6.4   01/27/2020 5.4             ( goal A1C is < 7)   Microalb/Crt.  Ratio (mcg/mg creat)   Date Value   04/05/2022 Can not be calculated     LDL Cholesterol (mg/dL)   Date Value   04/05/2022 265 (H)     LDL Calculated (mg/dL)   Date Value   04/04/2014 253 (A)       (goal LDL is <100)   AST (U/L)   Date Value   04/05/2022 19     ALT (U/L)   Date Value   04/05/2022 16     BUN (mg/dL)   Date Value   04/05/2022 9     BP Readings from Last 3 Encounters:   06/07/23 122/73   03/24/23 127/77   03/21/23 103/64          (goal 120/80)        Patient Active Problem List:     Bipolar disorder ([address])     Scoliosis     Depression     Hyperlipemia     Essential hypertension     Prediabetes     Gastroesophageal reflux disease without esophagitis     Vitamin D deficiency     Mild episode of recurrent major depressive disorder (HCC)     Type 2 diabetes mellitus without complication, without long-term current use of insulin (HCC)     Accidental poisoning by carbon monoxide      ----JF

## 2023-07-03 NOTE — TELEPHONE ENCOUNTER
E-scribe request for [facility]. Please review and e-scribe if applicable. Last Visit Date:  6/7/2023  Next Visit Date:  7/6/2023    Hemoglobin A1C (%)   Date Value   06/07/2023 6.3   04/05/2022 6.4   01/27/2020 5.4             ( goal A1C is < 7)   Microalb/Crt.  Ratio (mcg/mg creat)   Date Value   04/05/2022 Can not be calculated     LDL Cholesterol (mg/dL)   Date Value   04/05/2022 265 (H)     LDL Calculated (mg/dL)   Date Value   04/04/2014 253 (A)       (goal LDL is <100)   AST (U/L)   Date Value   04/05/2022 19     ALT (U/L)   Date Value   04/05/2022 16     BUN (mg/dL)   Date Value   04/05/2022 9     BP Readings from Last 3 Encounters:   06/07/23 122/73   03/24/23 127/77   03/21/23 103/64          (goal 120/80)        Patient Active Problem List:     Bipolar disorder ([address])     Scoliosis     Depression     Hyperlipemia     Essential hypertension     Prediabetes     Gastroesophageal reflux disease without esophagitis     Vitamin D deficiency     Mild episode of recurrent major depressive disorder (HCC)     Type 2 diabetes mellitus without complication, without long-term current use of insulin (HCC)     Accidental poisoning by carbon monoxide      ----JF

## 2023-07-03 NOTE — TELEPHONE ENCOUNTER
E-scribe request for LIPITOR 80 MG. Please review and e-scribe if applicable. Last Visit Date:  6/7/2023  Next Visit Date:  7/6/2023    Hemoglobin A1C (%)   Date Value   06/07/2023 6.3   04/05/2022 6.4   01/27/2020 5.4             ( goal A1C is < 7)   Microalb/Crt.  Ratio (mcg/mg creat)   Date Value   04/05/2022 Can not be calculated     LDL Cholesterol (mg/dL)   Date Value   04/05/2022 265 (H)     LDL Calculated (mg/dL)   Date Value   04/04/2014 253 (A)       (goal LDL is <100)   AST (U/L)   Date Value   04/05/2022 19     ALT (U/L)   Date Value   04/05/2022 16     BUN (mg/dL)   Date Value   04/05/2022 9     BP Readings from Last 3 Encounters:   06/07/23 122/73   03/24/23 127/77   03/21/23 103/64          (goal 120/80)        Patient Active Problem List:     Bipolar disorder ([address])     Scoliosis     Depression     Hyperlipemia     Essential hypertension     Prediabetes     Gastroesophageal reflux disease without esophagitis     Vitamin D deficiency     Mild episode of recurrent major depressive disorder (HCC)     Type 2 diabetes mellitus without complication, without long-term current use of insulin (HCC)     Accidental poisoning by carbon monoxide      ----JF

## 2023-07-06 RX ORDER — ATORVASTATIN CALCIUM 80 MG/1
80 TABLET, FILM COATED ORAL DAILY
Qty: 30 TABLET | Refills: 0 | Status: SHIPPED | OUTPATIENT
Start: 2023-07-06

## 2023-07-06 RX ORDER — LISINOPRIL 5 MG/1
5 TABLET ORAL DAILY
Qty: 30 TABLET | Refills: 5 | Status: SHIPPED | OUTPATIENT
Start: 2023-07-06

## 2023-07-06 RX ORDER — TIOTROPIUM BROMIDE 18 UG/1
18 CAPSULE ORAL; RESPIRATORY (INHALATION) DAILY
Qty: 30 CAPSULE | Refills: 5 | Status: SHIPPED | OUTPATIENT
Start: 2023-07-06

## 2023-08-02 DIAGNOSIS — E78.00 PURE HYPERCHOLESTEROLEMIA: ICD-10-CM

## 2023-08-02 NOTE — TELEPHONE ENCOUNTER
Last visit: [id number]  Last Med refill: [id number]  Does patient have enough medication for 72 hours: Yes    Next Visit Date:  No future appointments.     Health Maintenance   Topic Date Due    COVID-19 Vaccine (1) Never done    Hepatitis B vaccine (1 of 3 - Risk 3-dose series) Never done    Colorectal Cancer Screen  Never done    Shingles vaccine (1 of 2) Never done    Diabetic foot exam  06/01/2019    Breast cancer screen  01/11/2021    Diabetic retinal exam  02/18/2021    Lipids  04/05/2023    GFR test (Diabetes, CKD 3-4, OR last GFR 15-59)  04/05/2023    Flu vaccine (1) 08/01/2023    A1C test (Diabetic or Prediabetic)  06/07/2024    Diabetic Alb to Cr ratio (uACR) test  06/07/2024    Depression Monitoring  06/07/2024    DTaP/Tdap/Td vaccine (2 - Td or Tdap) 11/14/2027    Pneumococcal 0-64 years Vaccine  Completed    Hepatitis C screen  Completed    HIV screen  Completed    Hepatitis A vaccine  Aged Out    Hib vaccine  Aged Out    Meningococcal (ACWY) vaccine  Aged Out       Hemoglobin A1C (%)   Date Value   06/07/2023 6.3   04/05/2022 6.4   01/27/2020 5.4             ( goal A1C is < 7)   No components found for: LABMICR  LDL Cholesterol (mg/dL)   Date Value   04/05/2022 265 (H)   06/01/2018 141 (H)     LDL Calculated (mg/dL)   Date Value   04/04/2014 253 (A)       (goal LDL is <100)   AST (U/L)   Date Value   04/05/2022 19     ALT (U/L)   Date Value   04/05/2022 16     BUN (mg/dL)   Date Value   04/05/2022 9     BP Readings from Last 3 Encounters:   06/07/23 122/73   03/24/23 127/77   03/21/23 103/64          (goal 120/80)    All Future Testing planned in [facility]  Lab Frequency Next Occurrence   Comprehensive Metabolic Panel Once 67/59/5346   Lipid Panel Once 06/07/2023   Vitamin D 25 Hydroxy Once 06/07/2023   Hepatitis C RNA, Quantitative, PCR Once 06/07/2023               Patient Active Problem List:     Bipolar disorder ([address])     Scoliosis     Depression     Hyperlipemia     Essential hypertension

## 2023-08-03 RX ORDER — ATORVASTATIN CALCIUM 80 MG/1
80 TABLET, FILM COATED ORAL DAILY
Qty: 30 TABLET | Refills: 0 | Status: SHIPPED | OUTPATIENT
Start: 2023-08-03

## 2023-08-28 ENCOUNTER — APPOINTMENT (OUTPATIENT)
Dept: GENERAL RADIOLOGY | Age: 53
End: 2023-08-28
Payer: MEDICAID

## 2023-08-28 ENCOUNTER — HOSPITAL ENCOUNTER (EMERGENCY)
Age: 53
Discharge: HOME | End: 2023-08-28
Attending: EMERGENCY MEDICINE
Payer: MEDICAID

## 2023-08-28 VITALS
TEMPERATURE: 97.6 F | BODY MASS INDEX: 20.69 KG/M2 | HEART RATE: 98 BPM | RESPIRATION RATE: 18 BRPM | OXYGEN SATURATION: 99 % | SYSTOLIC BLOOD PRESSURE: 133 MMHG | DIASTOLIC BLOOD PRESSURE: 91 MMHG | WEIGHT: 124.34 LBS

## 2023-08-28 DIAGNOSIS — M79.672 LEFT FOOT PAIN: Primary | ICD-10-CM

## 2023-08-28 PROCEDURE — 6370000000 HC RX 637 (ALT 250 FOR IP): Performed by: EMERGENCY MEDICINE

## 2023-08-28 PROCEDURE — 73630 X-RAY EXAM OF FOOT: CPT

## 2023-08-28 PROCEDURE — 99283 EMERGENCY DEPT VISIT LOW MDM: CPT

## 2023-08-28 RX ORDER — ACETAMINOPHEN 500 MG
1000 TABLET ORAL ONCE
Status: COMPLETED | OUTPATIENT
Start: 2023-08-28 | End: 2023-08-28

## 2023-08-28 RX ADMIN — ACETAMINOPHEN 1000 MG: 500 TABLET ORAL at 13:39

## 2023-08-28 ASSESSMENT — PAIN DESCRIPTION - ORIENTATION: ORIENTATION: LEFT

## 2023-08-28 ASSESSMENT — PAIN SCALES - GENERAL: PAINLEVEL_OUTOF10: 10

## 2023-08-28 ASSESSMENT — PAIN DESCRIPTION - LOCATION: LOCATION: FOOT

## 2023-08-28 ASSESSMENT — PAIN - FUNCTIONAL ASSESSMENT: PAIN_FUNCTIONAL_ASSESSMENT: 0-10

## 2023-08-28 NOTE — DISCHARGE INSTRUCTIONS
You are seen in the ER today for your foot pain. We did an x-ray which showed no fracture or other bony abnormality. At this time, you are stable to be discharged. Please continue taking Tylenol at home as needed for pain. You can take 1000 mg of Tylenol up to 3 times a day. Please keep your podiatry appointment as scheduled. Please return to the ER if you develop fevers, chills, redness, or any other concerning symptoms. PLEASE RETURN TO THE EMERGENCY DEPARTMENT IMMEDIATELY if you develop any concerning symptoms such as: chest pain, shortness of breath, feeling like your heart is racing, high fever not relieved by acetaminophen (Tylenol) and/or ibuprofen (Motrin / Advil), chills, persistent nausea and/or vomiting, loss of consciousness, numbness, weakness or tingling in the arms or legs or change in color of the extremities, changes in mental status, persistent or severe headache, blurry vision, loss of bladder / bowel control, unable to follow up with your physician, or other any other care or concern.

## 2023-08-28 NOTE — ED PROVIDER NOTES
[address] [place] [address] ED  Emergency Department Encounter  Emergency Medicine Resident     Pt Name:Sonja Drummond  MRN: [id number]  [facility] [facility] 1970  Date of evaluation: 8/28/23  PCP:  Miguel Simmons MD  Note Started: 1:21 PM EDT      CHIEF COMPLAINT       Chief Complaint   Patient presents with    Foot Pain     Left        HISTORY OF PRESENT ILLNESS  (Location/Symptom, Timing/Onset, Context/Setting, Quality, Duration, Modifying Factors, Severity.)      Juwan Walter is a 46 y.o. female who presents with left foot pain. Progressively worsening over the last 2 weeks. Patient has no known injury. Denies fevers or chills. Patient has previously followed with podiatry for bone spurs and various other foot problems that she cannot elaborate on. Patient states she has taken Tylenol a few times for her pain without relief of her symptoms. Patient states she does have a podiatry appointment coming up on August 6. Patient feels as though there is swelling to the foot as well. Denies any redness, warmth. She states there is nothing particular that makes it better or worse. Patient states it is not particularly worse at any time of day. States it is painful with ambulation. PAST MEDICAL / SURGICAL / SOCIAL / FAMILY HISTORY      has a past medical history of Anxiety, Bipolar disorder ([address]), Brain aneurysm, Depression, Epilepsy ([address]), Essential hypertension, Gastroesophageal reflux disease without esophagitis, Headache, Hyperlipidemia, Liver disease, Manic depression ([address]), Pancreatitis, Scoliosis, and Seasonal allergies. has a past surgical history that includes hernia repair; Tonsillectomy; other surgical history (09/22/2015); Hysterectomy; hc picc power 5f triple (9/23/2015); and brain surgery.       Social History     Socioeconomic History    Marital status: Single     Spouse name: Not on file    Number of children: Not on file    Years of education: Not on file    Highest education level:

## 2023-08-28 NOTE — ED TRIAGE NOTES
Pt arrived to ED alert and oriented X4 via triage. Pt c/o left foot pain that as been going on for about a week. Pt states that she noticed her foot \"swelling\" but denies knowing why it started. Pt goes to a pediatrist regularly d/t her spurs and arthritis in her foot. Pt states that when she wakes up in the morning it does not change and stays the same. Pt denies SOB or chest pain. Pt denies any abdominal pain n/v/d. Pt resting comfortably and in no acute distress. Respirations even and nonlabored. Patient denies any needs at this time. Bed in lowest position. Call light within reach. Will continue to monitor.

## 2023-08-29 DIAGNOSIS — E78.00 PURE HYPERCHOLESTEROLEMIA: ICD-10-CM

## 2023-08-29 RX ORDER — ATORVASTATIN CALCIUM 80 MG/1
80 TABLET, FILM COATED ORAL DAILY
Qty: 30 TABLET | Refills: 0 | Status: SHIPPED | OUTPATIENT
Start: 2023-08-29

## 2023-08-29 NOTE — TELEPHONE ENCOUNTER
E-scribe request for LIPITOR. Please review and e-scribe if applicable.      Last Visit Date:  6/7/2023  Next Visit Date:  Visit date not found    Hemoglobin A1C (%)   Date Value   06/07/2023 6.3   04/05/2022 6.4   01/27/2020 5.4             ( goal A1C is < 7)   No components found for: LABMICR  LDL Cholesterol (mg/dL)   Date Value   04/05/2022 265 (H)     LDL Calculated (mg/dL)   Date Value   04/04/2014 253 (A)       (goal LDL is <100)   AST (U/L)   Date Value   04/05/2022 19     ALT (U/L)   Date Value   04/05/2022 16     BUN (mg/dL)   Date Value   04/05/2022 9     BP Readings from Last 3 Encounters:   08/28/23 (!) 133/91   06/07/23 122/73   03/24/23 127/77          (goal 120/80)        Patient Active Problem List:     Bipolar disorder ([address])     Scoliosis     Depression     Hyperlipemia     Essential hypertension     Prediabetes     Gastroesophageal reflux disease without esophagitis     Vitamin D deficiency     Mild episode of recurrent major depressive disorder (HCC)     Type 2 diabetes mellitus without complication, without long-term current use of insulin ([address])     Accidental poisoning by carbon monoxide      ----JF

## 2023-09-18 NOTE — TELEPHONE ENCOUNTER
Faxed Refill Request of Easy Touch Lancets received from [employer]. Medication Pended. Pt last seen on 6/7/2023, Next appt is unscheduled. Health Maintenance   Topic Date Due    Hepatitis B vaccine (1 of 3 - 3-dose series) Never done    COVID-19 Vaccine (1) Never done    Colorectal Cancer Screen  Never done    Pneumococcal 0-64 years Vaccine (2 - PCV) 11/14/2018    Shingles vaccine (1 of 2) Never done    Diabetic foot exam  06/01/2019    Breast cancer screen  01/11/2021    Diabetic retinal exam  02/18/2021    Diabetic Alb to Cr ratio (uACR) test  04/05/2023    Lipids  04/05/2023    GFR test (Diabetes, CKD 3-4, OR last GFR 15-59)  04/05/2023    Flu vaccine (1) 08/01/2023    A1C test (Diabetic or Prediabetic)  06/07/2024    Depression Monitoring  06/07/2024    DTaP/Tdap/Td vaccine (2 - Td or Tdap) 11/14/2027    Hepatitis C screen  Completed    HIV screen  Completed    Hepatitis A vaccine  Aged Out    Hib vaccine  Aged Out    Meningococcal (ACWY) vaccine  Aged Out       Hemoglobin A1C (%)   Date Value   06/07/2023 6.3   04/05/2022 6.4   01/27/2020 5.4             ( goal A1C is < 7)   No components found for: \"LABMICR\"  LDL Cholesterol (mg/dL)   Date Value   04/05/2022 265 (H)     LDL Calculated (mg/dL)   Date Value   04/04/2014 253 (A)       (goal LDL is <100)   AST (U/L)   Date Value   04/05/2022 19     ALT (U/L)   Date Value   04/05/2022 16     BUN (mg/dL)   Date Value   04/05/2022 9     BP Readings from Last 3 Encounters:   08/28/23 (!) 133/91   06/07/23 122/73   03/24/23 127/77          (goal 120/80)    All Future Testing planned in [facility]  Lab Frequency Next Occurrence   Comprehensive Metabolic Panel Once 05/31/6522   Lipid Panel Once 06/07/2023   Vitamin D 25 Hydroxy Once 06/07/2023   Hepatitis C RNA, Quantitative, PCR Once 06/07/2023       Next Visit Date:  No future appointments.          Patient Active Problem List:     Bipolar disorder ([address])     Scoliosis     Depression     Hyperlipemia

## 2023-09-21 ENCOUNTER — APPOINTMENT (OUTPATIENT)
Dept: GENERAL RADIOLOGY | Age: 53
End: 2023-09-21
Payer: COMMERCIAL

## 2023-09-21 ENCOUNTER — HOSPITAL ENCOUNTER (EMERGENCY)
Age: 53
Discharge: HOME | End: 2023-09-21
Attending: EMERGENCY MEDICINE
Payer: COMMERCIAL

## 2023-09-21 VITALS
TEMPERATURE: 97.1 F | WEIGHT: 122.58 LBS | OXYGEN SATURATION: 93 % | DIASTOLIC BLOOD PRESSURE: 79 MMHG | RESPIRATION RATE: 19 BRPM | HEART RATE: 88 BPM | HEIGHT: 65 IN | SYSTOLIC BLOOD PRESSURE: 141 MMHG | BODY MASS INDEX: 20.42 KG/M2

## 2023-09-21 DIAGNOSIS — M79.641 HAND PAIN, RIGHT: Primary | ICD-10-CM

## 2023-09-21 PROCEDURE — 6370000000 HC RX 637 (ALT 250 FOR IP): Performed by: STUDENT IN AN ORGANIZED HEALTH CARE EDUCATION/TRAINING PROGRAM

## 2023-09-21 PROCEDURE — 99283 EMERGENCY DEPT VISIT LOW MDM: CPT | Performed by: EMERGENCY MEDICINE

## 2023-09-21 PROCEDURE — 73110 X-RAY EXAM OF WRIST: CPT

## 2023-09-21 PROCEDURE — 73130 X-RAY EXAM OF HAND: CPT

## 2023-09-21 RX ORDER — ACETAMINOPHEN 500 MG
1000 TABLET ORAL ONCE
Status: COMPLETED | OUTPATIENT
Start: 2023-09-21 | End: 2023-09-21

## 2023-09-21 RX ORDER — ACETAMINOPHEN 500 MG
1000 TABLET ORAL EVERY 8 HOURS PRN
Qty: 21 TABLET | Refills: 0 | Status: SHIPPED | OUTPATIENT
Start: 2023-09-21 | End: 2023-09-28

## 2023-09-21 RX ORDER — LANCETS 30 GAUGE
EACH MISCELLANEOUS
Qty: 100 EACH | Refills: 2 | Status: SHIPPED | OUTPATIENT
Start: 2023-09-21

## 2023-09-21 RX ADMIN — ACETAMINOPHEN 1000 MG: 500 TABLET ORAL at 13:48

## 2023-09-21 ASSESSMENT — PAIN SCALES - GENERAL
PAINLEVEL_OUTOF10: 10
PAINLEVEL_OUTOF10: 10

## 2023-09-21 ASSESSMENT — PAIN DESCRIPTION - DESCRIPTORS: DESCRIPTORS: STABBING

## 2023-09-21 ASSESSMENT — PAIN DESCRIPTION - LOCATION: LOCATION: HAND

## 2023-09-21 ASSESSMENT — PAIN DESCRIPTION - ORIENTATION: ORIENTATION: RIGHT

## 2023-09-21 ASSESSMENT — PAIN - FUNCTIONAL ASSESSMENT: PAIN_FUNCTIONAL_ASSESSMENT: 0-10

## 2023-09-21 ASSESSMENT — PAIN DESCRIPTION - FREQUENCY: FREQUENCY: CONTINUOUS

## 2023-09-21 NOTE — ED PROVIDER NOTES
[address] [place] [address] ED  Emergency Department Encounter  Emergency Medicine Resident     Pt Name:Sonja Naranjo  MRN: [id number]  [address] [facility] 1970  Date of evaluation: 23  PCP:  Jonah Dudley MD  Note Started: 1:25 PM EDT      CHIEF COMPLAINT       Chief Complaint   Patient presents with    Swelling     RT hand     Hand Pain    Wrist Pain     left       HISTORY OF PRESENT ILLNESS  (Location/Symptom, Timing/Onset, Context/Setting, Quality, Duration, Modifying Factors, Severity.)      Flori Blanchard is a 46 y.o. female who presents with Pain and swelling in her right hand and wrist for 1 day. She does not think she injured it. She denies any fevers or chills. She denies ever having symptoms like this in the past.  She denies any history of IV drug use or gout. She has injured her wrist in the past.  She is right-hand dominant. PAST MEDICAL / SURGICAL / SOCIAL / FAMILY HISTORY      has a past medical history of Anxiety, Bipolar disorder ([address]), Brain aneurysm, Depression, Epilepsy ([address]), Essential hypertension, Gastroesophageal reflux disease without esophagitis, Headache, Hyperlipidemia, Liver disease, Manic depression ([address]), Pancreatitis, Scoliosis, and Seasonal allergies. has a past surgical history that includes hernia repair; Tonsillectomy; other surgical history (2015); Hysterectomy; hc picc power 5f triple (2015); and brain surgery.     Social History     Socioeconomic History    Marital status: Single     Spouse name: Not on file    Number of children: Not on file    Years of education: Not on file    Highest education level: Not on file   Occupational History    Not on file   Tobacco Use    Smoking status: Every Day     Packs/day: 0.25     Years: 40.00     Additional pack years: 0.00     Total pack years: 10.00     Types: Cigarettes     Last attempt to quit: 2015     Years since quittin.0    Smokeless tobacco: Never   Substance and Sexual Activity    Alcohol

## 2023-09-21 NOTE — ED NOTES
Patient presents to ED for right hand and wrist pain and swelling. Patient denies any known injury to the area. Reports she did have surgery on that area many years ago (cannot remember when) but has not had any problems since. Patient a/o x 4 with even non labored respirations, speaking in complete sentences, NAD noted. Resting on cot. Lubricant used to remove rings from patient's right ring finger and index finger.      Jojo Brown LPN  93/58/55 0905

## 2023-09-26 DIAGNOSIS — E78.00 PURE HYPERCHOLESTEROLEMIA: ICD-10-CM

## 2023-09-26 NOTE — TELEPHONE ENCOUNTER
Please address the medication refill and close the encounter. If I can be of assistance, please route to the applicable pool. Thank you.       Last visit: 6-7-23  Last Med refill: 8-29-23  Does patient have enough medication for 72 hours: No:     Next Visit Date:  Future Appointments   Date Time Provider [address]   9/29/2023  3:15 PM Juan Daniel Shah MD [address] [place] [address],[place] [address] Maintenance   Topic Date Due    Hepatitis B vaccine (1 of 3 - 3-dose series) Never done    COVID-19 Vaccine (1) Never done    Colorectal Cancer Screen  Never done    Pneumococcal 0-64 years Vaccine (2 - PCV) 11/14/2018    Shingles vaccine (1 of 2) Never done    Diabetic foot exam  06/01/2019    Breast cancer screen  01/11/2021    Diabetic retinal exam  02/18/2021    Diabetic Alb to Cr ratio (uACR) test  04/05/2023    Lipids  04/05/2023    GFR test (Diabetes, CKD 3-4, OR last GFR 15-59)  04/05/2023    Flu vaccine (1) 08/01/2023    A1C test (Diabetic or Prediabetic)  06/07/2024    Depression Monitoring  06/07/2024    DTaP/Tdap/Td vaccine (2 - Td or Tdap) 11/14/2027    Hepatitis C screen  Completed    HIV screen  Completed    Hepatitis A vaccine  Aged Out    Hib vaccine  Aged Out    Meningococcal (ACWY) vaccine  Aged Out       Hemoglobin A1C (%)   Date Value   06/07/2023 6.3   04/05/2022 6.4   01/27/2020 5.4             ( goal A1C is < 7)   No components found for: \"LABMICR\"  LDL Cholesterol (mg/dL)   Date Value   04/05/2022 265 (H)   06/01/2018 141 (H)     LDL Calculated (mg/dL)   Date Value   04/04/2014 253 (A)       (goal LDL is <100)   AST (U/L)   Date Value   04/05/2022 19     ALT (U/L)   Date Value   04/05/2022 16     BUN (mg/dL)   Date Value   04/05/2022 9     BP Readings from Last 3 Encounters:   09/21/23 (!) 141/79   08/28/23 (!) 133/91   06/07/23 122/73          (goal 120/80)    All Future Testing planned in [facility]  Lab Frequency Next Occurrence   Comprehensive Metabolic Panel Once 40/54/5215   Lipid Panel Once

## 2023-09-28 RX ORDER — ATORVASTATIN CALCIUM 80 MG/1
80 TABLET, FILM COATED ORAL DAILY
Qty: 30 TABLET | Refills: 5 | Status: SHIPPED | OUTPATIENT
Start: 2023-09-28

## 2023-10-09 ENCOUNTER — TELEPHONE (OUTPATIENT)
Dept: FAMILY MEDICINE CLINIC | Age: 53
End: 2023-10-09

## 2023-10-09 NOTE — TELEPHONE ENCOUNTER
PC from [facility] stating that patient has been having increased leg pain but only wanted to see Dr. Yuval Rm. Appt scheduled with next opening in November, requesting medication to help in the mean time if applicable. Phone number and pharmacy confirmed.

## 2023-10-11 NOTE — TELEPHONE ENCOUNTER
PC to pt, notified that no scripts will be able to be filled until examined in office and offered sooner appt with another resident on provider's care team, pt expressed understanding and denied sooner appt stating she would rather just wait to see her doctor and will call us back if it gets too intolerable beforehand. Writer confirmed call back number with pt before disconnecting.

## 2023-11-09 ENCOUNTER — HOSPITAL ENCOUNTER (OUTPATIENT)
Age: 53
Setting detail: SPECIMEN
Discharge: HOME | End: 2023-11-09

## 2023-11-09 ENCOUNTER — OFFICE VISIT (OUTPATIENT)
Dept: FAMILY MEDICINE CLINIC | Age: 53
End: 2023-11-09
Payer: COMMERCIAL

## 2023-11-09 VITALS
SYSTOLIC BLOOD PRESSURE: 110 MMHG | HEIGHT: 66 IN | BODY MASS INDEX: 20.89 KG/M2 | HEART RATE: 97 BPM | WEIGHT: 130 LBS | DIASTOLIC BLOOD PRESSURE: 80 MMHG

## 2023-11-09 DIAGNOSIS — K21.9 GASTROESOPHAGEAL REFLUX DISEASE WITHOUT ESOPHAGITIS: ICD-10-CM

## 2023-11-09 DIAGNOSIS — Z23 ENCOUNTER FOR VACCINATION: ICD-10-CM

## 2023-11-09 DIAGNOSIS — R76.8 HEPATITIS C ANTIBODY POSITIVE IN BLOOD: ICD-10-CM

## 2023-11-09 DIAGNOSIS — R76.8 HEPATITIS C ANTIBODY POSITIVE IN BLOOD: Primary | ICD-10-CM

## 2023-11-09 DIAGNOSIS — R73.03 PREDIABETES: ICD-10-CM

## 2023-11-09 PROCEDURE — 83036 HEMOGLOBIN GLYCOSYLATED A1C: CPT

## 2023-11-09 RX ORDER — OMEPRAZOLE 20 MG/1
20 CAPSULE, DELAYED RELEASE ORAL DAILY
Qty: 30 CAPSULE | Refills: 3 | Status: SHIPPED | OUTPATIENT
Start: 2023-11-09

## 2023-11-09 RX ORDER — MIRTAZAPINE 15 MG/1
TABLET, FILM COATED ORAL
COMMUNITY
Start: 2023-11-02

## 2023-11-09 RX ORDER — HYDROXYZINE PAMOATE 25 MG/1
25 CAPSULE ORAL 3 TIMES DAILY
Status: CANCELLED | OUTPATIENT
Start: 2023-11-09

## 2023-11-09 RX ORDER — HYDROXYZINE PAMOATE 50 MG/1
CAPSULE ORAL
Status: CANCELLED | OUTPATIENT
Start: 2023-11-09

## 2023-11-09 RX ORDER — HYDROXYZINE PAMOATE 50 MG/1
CAPSULE ORAL
COMMUNITY
Start: 2023-11-02

## 2023-11-09 SDOH — ECONOMIC STABILITY: FOOD INSECURITY: WITHIN THE PAST 12 MONTHS, YOU WORRIED THAT YOUR FOOD WOULD RUN OUT BEFORE YOU GOT MONEY TO BUY MORE.: NEVER TRUE

## 2023-11-09 SDOH — ECONOMIC STABILITY: INCOME INSECURITY: HOW HARD IS IT FOR YOU TO PAY FOR THE VERY BASICS LIKE FOOD, HOUSING, MEDICAL CARE, AND HEATING?: NOT HARD AT ALL

## 2023-11-09 SDOH — ECONOMIC STABILITY: FOOD INSECURITY: WITHIN THE PAST 12 MONTHS, THE FOOD YOU BOUGHT JUST DIDN'T LAST AND YOU DIDN'T HAVE MONEY TO GET MORE.: NEVER TRUE

## 2023-11-09 SDOH — ECONOMIC STABILITY: HOUSING INSECURITY
IN THE LAST 12 MONTHS, WAS THERE A TIME WHEN YOU DID NOT HAVE A STEADY PLACE TO SLEEP OR SLEPT IN A SHELTER (INCLUDING NOW)?: NO

## 2023-11-09 ASSESSMENT — ENCOUNTER SYMPTOMS
VOMITING: 0
CONSTIPATION: 0
DIARRHEA: 0
NAUSEA: 0
ABDOMINAL PAIN: 0
SHORTNESS OF BREATH: 0
CHEST TIGHTNESS: 0

## 2023-11-09 NOTE — PROGRESS NOTES
Visit Information    Have you changed or started any medications since your last visit including any over-the-counter medicines, vitamins, or herbal medicines? no   Have you stopped taking any of your medications? Is so, why? -  no  Are you having any side effects from any of your medications? - no    Have you seen any other physician or provider since your last visit?  no   Have you had any other diagnostic tests since your last visit? yes - Labs - Xray   Have you been seen in the emergency room and/or had an admission in a hospital since we last saw you?  yes - [facility]   Have you had your routine dental cleaning in the past 6 months?  no     Do you have an active MyChart account? If no, what is the barrier?   Yes    Patient Care Team:  Bro Howe MD as PCP - General (Family Medicine)    Medical History Review  Past Medical, Family, and Social History reviewed and does not contribute to the patient presenting condition    Health Maintenance   Topic Date Due    Hepatitis B vaccine (1 of 3 - 3-dose series) Never done    COVID-19 Vaccine (1) Never done    Colorectal Cancer Screen  Never done    Pneumococcal 0-64 years Vaccine (2 - PCV) 11/14/2018    Shingles vaccine (1 of 2) Never done    Diabetic foot exam  06/01/2019    Breast cancer screen  01/11/2021    Diabetic retinal exam  02/18/2021    Diabetic Alb to Cr ratio (uACR) test  04/05/2023    Lipids  04/05/2023    GFR test (Diabetes, CKD 3-4, OR last GFR 15-59)  04/05/2023    Flu vaccine (1) 08/01/2023    A1C test (Diabetic or Prediabetic)  06/07/2024    Depression Monitoring  06/07/2024    DTaP/Tdap/Td vaccine (2 - Td or Tdap) 11/14/2027    Hepatitis C screen  Completed    HIV screen  Completed    Hepatitis A vaccine  Aged Out    Hib vaccine  Aged Out    Meningococcal (ACWY) vaccine  Aged Out

## 2023-11-10 PROCEDURE — 90686 IIV4 VACC NO PRSV 0.5 ML IM: CPT | Performed by: FAMILY MEDICINE

## 2023-11-13 LAB
HCV RNA # SERPL NAA+PROBE: NOT DETECTED {COPIES}/ML
SPECIMEN SOURCE: NORMAL

## 2023-11-29 DIAGNOSIS — E11.9 TYPE 2 DIABETES MELLITUS WITHOUT COMPLICATION, WITHOUT LONG-TERM CURRENT USE OF INSULIN (HCC): ICD-10-CM

## 2023-11-29 NOTE — TELEPHONE ENCOUNTER
Last visit: 11/9/23  Last Med refill: 4/19/23  Does patient have enough medication for 72 hours: no    Next Visit Date:  No future appointments.     Health Maintenance   Topic Date Due    Hepatitis B vaccine (1 of 3 - 3-dose series) Never done    COVID-19 Vaccine (1) Never done    Colorectal Cancer Screen  Never done    Pneumococcal 0-64 years Vaccine (2 - PCV) 11/14/2018    Shingles vaccine (1 of 2) Never done    Diabetic foot exam  06/01/2019    Breast cancer screen  01/11/2021    Diabetic retinal exam  02/18/2021    Diabetic Alb to Cr ratio (uACR) test  04/05/2023    Lipids  04/05/2023    GFR test (Diabetes, CKD 3-4, OR last GFR 15-59)  04/05/2023    A1C test (Diabetic or Prediabetic)  06/07/2024    Depression Monitoring  06/07/2024    DTaP/Tdap/Td vaccine (2 - Td or Tdap) 11/14/2027    Flu vaccine  Completed    Hepatitis C screen  Completed    HIV screen  Completed    Hepatitis A vaccine  Aged Out    Hib vaccine  Aged Out    Meningococcal (ACWY) vaccine  Aged Out       Hemoglobin A1C (%)   Date Value   06/07/2023 6.3   04/05/2022 6.4   01/27/2020 5.4             ( goal A1C is < 7)   No components found for: \"LABMICR\"  LDL Cholesterol (mg/dL)   Date Value   04/05/2022 265 (H)   06/01/2018 141 (H)     LDL Calculated (mg/dL)   Date Value   04/04/2014 253 (A)       (goal LDL is <100)   AST (U/L)   Date Value   04/05/2022 19     ALT (U/L)   Date Value   04/05/2022 16     BUN (mg/dL)   Date Value   04/05/2022 9     BP Readings from Last 3 Encounters:   11/09/23 110/80   09/21/23 (!) 141/79   08/28/23 (!) 133/91          (goal 120/80)    All Future Testing planned in [facility]  Lab Frequency Next Occurrence   Comprehensive Metabolic Panel Once 63/83/7249   Lipid Panel Once 06/07/2023   Vitamin D 25 Hydroxy Once 06/07/2023               Patient Active Problem List:     Bipolar disorder ([address])     Scoliosis     Depression     Hyperlipemia     Essential hypertension     Prediabetes     Gastroesophageal reflux disease without

## 2024-01-05 DIAGNOSIS — F17.200 SMOKER: ICD-10-CM

## 2024-01-05 DIAGNOSIS — R05.3 CHRONIC COUGH: ICD-10-CM

## 2024-01-08 NOTE — TELEPHONE ENCOUNTER
Gastroesophageal reflux disease without esophagitis     Vitamin D deficiency     Mild episode of recurrent major depressive disorder (HCC)     Type 2 diabetes mellitus without complication, without long-term current use of insulin (HCC)     Accidental poisoning by carbon monoxide

## 2024-01-09 RX ORDER — TIOTROPIUM BROMIDE 18 UG/1
18 CAPSULE ORAL; RESPIRATORY (INHALATION) DAILY
Qty: 30 CAPSULE | Refills: 5 | Status: SHIPPED | OUTPATIENT
Start: 2024-01-09

## 2024-02-05 DIAGNOSIS — I10 ESSENTIAL HYPERTENSION: ICD-10-CM

## 2024-02-06 NOTE — TELEPHONE ENCOUNTER
E-scribe request for LISINOPR[facility]. Please review and e-scribe if applicable.     Last Visit Date:  11/9/2023  Next Visit Date:  Visit date not found    Hemoglobin A1C (%)   Date Value   06/07/2023 6.3   04/05/2022 6.4   01/27/2020 5.4             ( goal A1C is < 7)   No components found for: \"LABMICR\"  LDL Cholesterol (mg/dL)   Date Value   04/05/2022 265 (H)     LDL Calculated (mg/dL)   Date Value   04/04/2014 253 (A)       (goal LDL is <100)   AST (U/L)   Date Value   04/05/2022 19     ALT (U/L)   Date Value   04/05/2022 16     BUN (mg/dL)   Date Value   04/05/2022 9     BP Readings from Last 3 Encounters:   11/09/23 110/80   09/21/23 (!) 141/79   08/28/23 (!) 133/91          (goal 120/80)        Patient Active Problem List:     Bipolar disorder (HCC)     Scoliosis     Depression     Hyperlipemia     Essential hypertension     Prediabetes     Gastroesophageal reflux disease without esophagitis     Vitamin D deficiency     Mild episode of recurrent major depressive disorder (HCC)     Type 2 diabetes mellitus without complication, without long-term current use of insulin (HCC)     Accidental poisoning by carbon monoxide      ----JF

## 2024-02-08 RX ORDER — LISINOPRIL 5 MG/1
5 TABLET ORAL DAILY
Qty: 30 TABLET | Refills: 5 | Status: SHIPPED | OUTPATIENT
Start: 2024-02-08

## 2024-03-08 DIAGNOSIS — K21.9 GASTROESOPHAGEAL REFLUX DISEASE WITHOUT ESOPHAGITIS: ICD-10-CM

## 2024-03-11 NOTE — TELEPHONE ENCOUNTER
Last visit: 11/09/2023  Last Med refill: 11/09/2023  Does patient have enough medication for 72 hours: No:     Next Visit Date:  No future appointments.    Health Maintenance   Topic Date Due    Hepatitis B vaccine (1 of 3 - 3-dose series) Never done    COVID-19 Vaccine (1) Never done    Colorectal Cancer Screen  Never done    Pneumococcal 0-64 years Vaccine (2 - PCV) 11/14/2018    Shingles vaccine (1 of 2) Never done    Diabetic foot exam  06/01/2019    Breast cancer screen  01/11/2021    Diabetic retinal exam  02/18/2021    Diabetic Alb to Cr ratio (uACR) test  04/05/2023    Lipids  04/05/2023    GFR test (Diabetes, CKD 3-4, OR last GFR 15-59)  04/05/2023    A1C test (Diabetic or Prediabetic)  06/07/2024    Depression Monitoring  06/07/2024    DTaP/Tdap/Td vaccine (2 - Td or Tdap) 11/14/2027    Flu vaccine  Completed    Hepatitis C screen  Completed    HIV screen  Completed    Hepatitis A vaccine  Aged Out    Hib vaccine  Aged Out    Polio vaccine  Aged Out    Meningococcal (ACWY) vaccine  Aged Out       Hemoglobin A1C (%)   Date Value   06/07/2023 6.3   04/05/2022 6.4   01/27/2020 5.4             ( goal A1C is < 7)   No components found for: \"LABMICR\"  LDL Cholesterol (mg/dL)   Date Value   04/05/2022 265 (H)   06/01/2018 141 (H)     LDL Calculated (mg/dL)   Date Value   04/04/2014 253 (A)       (goal LDL is <100)   AST (U/L)   Date Value   04/05/2022 19     ALT (U/L)   Date Value   04/05/2022 16     BUN (mg/dL)   Date Value   04/05/2022 9     BP Readings from Last 3 Encounters:   11/09/23 110/80   09/21/23 (!) 141/79   08/28/23 (!) 133/91          (goal 120/80)    All Future Testing planned in [facility]  Lab Frequency Next Occurrence   Comprehensive Metabolic Panel Once 06/07/2023   Lipid Panel Once 06/07/2023   Vitamin D 25 Hydroxy Once 06/07/2023               Patient Active Problem List:     Bipolar disorder (HCC)     Scoliosis     Depression     Hyperlipemia     Essential hypertension     Prediabetes

## 2024-03-12 RX ORDER — OMEPRAZOLE 20 MG/1
20 CAPSULE, DELAYED RELEASE ORAL DAILY
Qty: 30 CAPSULE | Refills: 3 | Status: SHIPPED | OUTPATIENT
Start: 2024-03-12

## 2024-03-20 DIAGNOSIS — R05.3 CHRONIC COUGH: ICD-10-CM

## 2024-03-20 DIAGNOSIS — E11.9 TYPE 2 DIABETES MELLITUS WITHOUT COMPLICATION, WITHOUT LONG-TERM CURRENT USE OF INSULIN (HCC): ICD-10-CM

## 2024-03-21 RX ORDER — ALBUTEROL SULFATE 90 UG/1
AEROSOL, METERED RESPIRATORY (INHALATION)
Qty: 18 G | Refills: 0 | Status: SHIPPED | OUTPATIENT
Start: 2024-03-21

## 2024-03-21 RX ORDER — LANCETS 30 GAUGE
EACH MISCELLANEOUS
Qty: 100 EACH | Refills: 2 | Status: SHIPPED | OUTPATIENT
Start: 2024-03-21

## 2024-03-21 NOTE — TELEPHONE ENCOUNTER
Last visit: 11/9/23  Last Med refill: 11/29/23  Does patient have enough medication for 72 hours: No:     Next Visit Date:  Future Appointments   Date Time Provider Department Center   4/12/2024  4:00 PM Marcy Chowdh[facility] MD [facility]       Health Maintenance   Topic Date Due    Hepatitis B vaccine (1 of 3 - 3-dose series) Never done    COVID-19 Vaccine (1) Never done    Colorectal Cancer Screen  Never done    Pneumococcal 0-64 years Vaccine (2 of 2 - PCV) 11/14/2018    Shingles vaccine (1 of 2) Never done    Diabetic foot exam  06/01/2019    Breast cancer screen  01/11/2021    Diabetic retinal exam  02/18/2021    Diabetic Alb to Cr ratio (uACR) test  04/05/2023    Lipids  04/05/2023    GFR test (Diabetes, CKD 3-4, OR last GFR 15-59)  04/05/2023    A1C test (Diabetic or Prediabetic)  06/07/2024    Depression Monitoring  06/07/2024    DTaP/Tdap/Td vaccine (2 - Td or Tdap) 11/14/2027    Flu vaccine  Completed    Hepatitis C screen  Completed    HIV screen  Completed    Hepatitis A vaccine  Aged Out    Hib vaccine  Aged Out    Polio vaccine  Aged Out    Meningococcal (ACWY) vaccine  Aged Out       Hemoglobin A1C (%)   Date Value   06/07/2023 6.3   04/05/2022 6.4   01/27/2020 5.4             ( goal A1C is < 7)   No components found for: \"LABMICR\"  LDL Cholesterol (mg/dL)   Date Value   04/05/2022 265 (H)   06/01/2018 141 (H)     LDL Calculated (mg/dL)   Date Value   04/04/2014 253 (A)       (goal LDL is <100)   AST (U/L)   Date Value   04/05/2022 19     ALT (U/L)   Date Value   04/05/2022 16     BUN (mg/dL)   Date Value   04/05/2022 9     BP Readings from Last 3 Encounters:   11/09/23 110/80   09/21/23 (!) 141/79   08/28/23 (!) 133/91          (goal 120/80)    All Future Testing planned in [facility]  Lab Frequency Next Occurrence   Comprehensive Metabolic Panel Once 06/07/2023   Lipid Panel Once 06/07/2023   Vitamin D 25 Hydroxy Once 06/07/2023               Patient Active Problem List:     Bipolar disorder

## 2024-03-21 NOTE — TELEPHONE ENCOUNTER
Scoliosis     Depression     Hyperlipemia     Essential hypertension     Prediabetes     Gastroesophageal reflux disease without esophagitis     Vitamin D deficiency     Mild episode of recurrent major depressive disorder (HCC)     Type 2 diabetes mellitus without complication, without long-term current use of insulin (HCC)     Accidental poisoning by carbon monoxide

## 2024-03-29 DIAGNOSIS — E78.00 PURE HYPERCHOLESTEROLEMIA: ICD-10-CM

## 2024-04-01 DIAGNOSIS — R73.03 PREDIABETES: ICD-10-CM

## 2024-04-01 RX ORDER — ATORVASTATIN CALCIUM 80 MG/1
80 TABLET, FILM COATED ORAL DAILY
Qty: 30 TABLET | Refills: 2 | Status: SHIPPED | OUTPATIENT
Start: 2024-04-01

## 2024-04-01 RX ORDER — GLUCOSAMINE HCL/CHONDROITIN SU 500-400 MG
CAPSULE ORAL
Qty: 300 STRIP | Refills: 1 | Status: SHIPPED | OUTPATIENT
Start: 2024-04-01

## 2024-04-01 NOTE — TELEPHONE ENCOUNTER
Please address the medication refill and close the encounter.  If I can be of assistance, please route to the applicable pool.      Thank you.      Last visit: 11-9-23  Last Med refill: 9-  Does patient have enough medication for 72 hours: No:     Next Visit Date:  Future Appointments   Date Time Provider Department Center   4/12/2024  4:00 PM [facility]       Health Maintenance   Topic Date Due    Hepatitis B vaccine (1 of 3 - 3-dose series) Never done    COVID-19 Vaccine (1) Never done    Colorectal Cancer Screen  Never done    Pneumococcal 0-64 years Vaccine (2 of 2 - PCV) 11/14/2018    Shingles vaccine (1 of 2) Never done    Diabetic foot exam  06/01/2019    Breast cancer screen  01/11/2021    Diabetic retinal exam  02/18/2021    Diabetic Alb to Cr ratio (uACR) test  04/05/2023    Lipids  04/05/2023    GFR test (Diabetes, CKD 3-4, OR last GFR 15-59)  04/05/2023    A1C test (Diabetic or Prediabetic)  06/07/2024    Depression Monitoring  06/07/2024    DTaP/Tdap/Td vaccine (2 - Td or Tdap) 11/14/2027    Flu vaccine  Completed    Hepatitis C screen  Completed    HIV screen  Completed    Hepatitis A vaccine  Aged Out    Hib vaccine  Aged Out    Polio vaccine  Aged Out    Meningococcal (ACWY) vaccine  Aged Out       Hemoglobin A1C (%)   Date Value   06/07/2023 6.3   04/05/2022 6.4   01/27/2020 5.4             ( goal A1C is < 7)   No components found for: \"LABMICR\"  LDL Cholesterol (mg/dL)   Date Value   04/05/2022 265 (H)   06/01/2018 141 (H)     LDL Calculated (mg/dL)   Date Value   04/04/2014 253 (A)       (goal LDL is <100)   AST (U/L)   Date Value   04/05/2022 19     ALT (U/L)   Date Value   04/05/2022 16     BUN (mg/dL)   Date Value   04/05/2022 9     BP Readings from Last 3 Encounters:   11/09/23 110/80   09/21/23 (!) 141/79   08/28/23 (!) 133/91          (goal 120/80)    All Future Testing planned in [facility]  Lab Frequency Next Occurrence   Comprehensive Metabolic Panel Once

## 2024-04-01 NOTE — TELEPHONE ENCOUNTER
Last visit: 11/09/2023  Last Med refill: 06/07/2023  Does patient have enough medication for 72 hours: No:  TRUE MATRIX TESTING STRIPS    Next Visit Date:  Future Appointments   Date Time Provider Department Center   4/12/2024  4:00 PM Marcy Chowdhury MD [facility]       Health Maintenance   Topic Date Due    Hepatitis B vaccine (1 of 3 - 3-dose series) Never done    COVID-19 Vaccine (1) Never done    Colorectal Cancer Screen  Never done    Pneumococcal 0-64 years Vaccine (2 of 2 - PCV) 11/14/2018    Shingles vaccine (1 of 2) Never done    Diabetic foot exam  06/01/2019    Breast cancer screen  01/11/2021    Diabetic retinal exam  02/18/2021    Diabetic Alb to Cr ratio (uACR) test  04/05/2023    Lipids  04/05/2023    GFR test (Diabetes, CKD 3-4, OR last GFR 15-59)  04/05/2023    A1C test (Diabetic or Prediabetic)  06/07/2024    Depression Monitoring  06/07/2024    DTaP/Tdap/Td vaccine (2 - Td or Tdap) 11/14/2027    Flu vaccine  Completed    Hepatitis C screen  Completed    HIV screen  Completed    Hepatitis A vaccine  Aged Out    Hib vaccine  Aged Out    Polio vaccine  Aged Out    Meningococcal (ACWY) vaccine  Aged Out       Hemoglobin A1C (%)   Date Value   06/07/2023 6.3   04/05/2022 6.4   01/27/2020 5.4             ( goal A1C is < 7)   No components found for: \"LABMICR\"  LDL Cholesterol (mg/dL)   Date Value   04/05/2022 265 (H)   06/01/2018 141 (H)     LDL Calculated (mg/dL)   Date Value   04/04/2014 253 (A)       (goal LDL is <100)   AST (U/L)   Date Value   04/05/2022 19     ALT (U/L)   Date Value   04/05/2022 16     BUN (mg/dL)   Date Value   04/05/2022 9     BP Readings from Last 3 Encounters:   11/09/23 110/80   09/21/23 (!) 141/79   08/28/23 (!) 133/91          (goal 120/80)    All Future Testing planned in [facility]  Lab Frequency Next Occurrence   Comprehensive Metabolic Panel Once 06/07/2023   Lipid Panel Once 06/07/2023   Vitamin D 25 Hydroxy Once 06/07/2023               Patient Active Problem

## 2024-05-09 DIAGNOSIS — R73.03 PREDIABETES: ICD-10-CM

## 2024-05-09 RX ORDER — UBIQUINOL 100 MG
1 CAPSULE ORAL 2 TIMES DAILY
Qty: 100 EACH | Refills: 2 | Status: SHIPPED | OUTPATIENT
Start: 2024-05-09

## 2024-05-09 NOTE — TELEPHONE ENCOUNTER
episode of recurrent major depressive disorder (HCC)     Type 2 diabetes mellitus without complication, without long-term current use of insulin (HCC)     Accidental poisoning by carbon monoxide

## 2024-06-14 RX ORDER — LANCETS 30 GAUGE
EACH MISCELLANEOUS
Qty: 100 EACH | Refills: 2 | Status: SHIPPED | OUTPATIENT
Start: 2024-06-14

## 2024-06-23 ENCOUNTER — HOSPITAL ENCOUNTER (EMERGENCY)
Age: 54
Discharge: HOME | End: 2024-06-23
Attending: EMERGENCY MEDICINE
Payer: COMMERCIAL

## 2024-06-23 VITALS
DIASTOLIC BLOOD PRESSURE: 95 MMHG | TEMPERATURE: 96.6 F | HEART RATE: 112 BPM | SYSTOLIC BLOOD PRESSURE: 153 MMHG | OXYGEN SATURATION: 98 % | RESPIRATION RATE: 16 BRPM

## 2024-06-23 DIAGNOSIS — S05.02XA ABRASION OF LEFT CORNEA, INITIAL ENCOUNTER: Primary | ICD-10-CM

## 2024-06-23 PROCEDURE — 99283 EMERGENCY DEPT VISIT LOW MDM: CPT

## 2024-06-23 PROCEDURE — 6370000000 HC RX 637 (ALT 250 FOR IP): Performed by: STUDENT IN AN ORGANIZED HEALTH CARE EDUCATION/TRAINING PROGRAM

## 2024-06-23 RX ORDER — ERYTHROMYCIN 5 MG/G
OINTMENT OPHTHALMIC
Qty: 3.5 G | Refills: 0 | Status: SHIPPED | OUTPATIENT
Start: 2024-06-23 | End: 2024-07-03

## 2024-06-23 RX ORDER — TETRACAINE HYDROCHLORIDE 5 MG/ML
1 SOLUTION OPHTHALMIC ONCE
Status: COMPLETED | OUTPATIENT
Start: 2024-06-23 | End: 2024-06-23

## 2024-06-23 RX ADMIN — FLUORESCEIN SODIUM 1 MG: 1 STRIP OPHTHALMIC at 15:16

## 2024-06-23 RX ADMIN — TETRACAINE HYDROCHLORIDE 1 DROP: 5 SOLUTION OPHTHALMIC at 15:16

## 2024-06-23 ASSESSMENT — VISUAL ACUITY
OU: 20/50
OS: 20/200
OD: 20/40

## 2024-06-23 ASSESSMENT — LIFESTYLE VARIABLES
HOW OFTEN DO YOU HAVE A DRINK CONTAINING ALCOHOL: MONTHLY OR LESS
HOW MANY STANDARD DRINKS CONTAINING ALCOHOL DO YOU HAVE ON A TYPICAL DAY: 1 OR 2

## 2024-06-23 NOTE — DISCHARGE INSTRUCTIONS
-You were seen and evaluated by emergency medicine physicians at [facility].    -Please follow-up with your primary care physician and/or with the referrals to specialist.    -You were diagnosed with: Corneal abrasion    - You have a corneal abrasion of the Left Eye.   - Please use the eye antibiotics as prescribed. Please follow up with your ophthalmologist in 2 days.  Please mention to them that you were seen in the ED and diagnosed with a corneal abrasion and started on antibiotics.    -Please return to the Emergency Department if you are experiencing the following symptoms acutely: Worsening eye pain, Headache, fever, chills, nausea, vomiting, chest pain, shortness of breath, abdominal pain, change with urination, change with bowel movements, change in your skin/hair/nail, weakness, fatigue, altered mental status and/or any change from baseline health.    -Thank you for coming to [facility].    
Patient baseline mental status/Awake

## 2024-06-23 NOTE — ED NOTES
Pressure behind left eye x 1 1/2 weeks with no trauma. Eye has pressure and is painful but pt has not taken any meds for it. When it first started she thought it was due to a sinus infection but never got better and now is more painful. States the vision to the eye is blurry. Does wear corrective lenses for sight. No other complaints. Denies a FB to eye and is no know injury. Eye is red in the sclera.

## 2024-06-23 NOTE — ED PROVIDER NOTES
[facility] ED     Emergency Department     Faculty Attestation    I performed a history and physical examination of the patient and discussed management with the resident. I reviewed the resident’s note and agree with the documented findings and plan of care. Any areas of disagreement are noted on the chart. I was personally present for the key portions of any procedures. I have documented in the chart those procedures where I was not present during the key portions. I have reviewed the emergency nurses triage note. I agree with the chief complaint, past medical history, past surgical history, allergies, medications, social and family history as documented unless otherwise noted below. For Physician Assistant/ Nurse Practitioner cases/documentation I have personally evaluated this patient and have completed at least one if not all key elements of the E/M (history, physical exam, and MDM). Additional findings are as noted.    Note Started: 3:30 PM EDT    Patient here with left eye pain injection redness some discharge tearing.  No injury no trauma does not wear contacts.  No other complaints.  Is a diabetic states sugars been well-controlled of late.  On exam there is mild injection of the sclera but there is perilimbal sparing.  There is direct photophobia but no consensual photophobia.  Normal pupil equal round reactive full extraocular movements.  No periorbital erythema or edema.  On slit-lamp exam anterior chamber is clear and wide open.  On fluorescein staining there is a small area of uptake superficial abrasion at 7-8 o'clock over the iris but not overlying the pupil, not dendritic.  Santy negative..  Will place on antibiotics she does have an eye clinic with whom she follows call tomorrow for recheck in the next 1 to 2 days.  Patient and her  voiced understanding of reasons to return      Critical Care     none    Santy Tony MD, FACEP, FAAEM  Attending Emergency  
    DISPOSITION Decision To Discharge 06/23/2024 03:29:56 PM      PATIENT REFERRED TO:  [facility] ED  [address] [address]  [phone]  Go to   As needed, If symptoms worsen    Ophthalmology    Go on 6/25/2024  For reevaluation      DISCHARGE MEDICATIONS:  Discharge Medication List as of 6/23/2024  3:39 PM        START taking these medications    Details   erythromycin (ROMYCIN) 5 MG/GM ophthalmic ointment Apply 2 drops in Left eye four times per day for 5 days, Disp-3.5 g, R-0, Print             Jared San DO  Emergency Medicine Resident    (Please note that portions of this note were completed with a voice recognition program.  Efforts were made to edit the dictations but occasionally words are mis-transcribed.)

## 2024-07-03 DIAGNOSIS — R05.3 CHRONIC COUGH: ICD-10-CM

## 2024-07-03 DIAGNOSIS — F17.200 SMOKER: ICD-10-CM

## 2024-07-03 DIAGNOSIS — E78.00 PURE HYPERCHOLESTEROLEMIA: ICD-10-CM

## 2024-07-03 DIAGNOSIS — K21.9 GASTROESOPHAGEAL REFLUX DISEASE WITHOUT ESOPHAGITIS: ICD-10-CM

## 2024-07-05 RX ORDER — TIOTROPIUM BROMIDE 18 UG/1
18 CAPSULE ORAL; RESPIRATORY (INHALATION) DAILY
Qty: 30 CAPSULE | Refills: 5 | Status: SHIPPED | OUTPATIENT
Start: 2024-07-05

## 2024-07-05 RX ORDER — ATORVASTATIN CALCIUM 80 MG/1
80 TABLET, FILM COATED ORAL DAILY
Qty: 30 TABLET | Refills: 2 | Status: SHIPPED | OUTPATIENT
Start: 2024-07-05

## 2024-07-05 RX ORDER — OMEPRAZOLE 20 MG/1
20 CAPSULE, DELAYED RELEASE ORAL DAILY
Qty: 30 CAPSULE | Refills: 3 | Status: SHIPPED | OUTPATIENT
Start: 2024-07-05

## 2024-07-05 NOTE — TELEPHONE ENCOUNTER
PCP NO LONGER IN OFFICE, ROUTING TO PRIOR CARE TEAM    E-scribe request for PENDED MEDICATIONS. Please review and e-scribe if applicable.     Last Visit Date:  11/9/23  Next Visit Date:  Visit date not found    Hemoglobin A1C (%)   Date Value   06/07/2023 6.3   04/05/2022 6.4   01/27/2020 5.4             ( goal A1C is < 7)   No components found for: \"LABMICR\"  No components found for: \"LDLCHOLESTEROL\", \"LDLCALC\"    (goal LDL is <100)   AST (U/L)   Date Value   04/05/2022 19     ALT (U/L)   Date Value   04/05/2022 16     BUN (mg/dL)   Date Value   04/05/2022 9     BP Readings from Last 3 Encounters:   06/23/24 (!) 153/95   11/09/23 110/80   09/21/23 (!) 141/79          (goal 120/80)        Patient Active Problem List:     Bipolar disorder (HCC)     Scoliosis     Depression     Hyperlipemia     Essential hypertension     Prediabetes     Gastroesophageal reflux disease without esophagitis     Vitamin D deficiency     Mild episode of recurrent major depressive disorder (HCC)     Type 2 diabetes mellitus without complication, without long-term current use of insulin (HCC)     Accidental poisoning by carbon monoxide      ----JF

## 2024-07-31 DIAGNOSIS — I10 ESSENTIAL HYPERTENSION: ICD-10-CM

## 2024-07-31 RX ORDER — LISINOPRIL 5 MG/1
5 TABLET ORAL DAILY
Qty: 30 TABLET | Refills: 2 | Status: SHIPPED | OUTPATIENT
Start: 2024-07-31

## 2024-07-31 NOTE — TELEPHONE ENCOUNTER
current use of insulin (HCC)     Accidental poisoning by carbon monoxide           Please address the medication refill and close the encounter.  If I can be of assistance, please route to the applicable pool.      Thank you.

## 2024-08-14 DIAGNOSIS — E11.9 TYPE 2 DIABETES MELLITUS WITHOUT COMPLICATION, WITHOUT LONG-TERM CURRENT USE OF INSULIN (HCC): ICD-10-CM

## 2024-08-14 NOTE — TELEPHONE ENCOUNTER
depressive disorder (HCC)     Type 2 diabetes mellitus without complication, without long-term current use of insulin (HCC)     Accidental poisoning by carbon monoxide

## 2024-09-17 ENCOUNTER — HOSPITAL ENCOUNTER (EMERGENCY)
Age: 54
Discharge: HOME | End: 2024-09-17
Attending: EMERGENCY MEDICINE
Payer: COMMERCIAL

## 2024-09-17 VITALS
SYSTOLIC BLOOD PRESSURE: 152 MMHG | DIASTOLIC BLOOD PRESSURE: 84 MMHG | RESPIRATION RATE: 18 BRPM | WEIGHT: 123 LBS | HEIGHT: 64 IN | OXYGEN SATURATION: 100 % | BODY MASS INDEX: 21 KG/M2 | HEART RATE: 100 BPM | TEMPERATURE: 97.3 F

## 2024-09-17 DIAGNOSIS — M62.830 LUMBAR PARASPINAL MUSCLE SPASM: Primary | ICD-10-CM

## 2024-09-17 LAB
BACTERIA URNS QL MICRO: ABNORMAL
BILIRUB UR QL STRIP: NEGATIVE
CASTS #/AREA URNS LPF: ABNORMAL /LPF (ref 0–8)
CLARITY UR: CLEAR
COLOR UR: YELLOW
EPI CELLS #/AREA URNS HPF: ABNORMAL /HPF (ref 0–5)
GLUCOSE UR STRIP-MCNC: NEGATIVE MG/DL
HGB UR QL STRIP.AUTO: NEGATIVE
KETONES UR STRIP-MCNC: NEGATIVE MG/DL
LEUKOCYTE ESTERASE UR QL STRIP: ABNORMAL
NITRITE UR QL STRIP: NEGATIVE
PH UR STRIP: 5 [PH] (ref 5–8)
PROT UR STRIP-MCNC: NEGATIVE MG/DL
RBC #/AREA URNS HPF: ABNORMAL /HPF (ref 0–4)
SP GR UR STRIP: 1.02 (ref 1–1.03)
UROBILINOGEN UR STRIP-ACNC: NORMAL EU/DL (ref 0–1)
WBC #/AREA URNS HPF: ABNORMAL /HPF (ref 0–5)

## 2024-09-17 PROCEDURE — 6370000000 HC RX 637 (ALT 250 FOR IP)

## 2024-09-17 PROCEDURE — 96372 THER/PROPH/DIAG INJ SC/IM: CPT

## 2024-09-17 PROCEDURE — 6360000002 HC RX W HCPCS

## 2024-09-17 PROCEDURE — 81001 URINALYSIS AUTO W/SCOPE: CPT

## 2024-09-17 PROCEDURE — 99284 EMERGENCY DEPT VISIT MOD MDM: CPT

## 2024-09-17 RX ORDER — ACETAMINOPHEN 500 MG
1000 TABLET ORAL 3 TIMES DAILY
Qty: 42 TABLET | Refills: 0 | Status: SHIPPED | OUTPATIENT
Start: 2024-09-17 | End: 2024-09-24

## 2024-09-17 RX ORDER — ORPHENADRINE CITRATE 30 MG/ML
60 INJECTION INTRAMUSCULAR; INTRAVENOUS ONCE
Status: COMPLETED | OUTPATIENT
Start: 2024-09-17 | End: 2024-09-17

## 2024-09-17 RX ORDER — LIDOCAINE 4 G/G
1 PATCH TOPICAL ONCE
Status: DISCONTINUED | OUTPATIENT
Start: 2024-09-17 | End: 2024-09-17 | Stop reason: HOSPADM

## 2024-09-17 RX ORDER — LIDOCAINE 4 G/G
1 PATCH TOPICAL DAILY
Qty: 30 PATCH | Refills: 0 | Status: SHIPPED | OUTPATIENT
Start: 2024-09-17 | End: 2024-10-17

## 2024-09-17 RX ORDER — METHOCARBAMOL 750 MG/1
750 TABLET, FILM COATED ORAL 3 TIMES DAILY
Qty: 21 TABLET | Refills: 0 | Status: SHIPPED | OUTPATIENT
Start: 2024-09-17 | End: 2024-09-24

## 2024-09-17 RX ORDER — ACETAMINOPHEN 325 MG/1
650 TABLET ORAL ONCE
Status: COMPLETED | OUTPATIENT
Start: 2024-09-17 | End: 2024-09-17

## 2024-09-17 RX ADMIN — ORPHENADRINE CITRATE 60 MG: 30 INJECTION, SOLUTION INTRAMUSCULAR; INTRAVENOUS at 15:44

## 2024-09-17 RX ADMIN — ACETAMINOPHEN 650 MG: 325 TABLET ORAL at 15:44

## 2024-09-17 ASSESSMENT — PAIN DESCRIPTION - DESCRIPTORS: DESCRIPTORS: SHARP

## 2024-09-17 ASSESSMENT — PAIN SCALES - GENERAL: PAINLEVEL_OUTOF10: 10

## 2024-09-17 ASSESSMENT — PAIN - FUNCTIONAL ASSESSMENT
PAIN_FUNCTIONAL_ASSESSMENT: 0-10
PAIN_FUNCTIONAL_ASSESSMENT: ACTIVITIES ARE NOT PREVENTED

## 2024-09-17 ASSESSMENT — PAIN DESCRIPTION - ORIENTATION: ORIENTATION: LOWER

## 2024-09-17 ASSESSMENT — PAIN DESCRIPTION - LOCATION: LOCATION: BACK

## 2024-09-17 ASSESSMENT — PAIN DESCRIPTION - PAIN TYPE: TYPE: ACUTE PAIN

## 2024-10-08 DIAGNOSIS — E78.00 PURE HYPERCHOLESTEROLEMIA: ICD-10-CM

## 2024-10-08 RX ORDER — ATORVASTATIN CALCIUM 80 MG/1
80 TABLET, FILM COATED ORAL DAILY
Qty: 30 TABLET | Refills: 2 | Status: SHIPPED | OUTPATIENT
Start: 2024-10-08

## 2024-10-08 NOTE — TELEPHONE ENCOUNTER
Patient last seen by Dr. Chowdhury. Writer left voicemail or patient to contact office to schedule an appointment. Writer sent [facility] message as well.        Please address the medication refill and close the encounter.  If I can be of assistance, please route to the applicable pool.      Thank you.    Last visit: 11-9-23  Last Med refill: 7-5-24  Does patient have enough medication for 72 hours: No:     Next Visit Date:  No future appointments.    Health Maintenance   Topic Date Due    Hepatitis B vaccine (1 of 3 - 19+ 3-dose series) Never done    Colorectal Cancer Screen  Never done    Pneumococcal 0-64 years Vaccine (2 of 2 - PCV) 11/14/2018    Shingles vaccine (1 of 2) Never done    Diabetic foot exam  06/01/2019    Breast cancer screen  01/11/2021    Diabetic retinal exam  02/18/2021    Diabetic Alb to Cr ratio (uACR) test  04/05/2023    Lipids  04/05/2023    GFR test (Diabetes, CKD 3-4, OR last GFR 15-59)  04/05/2023    A1C test (Diabetic or Prediabetic)  06/07/2024    Depression Monitoring  06/07/2024    Flu vaccine (1) 08/01/2024    COVID-19 Vaccine (1 - 2023-24 season) Never done    DTaP/Tdap/Td vaccine (2 - Td or Tdap) 11/14/2027    Hepatitis C screen  Completed    HIV screen  Completed    Hepatitis A vaccine  Aged Out    Hib vaccine  Aged Out    Polio vaccine  Aged Out    Meningococcal (ACWY) vaccine  Aged Out       Hemoglobin A1C (%)   Date Value   06/07/2023 6.3   04/05/2022 6.4   01/27/2020 5.4             ( goal A1C is < 7)   No components found for: \"LABMICR\"  No components found for: \"LDLCHOLESTEROL\", \"LDLCALC\"    (goal LDL is <100)   AST (U/L)   Date Value   04/05/2022 19     ALT (U/L)   Date Value   04/05/2022 16     BUN (mg/dL)   Date Value   04/05/2022 9     BP Readings from Last 3 Encounters:   09/17/24 (!) 152/84   06/23/24 (!) 153/95   11/09/23 110/80          (goal 120/80)    All Future Testing planned in [facility]  Lab Frequency Next Occurrence               Patient Active Problem List:

## 2024-11-04 DIAGNOSIS — K21.9 GASTROESOPHAGEAL REFLUX DISEASE WITHOUT ESOPHAGITIS: ICD-10-CM

## 2024-11-21 RX ORDER — LANCETS 30 GAUGE
EACH MISCELLANEOUS
Qty: 100 EACH | Refills: 4 | Status: SHIPPED | OUTPATIENT
Start: 2024-11-21

## 2024-11-21 NOTE — TELEPHONE ENCOUNTER
Last visit: 11/09/2023  Last Med refill: 06/24/2024  Does patient have enough medication for 72 hours: No: LVM for a return call to schedule for NTP and med refills     Next Visit Date:  No future appointments.    Health Maintenance   Topic Date Due    Hepatitis B vaccine (1 of 3 - 19+ 3-dose series) Never done    Colorectal Cancer Screen  Never done    Pneumococcal 0-64 years Vaccine (2 of 2 - PCV) 11/14/2018    Shingles vaccine (1 of 2) Never done    Diabetic foot exam  06/01/2019    Breast cancer screen  01/11/2021    Diabetic retinal exam  02/18/2021    Diabetic Alb to Cr ratio (uACR) test  04/05/2023    Lipids  04/05/2023    GFR test (Diabetes, CKD 3-4, OR last GFR 15-59)  04/05/2023    A1C test (Diabetic or Prediabetic)  06/07/2024    Depression Monitoring  06/07/2024    Flu vaccine (1) 08/01/2024    COVID-19 Vaccine (1 - 2023-24 season) Never done    DTaP/Tdap/Td vaccine (2 - Td or Tdap) 11/14/2027    Hepatitis C screen  Completed    HIV screen  Completed    Hepatitis A vaccine  Aged Out    Hib vaccine  Aged Out    Polio vaccine  Aged Out    Meningococcal (ACWY) vaccine  Aged Out       Hemoglobin A1C (%)   Date Value   06/07/2023 6.3   04/05/2022 6.4   01/27/2020 5.4             ( goal A1C is < 7)   No components found for: \"LABMICR\"  No components found for: \"LDLCHOLESTEROL\", \"LDLCALC\"    (goal LDL is <100)   AST (U/L)   Date Value   04/05/2022 19     ALT (U/L)   Date Value   04/05/2022 16     BUN (mg/dL)   Date Value   04/05/2022 9     BP Readings from Last 3 Encounters:   09/17/24 (!) 152/84   06/23/24 (!) 153/95   11/09/23 110/80          (goal 120/80)    All Future Testing planned in [facility]  Lab Frequency Next Occurrence               Patient Active Problem List:     Bipolar disorder (HCC)     Scoliosis     Depression     Hyperlipemia     Essential hypertension     Prediabetes     Gastroesophageal reflux disease without esophagitis     Vitamin D deficiency     Mild episode of recurrent major depressive

## 2024-12-06 DIAGNOSIS — E11.9 TYPE 2 DIABETES MELLITUS WITHOUT COMPLICATION, WITHOUT LONG-TERM CURRENT USE OF INSULIN (HCC): ICD-10-CM

## 2024-12-31 DIAGNOSIS — F17.200 SMOKER: ICD-10-CM

## 2024-12-31 DIAGNOSIS — R05.3 CHRONIC COUGH: ICD-10-CM

## 2024-12-31 DIAGNOSIS — E78.00 PURE HYPERCHOLESTEROLEMIA: ICD-10-CM

## 2025-01-02 RX ORDER — ATORVASTATIN CALCIUM 80 MG/1
80 TABLET, FILM COATED ORAL DAILY
Qty: 30 TABLET | Refills: 2 | Status: SHIPPED | OUTPATIENT
Start: 2025-01-02

## 2025-01-02 RX ORDER — TIOTROPIUM BROMIDE 18 UG/1
18 CAPSULE ORAL; RESPIRATORY (INHALATION) DAILY
Qty: 30 CAPSULE | Refills: 0 | Status: SHIPPED | OUTPATIENT
Start: 2025-01-02

## 2025-01-02 NOTE — TELEPHONE ENCOUNTER
Last visit: 111/29/2023  Last Med refill: 07/05/2024  Does patient have enough medication for 72 hours: No: LVM FOR A RETURN CALL TO SCHEDULE FOR NTP AND MED REFILLS     Next Visit Date:  No future appointments.    Health Maintenance   Topic Date Due    Hepatitis B vaccine (1 of 3 - 19+ 3-dose series) Never done    Colorectal Cancer Screen  Never done    Pneumococcal 0-64 years Vaccine (2 of 2 - PCV) 11/14/2018    Shingles vaccine (1 of 2) Never done    Diabetic foot exam  06/01/2019    Breast cancer screen  01/11/2021    Diabetic retinal exam  02/18/2021    Diabetic Alb to Cr ratio (uACR) test  04/05/2023    Lipids  04/05/2023    GFR test (Diabetes, CKD 3-4, OR last GFR 15-59)  04/05/2023    A1C test (Diabetic or Prediabetic)  06/07/2024    Depression Monitoring  06/07/2024    Flu vaccine (1) 08/01/2024    COVID-19 Vaccine (1 - 2023-24 season) Never done    DTaP/Tdap/Td vaccine (2 - Td or Tdap) 11/14/2027    Hepatitis C screen  Completed    HIV screen  Completed    Hepatitis A vaccine  Aged Out    Hib vaccine  Aged Out    Polio vaccine  Aged Out    Meningococcal (ACWY) vaccine  Aged Out       Hemoglobin A1C (%)   Date Value   06/07/2023 6.3   04/05/2022 6.4   01/27/2020 5.4             ( goal A1C is < 7)   No components found for: \"LABMICR\"  No components found for: \"LDLCHOLESTEROL\", \"LDLCALC\"    (goal LDL is <100)   AST (U/L)   Date Value   04/05/2022 19     ALT (U/L)   Date Value   04/05/2022 16     BUN (mg/dL)   Date Value   04/05/2022 9     BP Readings from Last 3 Encounters:   09/17/24 (!) 152/84   06/23/24 (!) 153/95   11/09/23 110/80          (goal 120/80)    All Future Testing planned in [facility]  Lab Frequency Next Occurrence               Patient Active Problem List:     Bipolar disorder (HCC)     Scoliosis     Depression     Hyperlipemia     Essential hypertension     Prediabetes     Gastroesophageal reflux disease without esophagitis     Vitamin D deficiency     Mild episode of recurrent major

## 2025-01-02 NOTE — TELEPHONE ENCOUNTER
Writer received medication refill for atorvastatin. Writer attempted to reach patient to schedule appointment. Phone number [phone] is not patient phone number. Writer removed. Phone number [phone] will ring and then static then voicemail. Writer called twice. Left voicemail. Writer sent [employer] message as well.       Patient last seen by Dr. Fisher would need a new to provider appointment.

## 2025-01-06 DIAGNOSIS — R73.03 PREDIABETES: ICD-10-CM

## 2025-01-06 RX ORDER — UBIQUINOL 100 MG
1 CAPSULE ORAL 2 TIMES DAILY
Qty: 100 EACH | Refills: 5 | Status: SHIPPED | OUTPATIENT
Start: 2025-01-06

## 2025-01-06 NOTE — TELEPHONE ENCOUNTER
Last visit: 11/09/2023  Last Med refill: 05/09/2024  Does patient have enough medication for 72 hours: No:     Next Visit Date:  No future appointments.    Health Maintenance   Topic Date Due    Hepatitis B vaccine (1 of 3 - 19+ 3-dose series) Never done    Colorectal Cancer Screen  Never done    Pneumococcal 0-64 years Vaccine (2 of 2 - PCV) 11/14/2018    Shingles vaccine (1 of 2) Never done    Diabetic foot exam  06/01/2019    Breast cancer screen  01/11/2021    Diabetic retinal exam  02/18/2021    Diabetic Alb to Cr ratio (uACR) test  04/05/2023    Lipids  04/05/2023    GFR test (Diabetes, CKD 3-4, OR last GFR 15-59)  04/05/2023    A1C test (Diabetic or Prediabetic)  06/07/2024    Depression Monitoring  06/07/2024    Flu vaccine (1) 08/01/2024    COVID-19 Vaccine (1 - 2023-24 season) Never done    DTaP/Tdap/Td vaccine (2 - Td or Tdap) 11/14/2027    Hepatitis C screen  Completed    HIV screen  Completed    Hepatitis A vaccine  Aged Out    Hib vaccine  Aged Out    Polio vaccine  Aged Out    Meningococcal (ACWY) vaccine  Aged Out       Hemoglobin A1C (%)   Date Value   06/07/2023 6.3   04/05/2022 6.4   01/27/2020 5.4             ( goal A1C is < 7)   No components found for: \"LABMICR\"  No components found for: \"LDLCHOLESTEROL\", \"LDLCALC\"    (goal LDL is <100)   AST (U/L)   Date Value   04/05/2022 19     ALT (U/L)   Date Value   04/05/2022 16     BUN (mg/dL)   Date Value   04/05/2022 9     BP Readings from Last 3 Encounters:   09/17/24 (!) 152/84   06/23/24 (!) 153/95   11/09/23 110/80          (goal 120/80)    All Future Testing planned in [facility]  Lab Frequency Next Occurrence               Patient Active Problem List:     Bipolar disorder (HCC)     Scoliosis     Depression     Hyperlipemia     Essential hypertension     Prediabetes     Gastroesophageal reflux disease without esophagitis     Vitamin D deficiency     Mild episode of recurrent major depressive disorder (HCC)     Type 2 diabetes mellitus without

## 2025-01-21 DIAGNOSIS — I10 ESSENTIAL HYPERTENSION: ICD-10-CM

## 2025-01-21 RX ORDER — LISINOPRIL 5 MG/1
5 TABLET ORAL DAILY
Qty: 30 TABLET | Refills: 5 | Status: SHIPPED | OUTPATIENT
Start: 2025-01-21

## 2025-01-21 NOTE — TELEPHONE ENCOUNTER
Last visit: 11/09/2023  Last Med refill: 07/31/2024  Does patient have enough medication for 72 hours: No: Tried to contact patient no answer / my chart message sent     Next Visit Date:  No future appointments.    Health Maintenance   Topic Date Due    Hepatitis B vaccine (1 of 3 - 19+ 3-dose series) Never done    Colorectal Cancer Screen  Never done    Pneumococcal 0-64 years Vaccine (2 of 2 - PCV) 11/14/2018    Shingles vaccine (1 of 2) Never done    Diabetic foot exam  06/01/2019    Breast cancer screen  01/11/2021    Diabetic retinal exam  02/18/2021    Diabetic Alb to Cr ratio (uACR) test  04/05/2023    Lipids  04/05/2023    GFR test (Diabetes, CKD 3-4, OR last GFR 15-59)  04/05/2023    A1C test (Diabetic or Prediabetic)  06/07/2024    Depression Monitoring  06/07/2024    Flu vaccine (1) 08/01/2024    COVID-19 Vaccine (1 - 2023-24 season) Never done    DTaP/Tdap/Td vaccine (2 - Td or Tdap) 11/14/2027    Hepatitis C screen  Completed    HIV screen  Completed    Hepatitis A vaccine  Aged Out    Hib vaccine  Aged Out    Polio vaccine  Aged Out    Meningococcal (ACWY) vaccine  Aged Out       Hemoglobin A1C (%)   Date Value   06/07/2023 6.3   04/05/2022 6.4   01/27/2020 5.4             ( goal A1C is < 7)   No components found for: \"LABMICR\"  No components found for: \"LDLCHOLESTEROL\", \"LDLCALC\"    (goal LDL is <100)   AST (U/L)   Date Value   04/05/2022 19     ALT (U/L)   Date Value   04/05/2022 16     BUN (mg/dL)   Date Value   04/05/2022 9     BP Readings from Last 3 Encounters:   09/17/24 (!) 152/84   06/23/24 (!) 153/95   11/09/23 110/80          (goal 120/80)    All Future Testing planned in [facility]  Lab Frequency Next Occurrence               Patient Active Problem List:     Bipolar disorder (HCC)     Scoliosis     Depression     Hyperlipemia     Essential hypertension     Prediabetes     Gastroesophageal reflux disease without esophagitis     Vitamin D deficiency     Mild episode of recurrent major

## 2025-03-15 ENCOUNTER — HOSPITAL ENCOUNTER (EMERGENCY)
Age: 55
Discharge: HOME | End: 2025-03-15
Attending: EMERGENCY MEDICINE
Payer: COMMERCIAL

## 2025-03-15 VITALS
RESPIRATION RATE: 17 BRPM | BODY MASS INDEX: 21.11 KG/M2 | TEMPERATURE: 98.2 F | OXYGEN SATURATION: 98 % | SYSTOLIC BLOOD PRESSURE: 129 MMHG | DIASTOLIC BLOOD PRESSURE: 85 MMHG | HEART RATE: 101 BPM | WEIGHT: 123 LBS

## 2025-03-15 DIAGNOSIS — H00.021 HORDEOLUM INTERNUM OF RIGHT UPPER EYELID: Primary | ICD-10-CM

## 2025-03-15 PROCEDURE — 6370000000 HC RX 637 (ALT 250 FOR IP)

## 2025-03-15 PROCEDURE — 99283 EMERGENCY DEPT VISIT LOW MDM: CPT

## 2025-03-15 RX ORDER — TETRACAINE HYDROCHLORIDE 5 MG/ML
1 SOLUTION OPHTHALMIC ONCE
Status: COMPLETED | OUTPATIENT
Start: 2025-03-15 | End: 2025-03-15

## 2025-03-15 RX ADMIN — TETRACAINE HYDROCHLORIDE 1 DROP: 5 SOLUTION OPHTHALMIC at 15:20

## 2025-03-15 RX ADMIN — FLUORESCEIN SODIUM 1 MG: 1 STRIP OPHTHALMIC at 15:19

## 2025-03-15 ASSESSMENT — ENCOUNTER SYMPTOMS
EYE REDNESS: 1
NAUSEA: 0
EYE PAIN: 1
BACK PAIN: 0
COUGH: 0
SHORTNESS OF BREATH: 0
VOMITING: 0
PHOTOPHOBIA: 0
EYE ITCHING: 0
DIARRHEA: 0
EYE DISCHARGE: 0
ABDOMINAL PAIN: 0

## 2025-03-15 NOTE — DISCHARGE INSTRUCTIONS
Hordeola are usually self-limited, spontaneously improve in 1 - 2 weeks.  Apply a warm moist towel to the eyelid several times a day will help.  Eyelid hygiene (lid scrubs), warm compresses and massages of the lesions for 10 minutes 4 times per day, and topical antibiotic ointment can be applied.    Take your medication as indicated and prescribed.  Ketotifen 0.5 solution (Zaditor) or naphazoline (Naphcon-A) along with diphenhydramine (Benadryl) can be used to help with any itching.    For pain use ibuprofen (Motrin / Advil) or acetaminophen (Tylenol), unless prescribed medications that have acetaminophen in it.  You can take over the counter acetaminophen tablets (1 - 2 tablets of the 500-mg strength every 6 hours) or ibuprofen tablets (2 tablets every 4 hours).      PLEASE RETURN TO THE EMERGENCY DEPARTMENT IMMEDIATELY for worsening symptoms, swelling or drainage from the eye, inability to see, or if you develop any concerning symptoms such as: high fever not relieved by acetaminophen (Tylenol) and/or ibuprofen (Motrin / Advil), chills, feeling of your heart fluttering or racing, persistent nausea and/or vomiting, loss of consciousness, changes in mental status, persistent headache, blurry vision, unable to follow up with your physician, or any other care or concern.

## 2025-03-15 NOTE — ED TRIAGE NOTES
Pt to ED for R eye irritation, redness and watering. Pt states she has had this for about a week. Pt states it is not blurry. Pt has been using eyedrops for it as well.

## 2025-03-15 NOTE — ED PROVIDER NOTES
[facility] EMERGENCY DEPARTMENT  Emergency Department Encounter  Emergency Medicine Resident     Pt Name:Sonja Gomez  MRN: [id number]  Birthdate 1970  Date of evaluation: 3/15/25  PCP:  Marcy Chowdhury MD  Note Started: 2:35 PM EDT      CHIEF COMPLAINT       Chief Complaint   Patient presents with    Eye Problem     R eye       HISTORY OF PRESENT ILLNESS  (Location/Symptom, Timing/Onset, Context/Setting, Quality, Duration, Modifying Factors, Severity.)      Sonja Gomez is a 54 y.o. female who presents with right eye pain for past 2 weeks.  Patient states that she has been using leftover erythromycin ointment from previous incident with the opposite eye as well as artificial tears but these have been ineffective.  Patient states she has been having redness and tearing for the past 2 weeks as well.  She denies changes in vision or pain on eye movement.    PAST MEDICAL / SURGICAL / SOCIAL / FAMILY HISTORY      has a past medical history of Anxiety, Bipolar disorder (HCC), Brain aneurysm, Depression, Epilepsy (HCC), Essential hypertension, Gastroesophageal reflux disease without esophagitis, Headache, Hyperlipidemia, Liver disease, Manic depression (HCC), Pancreatitis, Scoliosis, and Seasonal allergies.       has a past surgical history that includes hernia repair; Tonsillectomy; other surgical history (2015); Hysterectomy; hc picc power 5f triple (2015); and brain surgery.      Social History     Socioeconomic History    Marital status: Single     Spouse name: Not on file    Number of children: Not on file    Years of education: Not on file    Highest education level: Not on file   Occupational History    Not on file   Tobacco Use    Smoking status: Every Day     Current packs/day: 0.00     Average packs/day: 0.3 packs/day for 40.0 years (10.0 ttl pk-yrs)     Types: Cigarettes     Start date: 1975     Last attempt to quit: 2015     Years since quittin.4    Smokeless 
tears  Will get slit lamp exam, IOPs  Likely stye    (Please note that portions of this note were completed with a voice recognition program.  Efforts were made to edit the dictations but occasionally words are mis-transcribed.)      Price Soler MD,, MD  Attending Emergency Physician          Price Soler MD  03/16/25 [id number]

## 2025-04-24 ENCOUNTER — APPOINTMENT (OUTPATIENT)
Dept: GENERAL RADIOLOGY | Age: 55
End: 2025-04-24
Payer: COMMERCIAL

## 2025-04-24 ENCOUNTER — HOSPITAL ENCOUNTER (EMERGENCY)
Age: 55
Discharge: HOME | End: 2025-04-24
Attending: EMERGENCY MEDICINE
Payer: COMMERCIAL

## 2025-04-24 VITALS
SYSTOLIC BLOOD PRESSURE: 118 MMHG | DIASTOLIC BLOOD PRESSURE: 78 MMHG | TEMPERATURE: 98 F | HEART RATE: 89 BPM | OXYGEN SATURATION: 99 % | RESPIRATION RATE: 18 BRPM

## 2025-04-24 DIAGNOSIS — S63.502A SPRAIN OF LEFT WRIST, INITIAL ENCOUNTER: Primary | ICD-10-CM

## 2025-04-24 PROCEDURE — 6370000000 HC RX 637 (ALT 250 FOR IP)

## 2025-04-24 PROCEDURE — 99283 EMERGENCY DEPT VISIT LOW MDM: CPT

## 2025-04-24 PROCEDURE — 73110 X-RAY EXAM OF WRIST: CPT

## 2025-04-24 RX ORDER — ACETAMINOPHEN 500 MG
1000 TABLET ORAL ONCE
Status: COMPLETED | OUTPATIENT
Start: 2025-04-24 | End: 2025-04-24

## 2025-04-24 RX ADMIN — ACETAMINOPHEN 1000 MG: 500 TABLET ORAL at 11:38

## 2025-04-24 ASSESSMENT — PAIN SCALES - GENERAL: PAINLEVEL_OUTOF10: 9

## 2025-04-24 ASSESSMENT — LIFESTYLE VARIABLES
HOW MANY STANDARD DRINKS CONTAINING ALCOHOL DO YOU HAVE ON A TYPICAL DAY: PATIENT DECLINED
HOW OFTEN DO YOU HAVE A DRINK CONTAINING ALCOHOL: PATIENT DECLINED

## 2025-04-24 NOTE — ED PROVIDER NOTES
[facility] EMERGENCY DEPARTMENT  Emergency Department Encounter  Emergency Medicine Resident     Pt Name:Sonja Gomez  MRN: [id number]  Birthdate 1970  Date of evaluation: 4/24/25  PCP:  Marcy Chowdhury MD  Note Started: 11:16 AM EDT      CHIEF COMPLAINT       Chief Complaint   Patient presents with    Wrist Pain       HISTORY OF PRESENT ILLNESS  (Location/Symptom, Timing/Onset, Context/Setting, Quality, Duration, Modifying Factors, Severity.)      Sonja Gomez is a 55 y.o. female who is right-hand dominant, presents with pain in her left wrist after lifting heavy boxes yesterday.  Denies falling or any direct injury to the wrist.  Patient complaining of 10/10 pain, has not yet tried any medications or interventions for pain relief. Denies any numbness, tingling or loss of sensation. No other injuries.  Of note, patient states that she has been dealing with intermittent wrist pain for the past 6 weeks, but it was worse yesterday after lifting boxes.    PAST MEDICAL / SURGICAL / SOCIAL / FAMILY HISTORY      has a past medical history of Anxiety, Bipolar disorder (HCC), Brain aneurysm, Depression, Epilepsy (HCC), Essential hypertension, Gastroesophageal reflux disease without esophagitis, Headache, Hyperlipidemia, Liver disease, Manic depression (HCC), Pancreatitis, Scoliosis, and Seasonal allergies.     has a past surgical history that includes hernia repair; Tonsillectomy; other surgical history (09/22/2015); Hysterectomy; hc picc power 5f triple (9/23/2015); and brain surgery.    Social History     Socioeconomic History    Marital status: Single     Spouse name: Not on file    Number of children: Not on file    Years of education: Not on file    Highest education level: Not on file   Occupational History    Not on file   Tobacco Use    Smoking status: Every Day     Current packs/day: 0.00     Average packs/day: 0.3 packs/day for 40.0 years (10.0 ttl pk-yrs)     Types: Cigarettes     Start

## 2025-04-24 NOTE — ED PROVIDER NOTES
[facility] EMERGENCY DEPARTMENT     Emergency Department     Faculty Attestation    I performed a history and physical examination of the patient and discussed management with the resident. I reviewed the resident’s note and agree with the documented findings and plan of care. Any areas of disagreement are noted on the chart. I was personally present for the key portions of any procedures. I have documented in the chart those procedures where I was not present during the key portions. I have reviewed the emergency nurses triage note. I agree with the chief complaint, past medical history, past surgical history, allergies, medications, social and family history as documented unless otherwise noted below. For Physician Assistant/ Nurse Practitioner cases/documentation I have personally evaluated this patient and have completed at least one if not all key elements of the E/M (history, physical exam, and MDM). Additional findings are as noted.    Note Started: 11:56 AM EDT    Patient here with left wrist pain she is right-handed.  States has been an intermittent issue for 6 weeks but worse since yesterday was moving heavy boxes.  Woke up this morning with increased pain.  No impact or fall.  On exam nonfocal tenderness dorsal wrist strong pulses intact radial and ulnar nerve for motor and sensation.  No proximal tenderness will image plan discharge      Critical Care     none    Santy Tony MD, FACEP  Attending Emergency  Physician           Santy Tony MD  04/24/25 [id number]

## 2025-04-24 NOTE — ED NOTES
Pt arrived via triage for wrist pain  Started yesterday after lifting boxes while moving  Denies taking ibuprofen and tylenol  RR even and unlabored  Call light in reach

## 2025-04-24 NOTE — DISCHARGE INSTRUCTIONS
You were seen in the emergency department today for complaints of wrist pain.  Your x-ray came back negative for any broken bones.  Your symptoms are consistent with a wrist sprain.  Will give you a brace for comfort.  You can take Tylenol at home, and continue to ice it.  I will give you a set of exercises to perform to help strengthen your wrist and heal the sprain more quickly.

## 2025-07-16 DIAGNOSIS — I10 ESSENTIAL HYPERTENSION: ICD-10-CM

## 2025-07-17 RX ORDER — LISINOPRIL 5 MG/1
5 TABLET ORAL DAILY
Qty: 30 TABLET | Refills: 5 | Status: SHIPPED | OUTPATIENT
Start: 2025-07-17

## 2025-07-17 NOTE — TELEPHONE ENCOUNTER
Last visit: 11/09/2023  Last Med refill: 01/21/2025  Does patient have enough medication for 72 hours: No: LVM for a return call to schedule for NTP and med refills.     Next Visit Date:  No future appointments.    Health Maintenance   Topic Date Due    Depression Monitoring  Never done    Hepatitis B vaccine (1 of 3 - 19+ 3-dose series) Never done    Colorectal Cancer Screen  Never done    Shingles vaccine (1 of 2) Never done    Diabetic foot exam  06/01/2019    Breast cancer screen  01/11/2021    Diabetic retinal exam  02/18/2021    Pneumococcal 50+ years Vaccine (2 of 2 - PCV) 11/06/2021    Diabetic Alb to Cr ratio (uACR) test  04/05/2023    Lipids  04/05/2023    GFR test (Diabetes, CKD 3-4, OR last GFR 15-59)  04/05/2023    A1C test (Diabetic or Prediabetic)  06/07/2024    COVID-19 Vaccine (3 - 2024-25 season) 09/01/2024    Flu vaccine (1) 08/01/2025    DTaP/Tdap/Td vaccine (2 - Td or Tdap) 11/14/2027    Hepatitis C screen  Completed    HIV screen  Completed    Hepatitis A vaccine  Aged Out    Hib vaccine  Aged Out    Polio vaccine  Aged Out    Meningococcal (ACWY) vaccine  Aged Out    Meningococcal B vaccine  Aged Out    Pneumococcal 0-49 years Vaccine  Discontinued       Hemoglobin A1C (%)   Date Value   06/07/2023 6.3   04/05/2022 6.4   01/27/2020 5.4             ( goal A1C is < 7)   No components found for: \"LABMICR\"  No components found for: \"LDLCHOLESTEROL\", \"LDLCALC\"    (goal LDL is <100)   AST (U/L)   Date Value   04/05/2022 19     ALT (U/L)   Date Value   04/05/2022 16     BUN (mg/dL)   Date Value   04/05/2022 9     BP Readings from Last 3 Encounters:   04/24/25 118/78   03/15/25 129/85   09/17/24 (!) 152/84          (goal 120/80)    All Future Testing planned in [facility]  Lab Frequency Next Occurrence               Patient Active Problem List:     Bipolar disorder (HCC)     Scoliosis     Depression     Hyperlipemia     Essential hypertension     Prediabetes     Gastroesophageal reflux disease without

## 2025-08-13 ENCOUNTER — TELEPHONE (OUTPATIENT)
Age: 55
End: 2025-08-13

## 2025-08-20 DIAGNOSIS — E78.00 PURE HYPERCHOLESTEROLEMIA: ICD-10-CM

## 2025-08-21 RX ORDER — ATORVASTATIN CALCIUM 80 MG/1
80 TABLET, FILM COATED ORAL DAILY
Qty: 30 TABLET | Refills: 2 | Status: SHIPPED | OUTPATIENT
Start: 2025-08-21